# Patient Record
Sex: FEMALE | Race: WHITE | NOT HISPANIC OR LATINO | Employment: OTHER | URBAN - METROPOLITAN AREA
[De-identification: names, ages, dates, MRNs, and addresses within clinical notes are randomized per-mention and may not be internally consistent; named-entity substitution may affect disease eponyms.]

---

## 2018-05-07 ENCOUNTER — LAB VISIT (OUTPATIENT)
Dept: LAB | Facility: HOSPITAL | Age: 68
End: 2018-05-07
Attending: UROLOGY
Payer: MEDICARE

## 2018-05-07 ENCOUNTER — OFFICE VISIT (OUTPATIENT)
Dept: UROLOGY | Facility: CLINIC | Age: 68
End: 2018-05-07
Payer: MEDICARE

## 2018-05-07 VITALS
BODY MASS INDEX: 23.71 KG/M2 | HEIGHT: 64 IN | TEMPERATURE: 98 F | SYSTOLIC BLOOD PRESSURE: 165 MMHG | HEART RATE: 64 BPM | WEIGHT: 138.88 LBS | DIASTOLIC BLOOD PRESSURE: 74 MMHG

## 2018-05-07 DIAGNOSIS — N20.0 NEPHROLITHIASIS: ICD-10-CM

## 2018-05-07 DIAGNOSIS — N13.30 HYDRONEPHROSIS, UNSPECIFIED HYDRONEPHROSIS TYPE: ICD-10-CM

## 2018-05-07 DIAGNOSIS — N13.30 HYDRONEPHROSIS, UNSPECIFIED HYDRONEPHROSIS TYPE: Primary | ICD-10-CM

## 2018-05-07 LAB
ALBUMIN SERPL BCP-MCNC: 3.7 G/DL
ALP SERPL-CCNC: 97 U/L
ALT SERPL W/O P-5'-P-CCNC: 16 U/L
ANION GAP SERPL CALC-SCNC: 11 MMOL/L
AST SERPL-CCNC: 17 U/L
BILIRUB SERPL-MCNC: 1.1 MG/DL
BILIRUB SERPL-MCNC: NORMAL MG/DL
BLOOD URINE, POC: NORMAL
BUN SERPL-MCNC: 13 MG/DL
CALCIUM SERPL-MCNC: 9.3 MG/DL
CHLORIDE SERPL-SCNC: 94 MMOL/L
CO2 SERPL-SCNC: 26 MMOL/L
COLOR, POC UA: NORMAL
CREAT SERPL-MCNC: 0.8 MG/DL
EST. GFR  (AFRICAN AMERICAN): >60 ML/MIN/1.73 M^2
EST. GFR  (NON AFRICAN AMERICAN): >60 ML/MIN/1.73 M^2
GLUCOSE SERPL-MCNC: 113 MG/DL
GLUCOSE UR QL STRIP: NORMAL
KETONES UR QL STRIP: NORMAL
LEUKOCYTE ESTERASE URINE, POC: NORMAL
NITRITE, POC UA: NORMAL
PH, POC UA: 5
POTASSIUM SERPL-SCNC: 3.7 MMOL/L
PROT SERPL-MCNC: 8.1 G/DL
PROTEIN, POC: NORMAL
SODIUM SERPL-SCNC: 131 MMOL/L
SPECIFIC GRAVITY, POC UA: 1015
UROBILINOGEN, POC UA: NORMAL

## 2018-05-07 PROCEDURE — 81002 URINALYSIS NONAUTO W/O SCOPE: CPT | Mod: S$GLB,,, | Performed by: UROLOGY

## 2018-05-07 PROCEDURE — 99999 PR PBB SHADOW E&M-EST. PATIENT-LVL III: CPT | Mod: PBBFAC,,, | Performed by: UROLOGY

## 2018-05-07 PROCEDURE — 99213 OFFICE O/P EST LOW 20 MIN: CPT | Mod: 25,S$GLB,, | Performed by: UROLOGY

## 2018-05-07 PROCEDURE — 36415 COLL VENOUS BLD VENIPUNCTURE: CPT

## 2018-05-07 PROCEDURE — 3078F DIAST BP <80 MM HG: CPT | Mod: CPTII,S$GLB,, | Performed by: UROLOGY

## 2018-05-07 PROCEDURE — 80053 COMPREHEN METABOLIC PANEL: CPT

## 2018-05-07 PROCEDURE — 3077F SYST BP >= 140 MM HG: CPT | Mod: CPTII,S$GLB,, | Performed by: UROLOGY

## 2018-05-07 RX ORDER — ALBUTEROL SULFATE 90 UG/1
2 AEROSOL, METERED RESPIRATORY (INHALATION) EVERY 6 HOURS PRN
COMMUNITY
Start: 2018-03-16

## 2018-05-07 NOTE — PROGRESS NOTES
[facility] Progress Note - [place]  Urology Group: [facility]/Jerald/Debo/Zuleyma  PCP: Dr. Abbey Lafleur [facility]  Cardiologist:     Chief Complaint: left renal stone, left hydro      Subjective:        HPI: Pebbles Boyle is a 67 y.o. female presents with     Last seen 11/3/16    Pt has not been seen since nov 2016. Was supposed to f/u but did not due to other issues. She decided to make f/u bc she has developed edema and was told by her cardiologist this was not due to a cardiac issue. She also has been having intermittent b flank pain. She has had hypoglycemia and hypotension from her new meds.       Nephrolithiasis and possible left UPJo  -ctap w wo 6/8/16 showed the right renal mass (see below) and left renal stone with mild left hydro  -mag3 renal scan 6/29/16 showed 49%fxn right, 51% fxn left, t1/2 8 min on right and 13 minutes on left.   -she had a LMP 8mm stone and underwent ESWL on 7/25/16  -a f/u KUB on 7/27/16 still showed stone  -24 hour urine 10/5/16 only showed a borderline pth  -stone analysis showed 95% ca oxalate stone  -denies increased flank pain with increased fluid intake    Right renal oncocytoma s/p resection in 2016  -in 2016 was found to have a 3cm rup endophytic renal mass abutting collecting system. Ct was done for abdominal us which had been done for elevated lfts. She saw  initially and came to me for 2nd opinion.  -I performed an open right partial nephrectomy on 8/10/16 and pathology returned oncocytoma. She has not had any hematuria or flank pain. Her cr was 0.8 on f/u.         REVIEW OF SYSTEMS:  General ROS: no fevers, no chills  Psychological ROS: no depression  Endocrine ROS: no heat or cold  Respiratory ROS: no SOB  Cardiovascular ROS: +edema  Gastrointestinal ROS: no abdominal pain, no constipation, no diarrhea, no BRBPR  Musculoskeletal ROS: no muscle pain  Neurological ROS: no headaches  Dermatological ROS: no  rashes  HEENT: no glasses, no sinus   ROS: per HPI    The past medical, surgical, social and family hx were reviewed. There have been any changes.       Urologic meds: none  Anticoagulation: No    Objective:     Vitals:    05/07/18 1130   BP: (!) 165/74   Pulse: 64   Temp: 98.3 °F (36.8 °C)       General:WDWN in NAD  Eyes: PERRLA, normal conjunctiva  Respiratory: no increased work on breathing. No wheezing.   Cardiovascular: No obvious extremity edema. Warm and well perfused.   GI: no palpation of masses. No tenderness. No hepatosplenomegaly to palpation.  Musculoskeletal: normal range of motion of bilateral upper extremities. Normal muscle strength and tone.  Skin: no obvious rashes or lesions. No tightening of skin noted.  Neurologic: CN grossly normal. Normal sensation.   Psychiatric: awake, alert and oriented x 3. Mood and affect normal. Cooperative.    Pelvic exam  deferred    Urinalysis: negative  Labs:  Last labs in 2016    Path:   Stone analysis 8/29/16: mostly calcium oxalate    RIGHT KIDNEY, PARTIAL NEPHRECTOMY 8/10/16  -Renal oncocytoma, see note.  -Margin of resection is free of tumor.    Rads:   kub 7/27/16 left renal stone still visible  kub 7/19/16 6mm stone visible on kub   mag3 6/29/16  t 1/2 - right 8 minutes, left 13 minutes  fxn - 49% right, 51% fxn left     ctap w wo contrast 6/8/16 at [facility]   3cm RUP mostly endohytic renal mass abutting the collecting system  1 ra with late splitting of Ra  1 rv  No rp lad     LMP 8mm stone, nonobstructing  L mild hydro with possible left upj    Assessment:       1. Hydronephrosis, unspecified hydronephrosis type    2. Nephrolithiasis        Plan:     Left hydronephrosis and kidney stone  -she has a h/o left mid pole stone s/p ESWL that did not respond in 2016. Was noted to have left hydro and we ordered a mag3 which showed normal drainage but slightly slower on left compared to right. She has been having some edema not explained by cardiologist and also some  intermittent bilateral flank pain L>R. We will start with a renal utlrasound to evaluate for hydronephrosis on the left an x ray to evaluate stone size  -If renal ultrasound shows hydro then she will need a mag3 scan to make sure her kidney is draining appropriately. This can cause pain if kidney doesn't drain well.  -if stone is larger, HU 1400 and failed ESWL then would recommend treatment (would depend on what mag3 renal scan says). Not a good candidate for repeat ESWL bc it did not work.     Oncocytoma, right s/p partial nephrectomy  -no further follow-up needed for this but can see if there's any new masses on the right kidney.     Check LFTs     F/u 4 weeks and may need mag3 renal scan prior to f/u based on ultrasound.

## 2018-05-07 NOTE — PATIENT INSTRUCTIONS
Left hydronephrosis and kidney stone  -she has a h/o left mid pole stone s/p ESWL that did not respond in 2016. Was noted to have left hydro and we ordered a mag3 which showed normal drainage but slightly slower on left compared to right. She has been having some edema not explained by cardiologist and also some intermittent bilateral flank pain L>R. We will start with a renal utlrasound to evaluate for hydronephrosis on the left an x ray to evaluate stone size  -If renal ultrasound shows hydro then she will need a mag3 scan to make sure her kidney is draining appropriately. This can cause pain if kidney doesn't drain well.  -if stone is larger, HU 1400 and failed ESWL then would recommend treatment (would depend on what mag3 renal scan says). Not a good candidate for repeat ESWL bc it did not work.     Oncocytoma, right s/p partial nephrectomy  -no further follow-up needed for this but can see if there's any new masses on the right kidney.     F/u 4 weeks and may need mag3 renal scan prior to f/u based on ultrasound.

## 2018-05-21 ENCOUNTER — HOSPITAL ENCOUNTER (OUTPATIENT)
Dept: RADIOLOGY | Facility: HOSPITAL | Age: 68
Discharge: HOME | End: 2018-05-21
Attending: UROLOGY
Payer: MEDICARE

## 2018-05-21 DIAGNOSIS — N20.0 NEPHROLITHIASIS: ICD-10-CM

## 2018-05-21 DIAGNOSIS — N13.30 HYDRONEPHROSIS, UNSPECIFIED HYDRONEPHROSIS TYPE: ICD-10-CM

## 2018-05-21 PROCEDURE — 74018 RADEX ABDOMEN 1 VIEW: CPT | Mod: 26,,, | Performed by: RADIOLOGY

## 2018-05-21 PROCEDURE — 76770 US EXAM ABDO BACK WALL COMP: CPT | Mod: TC

## 2018-05-21 PROCEDURE — 74018 RADEX ABDOMEN 1 VIEW: CPT | Mod: TC,FY

## 2018-05-21 PROCEDURE — 76770 US EXAM ABDO BACK WALL COMP: CPT | Mod: 26,,, | Performed by: RADIOLOGY

## 2018-05-22 ENCOUNTER — TELEPHONE (OUTPATIENT)
Dept: UROLOGY | Facility: CLINIC | Age: 68
End: 2018-05-22

## 2018-05-22 DIAGNOSIS — N13.5 UPJ OBSTRUCTION, ACQUIRED: Primary | ICD-10-CM

## 2018-05-22 NOTE — TELEPHONE ENCOUNTER
Please let pt know that her ultrasound showed some possible swelling of left kidney vs cysts. When I compared to previous ct looks like it could be swelling. I would like to go ahead and get a mag3 renal scan and have her get this done before she sees me in June to evaluate if that kidney's drainage has worsened since last test in 6/2016. Also we can evaluate and ensure both kidneys are functioning equally       kub 5/21/18  Prior cholecystectomy.  Possible 1 mm left intrarenal stone otherwise negative radiograph.    rbus 5/21/18  No right hydro  Right renal scarring  Mild dilation of LLP vs parapelvic cysts    kub 7/27/16 left renal stone still visible  kub 7/19/16 6mm stone visible on kub   mag3 6/29/16  t 1/2 - right 8 minutes, left 13 minutes  fxn - 49% right, 51% fxn left     ctap w wo contrast 6/8/16 at [facility]   3cm RUP mostly endohytic renal mass abutting the collecting system  1 ra with late splitting of Ra  1 rv  No rp lad     LMP 8mm stone, nonobstructing  L mild hydro with possible left upj

## 2018-05-23 NOTE — TELEPHONE ENCOUNTER
Spoke with patient informed her of results and recommendations. Patient verbally voiced understanding. mag3 scheduled for 5/31

## 2018-05-31 ENCOUNTER — HOSPITAL ENCOUNTER (OUTPATIENT)
Dept: RADIOLOGY | Facility: HOSPITAL | Age: 68
Discharge: HOME | End: 2018-05-31
Attending: UROLOGY
Payer: MEDICARE

## 2018-05-31 DIAGNOSIS — N13.5 UPJ OBSTRUCTION, ACQUIRED: ICD-10-CM

## 2018-05-31 PROCEDURE — 78708 K FLOW/FUNCT IMAGE W/DRUG: CPT | Mod: 26,,, | Performed by: RADIOLOGY

## 2018-05-31 PROCEDURE — A9562 TC99M MERTIATIDE: HCPCS

## 2018-05-31 PROCEDURE — 63600175 PHARM REV CODE 636 W HCPCS

## 2018-05-31 RX ORDER — FUROSEMIDE 10 MG/ML
INJECTION INTRAMUSCULAR; INTRAVENOUS
Status: COMPLETED
Start: 2018-05-31 | End: 2018-05-31

## 2018-05-31 RX ORDER — FUROSEMIDE 10 MG/ML
20 INJECTION INTRAMUSCULAR; INTRAVENOUS ONCE
Status: DISCONTINUED | OUTPATIENT
Start: 2018-05-31 | End: 2018-06-01 | Stop reason: HOSPADM

## 2018-05-31 RX ADMIN — FUROSEMIDE 20 MG: 10 INJECTION, SOLUTION INTRAMUSCULAR; INTRAVENOUS at 01:05

## 2018-06-07 ENCOUNTER — OFFICE VISIT (OUTPATIENT)
Dept: UROLOGY | Facility: CLINIC | Age: 68
End: 2018-06-07
Payer: MEDICARE

## 2018-06-07 VITALS
SYSTOLIC BLOOD PRESSURE: 185 MMHG | BODY MASS INDEX: 23.6 KG/M2 | WEIGHT: 138.25 LBS | HEIGHT: 64 IN | TEMPERATURE: 98 F | HEART RATE: 71 BPM | DIASTOLIC BLOOD PRESSURE: 67 MMHG

## 2018-06-07 DIAGNOSIS — E87.1 HYPONATREMIA: Primary | ICD-10-CM

## 2018-06-07 DIAGNOSIS — N20.0 NEPHROLITHIASIS: ICD-10-CM

## 2018-06-07 DIAGNOSIS — D36.9 ONCOCYTOMA: ICD-10-CM

## 2018-06-07 LAB
BILIRUB SERPL-MCNC: NORMAL MG/DL
BLOOD URINE, POC: NORMAL
COLOR, POC UA: NORMAL
GLUCOSE UR QL STRIP: NORMAL
KETONES UR QL STRIP: NORMAL
LEUKOCYTE ESTERASE URINE, POC: NORMAL
NITRITE, POC UA: NORMAL
PH, POC UA: 5
PROTEIN, POC: NORMAL
SPECIFIC GRAVITY, POC UA: 1015
UROBILINOGEN, POC UA: NORMAL

## 2018-06-07 PROCEDURE — 3077F SYST BP >= 140 MM HG: CPT | Mod: CPTII,S$GLB,, | Performed by: UROLOGY

## 2018-06-07 PROCEDURE — 81002 URINALYSIS NONAUTO W/O SCOPE: CPT | Mod: S$GLB,,, | Performed by: UROLOGY

## 2018-06-07 PROCEDURE — 3078F DIAST BP <80 MM HG: CPT | Mod: CPTII,S$GLB,, | Performed by: UROLOGY

## 2018-06-07 PROCEDURE — 99999 PR PBB SHADOW E&M-EST. PATIENT-LVL IV: CPT | Mod: PBBFAC,,, | Performed by: UROLOGY

## 2018-06-07 PROCEDURE — 99214 OFFICE O/P EST MOD 30 MIN: CPT | Mod: 25,S$GLB,, | Performed by: UROLOGY

## 2018-06-07 NOTE — PATIENT INSTRUCTIONS
Left kidney stone and left lower pole hydro vs parapelvic cysts  -surprisingly left kidney stone appears smaller than it was previously. kub not the best study and could be bigger than kub shows but do not recommend repeating ct at this point. In addition rbus did not identify a stone. Will plan to repeat a kub in 1 month  -she was concerned that her right foot was swelling and this could be caused from kidney dysfunction, especially since she went to cardiologist and they had no further recommendations. I explained her kidneys are functioning and her last kidney function test was normal and that her pain and swelling in her foot should be evaluated by her pcp who can make further recommendations.  -I also let her know that even if she has lower pole hydro or parapelvic cysts, that doing further imaging or testing not necessary with normal mag3 and no pain. She was hesistant to believe me but did try to explain this to her.       Oncocytoma, right s/p partial nephrectomy  -no further follow-up needed for this but can see if there's any new masses on the right kidney.     Hyponatremia  -she's had this since 2016, not sure what it's like before this.   -recommended a referral to nephrology but she is taking care of her  is getting surgery for bladder cancer.    Right foot swelling  -recommend she sees pcp about her foot.     F/u 1 year with kub and rbus

## 2018-06-07 NOTE — PROGRESS NOTES
[facility] Progress Note - [place]  Urology Group: Clayton/Jerald/Debo/Zuleyma  PCP: Dr. Abbey Lafleur [facility] inactive  Cardiologist:     Chief Complaint: left renal stone, left hydro      Subjective:        HPI: Pebbles Boyle is a 67 y.o. female presents with     Last seen 5/21/18    Nephrolithiasis and previous left hydronephrosis   - CTAP w wo 6/8/16 showed the right renal mass (see below) and left renal stone with mild left hydro. I obtained a mag3 to see if she had delayed drainage.   - Mag3 renal scan 6/29/16 showed 49%fxn right, 51% fxn left, t1/2 8 min on right and 13 minutes on left. Denied increased flank pain with increased fluid intake, however she did have a LMP 8mm stone which was visible on kub and underwent ESWL for this on 7/25/16. She states she did not pass many stone fragments but brought what she did pass. analysis showed 95% ca oxalate stone  - a f/u KUB on 7/27/16 still showed stone still visible in same location, same size and denisty.   - 24 hour urine 10/5/16 only showed a borderline pth  - RBUS 5/21/18 showed either lower pole dilation vs parapelvic cysts and no stones. KUB 5/21/18 showed possible 1mm stone remains. Stone no longer as large or as dense as previously.   - MAG3 renal scan 5/31/18 to ensure no worsening left draininge: Uptake - 52.4% on the right and 47.6% on the left, The T1 half on the right is 7 min and on the left is 8 min (improved from 13 minutes in 2016). Pt here to discuss result. Still denies left flank pain.     Right renal oncocytoma s/p resection in 2016  -in 2016 was found to have a 3cm rup endophytic renal mass abutting collecting system. Ct was done for abdominal us which had been done for elevated lfts. She saw  initially and came to me for 2nd opinion.  -I performed an open right partial nephrectomy on 8/10/16 and pathology returned oncocytoma. She has not had any hematuria or flank pain. Her cr was 0.8 on  f/u.   -rbus 5/21/18: No right hydro, Right renal scarring, Mild dilation of LLP vs parapelvic cysts  -she states she is on 3 bp meds and BP still poorly controlled and recent mag3 shows essentially differential function similar.     REVIEW OF SYSTEMS:  General ROS: no fevers, no chills  Psychological ROS: no depression  Endocrine ROS: no heat or cold  Respiratory ROS: no SOB  Cardiovascular ROS: +edema  Gastrointestinal ROS: no abdominal pain, no constipation, no diarrhea, no BRBPR  Musculoskeletal ROS: no muscle pain  Neurological ROS: no headaches  Dermatological ROS: no rashes  HEENT: no glasses, no sinus   ROS: per HPI    The past medical, surgical, social and family hx were reviewed. There have been any changes.       Urologic meds: none  Anticoagulation: No    Objective:     Vitals:    06/07/18 1316   BP: (!) 185/67   Pulse: 71   Temp: 98.4 °F (36.9 °C)       General:WDWN in NAD  Eyes: PERRLA, normal conjunctiva  Respiratory: no increased work on breathing. No wheezing.   Cardiovascular: No obvious extremity edema. Warm and well perfused.   GI: no palpation of masses. No tenderness. No hepatosplenomegaly to palpation.  Musculoskeletal: normal range of motion of bilateral upper extremities. Normal muscle strength and tone.  Skin: no obvious rashes or lesions. No tightening of skin noted.  Neurologic: CN grossly normal. Normal sensation.   Psychiatric: awake, alert and oriented x 3. Mood and affect normal. Cooperative.    Pelvic exam  deferred    Urinalysis: negative  Labs:  Last labs in 2016    Path:   Stone analysis 8/29/16: mostly calcium oxalate    RIGHT KIDNEY, PARTIAL NEPHRECTOMY 8/10/16  -Renal oncocytoma, see note.  -Margin of resection is free of tumor.    Rads:   mag3 renal scan 5/31/18  Uptake - 52.4% on the right and 47.6% on the left  The T1 half on the right is 7 min and on the left is 8 min  Mild increase in total effective renal plasma flow, primarily from the right kidney although both  kidneys demonstrate moderate decreased overall function and totally ERPF is moderately decreased from the expected normal, consistent with intrinsic renal disease    rbus 5/21/18  No right hydro  Right renal scarring  Mild dilation of LLP vs parapelvic cysts    kub 5/21/18  Prior cholecystectomy.  Possible 1 mm left intrarenal stone otherwise negative radiograph.      kub 7/27/16 left renal stone still visible    kub 7/19/16 6mm stone visible on kub    mag3 6/29/16  t 1/2 - right 8 minutes, left 13 minutes  fxn - 49% right, 51% fxn left     ctap w wo contrast 6/8/16 at [facility]   3cm RUP mostly endohytic renal mass abutting the collecting system  1 ra with late splitting of Ra  1 rv  No rp lad     LMP 8mm stone, nonobstructing  L mild hydro with possible left upj    Assessment:       1. Hyponatremia    2. Nephrolithiasis    3. Oncocytoma        Plan:     Left kidney stone and left lower pole hydro vs parapelvic cysts  -surprisingly left kidney stone appears smaller than it was previously. kub not the best study and could be bigger than kub shows but do not recommend repeating ct at this point. In addition rbus did not identify a stone. Will plan to repeat a kub in 1 month  -she was concerned that her right foot was swelling and this could be caused from kidney dysfunction, especially since she went to cardiologist and they had no further recommendations. I explained her kidneys are functioning and her last kidney function test was normal and that her pain and swelling in her foot should be evaluated by her pcp who can make further recommendations.  -I also let her know that even if she has lower pole hydro or parapelvic cysts, that doing further imaging or testing not necessary with normal mag3 and no pain. She was hesistant to believe me but did try to explain this to her.       Oncocytoma, right s/p partial nephrectomy  -no further follow-up needed for this but can see if there's any new masses on the right kidney.      Hyponatremia  -she's had this since 2016, not sure what it's like before this.   -recommended a referral to nephrology but she is taking care of her  is getting surgery for bladder cancer.    Right foot swelling  -recommend she sees pcp about her foot.     F/u 1 year with facundob and matthieu

## 2019-02-04 ENCOUNTER — OFFICE VISIT (OUTPATIENT)
Dept: DERMATOLOGY | Facility: CLINIC | Age: 69
End: 2019-02-04
Payer: MEDICARE

## 2019-02-04 VITALS — HEIGHT: 64 IN | BODY MASS INDEX: 23.56 KG/M2 | WEIGHT: 138 LBS

## 2019-02-04 DIAGNOSIS — L73.8 SEBACEOUS HYPERPLASIA OF FACE: ICD-10-CM

## 2019-02-04 DIAGNOSIS — L81.4 SOLAR LENTIGO: ICD-10-CM

## 2019-02-04 DIAGNOSIS — L82.1 SEBORRHEIC KERATOSES: ICD-10-CM

## 2019-02-04 DIAGNOSIS — D48.9 NEOPLASM OF UNCERTAIN BEHAVIOR: Primary | ICD-10-CM

## 2019-02-04 DIAGNOSIS — L57.0 ACTINIC KERATOSES: ICD-10-CM

## 2019-02-04 PROCEDURE — 17003 DESTRUCTION, PREMALIGNANT LESIONS; SECOND THROUGH 14 LESIONS: ICD-10-PCS | Mod: S$GLB,,, | Performed by: DERMATOLOGY

## 2019-02-04 PROCEDURE — 1101F PT FALLS ASSESS-DOCD LE1/YR: CPT | Mod: CPTII,S$GLB,, | Performed by: DERMATOLOGY

## 2019-02-04 PROCEDURE — 99202 PR OFFICE/OUTPT VISIT, NEW, LEVL II, 15-29 MIN: ICD-10-PCS | Mod: 25,S$GLB,, | Performed by: DERMATOLOGY

## 2019-02-04 PROCEDURE — 17000 DESTRUCT PREMALG LESION: CPT | Mod: 59,S$GLB,, | Performed by: DERMATOLOGY

## 2019-02-04 PROCEDURE — 88305 TISSUE SPECIMEN TO PATHOLOGY, DERMATOLOGY: ICD-10-PCS | Mod: 26,,, | Performed by: PATHOLOGY

## 2019-02-04 PROCEDURE — 17003 DESTRUCT PREMALG LES 2-14: CPT | Mod: S$GLB,,, | Performed by: DERMATOLOGY

## 2019-02-04 PROCEDURE — 11102 PR TANGENTIAL BIOPSY, SKIN, SINGLE LESION: ICD-10-PCS | Mod: S$GLB,,, | Performed by: DERMATOLOGY

## 2019-02-04 PROCEDURE — 17000 PR DESTRUCTION(LASER SURGERY,CRYOSURGERY,CHEMOSURGERY),PREMALIGNANT LESIONS,FIRST LESION: ICD-10-PCS | Mod: 59,S$GLB,, | Performed by: DERMATOLOGY

## 2019-02-04 PROCEDURE — 99202 OFFICE O/P NEW SF 15 MIN: CPT | Mod: 25,S$GLB,, | Performed by: DERMATOLOGY

## 2019-02-04 PROCEDURE — 99999 PR PBB SHADOW E&M-EST. PATIENT-LVL III: CPT | Mod: PBBFAC,,, | Performed by: DERMATOLOGY

## 2019-02-04 PROCEDURE — 1101F PR PT FALLS ASSESS DOC 0-1 FALLS W/OUT INJ PAST YR: ICD-10-PCS | Mod: CPTII,S$GLB,, | Performed by: DERMATOLOGY

## 2019-02-04 PROCEDURE — 11102 TANGNTL BX SKIN SINGLE LES: CPT | Mod: S$GLB,,, | Performed by: DERMATOLOGY

## 2019-02-04 PROCEDURE — 99999 PR PBB SHADOW E&M-EST. PATIENT-LVL III: ICD-10-PCS | Mod: PBBFAC,,, | Performed by: DERMATOLOGY

## 2019-02-04 PROCEDURE — 88305 TISSUE EXAM BY PATHOLOGIST: CPT | Performed by: PATHOLOGY

## 2019-02-04 RX ORDER — HYDROCHLOROTHIAZIDE 25 MG/1
25 TABLET ORAL DAILY
COMMUNITY
End: 2019-02-04

## 2019-02-04 NOTE — PATIENT INSTRUCTIONS
Shave Biopsy Wound Care    Your doctor has performed a shave biopsy today.  A band aid and vaseline ointment has been placed over the site.  This should remain in place for 24 hours.  It is recommended that you keep the area dry for the first 24 hours.  After 24 hours, you may remove the band aid and wash the area with warm soap and water and apply Vaseline jelly.  Many patients prefer to use Neosporin or Bacitracin ointment.  This is acceptable; however, know that you can develop an allergy to this medication even if you have used it safely for years.  It is important to keep the area moist.  Letting it dry out and get air slows healing time, and will worsen the scar.  Band aid is optional after first 24 hours.      If you notice increasing redness, tenderness, pain, or yellow drainage at the biopsy site, please notify your doctor.  These are signs of an infection.    If your biopsy site is bleeding, apply firm pressure for 15 minutes straight.  Repeat for another 15 minutes, if it is still bleeding.   If the surgical site continues to bleed, then please contact your doctor.       [facility]  [facility]  [address]  [place] [address]  Dept: [phone]    CRYOSURGERY      Your doctor has used a method called cryosurgery to treat your skin condition. Cryosurgery refers to the use of very cold substances to treat a variety of skin conditions such as warts, pre-skin cancers, molluscum contagiosum, sun spots, and several benign growths. The substance we use in cryosurgery is liquid nitrogen and is so cold (-195 degrees Celsius) that is burns when administered.     Following treatment in the office, the skin may immediately burn and become red. You may find the area around the lesion is affected as well. It is sometimes necessary to treat not only the lesion, but a small area of the surrounding normal skin to achieve a good response.     A blister, and even a blood filled blister, may  form after treatment.   This is a normal response. If the blister is painful, it is acceptable to sterilize a needle and with rubbing alcohol and gently pop the blister. It is important that you gently wash the area with soap and warm water as the blister fluid may contain wart virus if a wart was treated. Do no remove the roof of the blister.     The area treated can take anywhere from 1-3 weeks to heal. Healing time depends on the kind skin lesion treated, the location, and how aggressively the lesion was treated. It is recommended that the areas treated are covered with Vaseline or bacitracin ointment and a band-aid. If a band-aid is not practical, just ointment applied several times per day will do. Keeping these areas moist will speed the healing time.    Treatment with liquid nitrogen can leave a scar. In dark skin, it may be a light or dark scar, in light skin it may be a white or pink scar. These will generally fade with time, but may never go away completely.     If you have any concerns after your treatment, please feel free to call the office.         [facility]  [facility]  [address] [place] [address]  Dept: [phone]

## 2019-02-14 ENCOUNTER — TELEPHONE (OUTPATIENT)
Dept: DERMATOLOGY | Facility: CLINIC | Age: 69
End: 2019-02-14

## 2019-02-14 ENCOUNTER — PROCEDURE VISIT (OUTPATIENT)
Dept: DERMATOLOGY | Facility: CLINIC | Age: 69
End: 2019-02-14
Payer: MEDICARE

## 2019-02-14 DIAGNOSIS — C44.92 SQUAMOUS CELL CARCINOMA OF SKIN: Primary | ICD-10-CM

## 2019-02-14 PROCEDURE — 99212 PR OFFICE/OUTPT VISIT, EST, LEVL II, 10-19 MIN: ICD-10-PCS | Mod: 24,S$GLB,, | Performed by: DERMATOLOGY

## 2019-02-14 PROCEDURE — 99212 OFFICE O/P EST SF 10 MIN: CPT | Mod: 24,S$GLB,, | Performed by: DERMATOLOGY

## 2019-02-14 NOTE — TELEPHONE ENCOUNTER
Contacted Ms. Boyle in regards to her visit with Dr. Portillo. Per  Dr. Portillo patient needed to be scheduled for same day consult and surgery. Gave her the date of 3/12/2019. She stated taht she had to check with her daughter because she does not drive to [place] to verify if this was a good day. Gave patient my direct contact info to call and confirm appt.

## 2019-02-14 NOTE — PROGRESS NOTES
Subjective:       Patient ID:  Pebbles Boyle is a 68 y.o. female who presents for No chief complaint on file.    Patient here for excision of SCC scalp    FINAL PATHOLOGIC DIAGNOSIS  1. Skin, anterior scalp vertex, shave biopsy:  - INVASIVE SQUAMOUS CELL CARCINOMA.  - THE TUMOR EXTENDS TO THE DEEP AND LATERAL BIOPSY MARGINS.        Review of Systems   Constitutional: Negative for fever and chills.        Objective:    Physical Exam   Skin:   Areas Examined (abnormalities noted in diagram):   Scalp / Hair Palpated and Inspected              Diagram Legend     Erythematous scaling macule/papule c/w actinic keratosis       Vascular papule c/w angioma      Pigmented verrucoid papule/plaque c/w seborrheic keratosis      Yellow umbilicated papule c/w sebaceous hyperplasia      Irregularly shaped tan macule c/w lentigo     1-2 mm smooth white papules consistent with Milia      Movable subcutaneous cyst with punctum c/w epidermal inclusion cyst      Subcutaneous movable cyst c/w pilar cyst      Firm pink to brown papule c/w dermatofibroma      Pedunculated fleshy papule(s) c/w skin tag(s)      Evenly pigmented macule c/w junctional nevus     Mildly variegated pigmented, slightly irregular-bordered macule c/w mildly atypical nevus      Flesh colored to evenly pigmented papule c/w intradermal nevus       Pink pearly papule/plaque c/w basal cell carcinoma      Erythematous hyperkeratotic cursted plaque c/w SCC      Surgical scar with no sign of skin cancer recurrence      Open and closed comedones      Inflammatory papules and pustules      Verrucoid papule consistent consistent with wart     Erythematous eczematous patches and plaques     Dystrophic onycholytic nail with subungual debris c/w onychomycosis     Umbilicated papule    Erythematous-base heme-crusted tan verrucoid plaque consistent with inflamed seborrheic keratosis     Erythematous Silvery Scaling Plaque c/w Psoriasis     See annotation    Assessment /  Plan:        Squamous cell carcinoma of skin  -     Ambulatory referral to Dermatology    discussed E&S in office vs Mohs  Poorly circumscribed lesion, advised to consider Mohs, patient agreeable.  Referral to Dr Small         Follow-up in about 6 months (around 8/14/2019).

## 2019-02-14 NOTE — TELEPHONE ENCOUNTER
----- Message from Juana Amaro sent at 2/14/2019  4:17 PM CST -----  Type: Needs Medical Advice    Who Called:  Patient    Best Call Back Number: [phone] (home)     Additional Information: Patient states she has a referral to a Derm surgeon for Squamous cell carcinoma of skin, would like to schedule in [place], referral to a physician in [place] [facility] which would be too far. Please contact to advise

## 2019-02-14 NOTE — TELEPHONE ENCOUNTER
----- Message from Roseann Portillo MD sent at 2/14/2019  1:31 PM CST -----  I would like to refer this lady to Dr Small for Mohs of scalp SCC (pic in chart). Photo consult appreciated if possible ([place] patient)  Thank you!

## 2019-02-14 NOTE — Clinical Note
I would like to refer this lady to Dr Small for Mohs of scalp SCC (pic in chart). Photo consult appreciated if possible ([place] patient)Thank you!

## 2019-02-14 NOTE — TELEPHONE ENCOUNTER
Returned patient call, told her that I needed Dr. Portillo to send the referral to me before I could make her an appointment. Jaylin

## 2019-02-18 ENCOUNTER — TELEPHONE (OUTPATIENT)
Dept: DERMATOLOGY | Facility: CLINIC | Age: 69
End: 2019-02-18

## 2019-02-18 NOTE — TELEPHONE ENCOUNTER
----- Message from Nadeen Vu MA sent at 2/18/2019  4:03 PM CST -----  Contact: pt      ----- Message -----  From: Lila Burton  Sent: 2/18/2019   2:56 PM  To: Staci Espinoza S Staff    Pt would like to be called back regarding getting a appt     Pt can be reached at [phone]

## 2019-02-18 NOTE — TELEPHONE ENCOUNTER
----- Message from Steph Ignacio sent at 2/18/2019 11:17 AM CST -----  Type: Needs Medical Advice    Who Called:  Patient  Best Call Back Number: [phone]  Additional Information: Office needs to send a referral to Dr. Nnamdi Pak so that she may get an appointment as soon as possible. She did not have the fax number. Please call to advise of status when completed.

## 2019-02-19 NOTE — TELEPHONE ENCOUNTER
Returned pt's call. No answer. Left message on voicemail confirming consult with Dr Pak has been scheduled already.

## 2019-03-04 ENCOUNTER — INITIAL CONSULT (OUTPATIENT)
Dept: DERMATOLOGY | Facility: CLINIC | Age: 69
End: 2019-03-04
Payer: MEDICARE

## 2019-03-04 VITALS
BODY MASS INDEX: 23.39 KG/M2 | SYSTOLIC BLOOD PRESSURE: 140 MMHG | DIASTOLIC BLOOD PRESSURE: 62 MMHG | HEART RATE: 65 BPM | WEIGHT: 137 LBS | HEIGHT: 64 IN

## 2019-03-04 DIAGNOSIS — C44.42 SQUAMOUS CELL CARCINOMA OF SCALP: Primary | ICD-10-CM

## 2019-03-04 PROCEDURE — 99214 OFFICE O/P EST MOD 30 MIN: CPT | Mod: S$GLB,,, | Performed by: DERMATOLOGY

## 2019-03-04 PROCEDURE — 99999 PR PBB SHADOW E&M-EST. PATIENT-LVL III: CPT | Mod: PBBFAC,,, | Performed by: DERMATOLOGY

## 2019-03-04 PROCEDURE — 99214 PR OFFICE/OUTPT VISIT, EST, LEVL IV, 30-39 MIN: ICD-10-PCS | Mod: S$GLB,,, | Performed by: DERMATOLOGY

## 2019-03-04 PROCEDURE — 99999 PR PBB SHADOW E&M-EST. PATIENT-LVL III: ICD-10-PCS | Mod: PBBFAC,,, | Performed by: DERMATOLOGY

## 2019-03-04 RX ORDER — CLONAZEPAM 0.5 MG/1
0.5 TABLET ORAL 2 TIMES DAILY PRN
COMMUNITY
End: 2024-03-22

## 2019-03-04 RX ORDER — METHOCARBAMOL 500 MG/1
500 TABLET, FILM COATED ORAL 4 TIMES DAILY
COMMUNITY
End: 2022-08-26

## 2019-03-04 NOTE — LETTER
March 5, 2019      Roseann Portillo MD  [address] [place] [address] [address]  [place] [address]           [place] [facility] [address] [facility] [address] [place] [address]  Phone: [phone]          Patient: Pebbles Boyle   MR Number: [id number]   YOB: 1950   Date of Visit: 3/4/2019       Dear Dr. Roseann Portillo:    Thank you for referring Pebbles Boyle to me for evaluation. Attached you will find relevant portions of my assessment and plan of care.    If you have questions, please do not hesitate to call me. I look forward to following Pebbles Boyle along with you.    Sincerely,    Nnamdi Pak MD    Enclosure  CC:  No Recipients    If you would like to receive this communication electronically, please contact [email or web address] or [phone] to request more information on [employer] access.    For providers and/or their staff who would like to refer a patient to [facility], please contact us through our one-stop-shop provider referral line, [facility] , at [phone].    If you feel you have received this communication in error or would no longer like to receive these types of communications, please e-mail [email or web address]

## 2019-04-08 ENCOUNTER — TELEPHONE (OUTPATIENT)
Dept: DERMATOLOGY | Facility: CLINIC | Age: 69
End: 2019-04-08

## 2019-04-08 NOTE — TELEPHONE ENCOUNTER
----- Message from Yessenia Zimmerman sent at 4/8/2019  1:15 PM CDT -----  Contact: patient  Type: Needs Medical Advice    Who Called:    Symptoms (please be specific):  na  How long has patient had these symptoms:  marcus  Pharmacy name and phone #:  marcus  Best Call Back Number: [phone] (home)     Additional Information: Patient has questions about procedure on 4/30.Please call to advise. Thanks!

## 2019-04-29 NOTE — PROGRESS NOTES
Prior photo(s) of site(s) to confirm location(s):          Allergies:   Review of patient's allergies indicates:   Allergen Reactions    Tramadol Nausea And Vomiting    Codeine Other (See Comments)     Chest pains    Hydrocodone Other (See Comments)     hypotension       Chief Complaint: here for Mohs' surgery to a squamous cell carcinoma on the scalp vertex    HPI:   Location: scalp vertex  Quality: unsure of location now; no residual lesion noted  Timing: I last saw her about 7 weeks ago; biopsy was 3 months ago  Context: prior biopsy showed invasive squamous cell carcinoma; see previous notes      Review of Systems:  Skin: she has multiple other rough spots on her scalp    Medications: see list    Current Outpatient Medications:     albuterol (PROAIR HFA) 90 mcg/actuation inhaler, inhale 2 puff by inhalation route  every 4 - 6 hours as needed as needed, Disp: , Rfl:     ANTIOX #8/OM3/DHA/EPA/LUT/ZEAX (PRESERVISION AREDS 2, OMEGA-3, ORAL), Take 1 capsule by mouth 2 (two) times daily. , Disp: , Rfl:     clonazePAM (KLONOPIN) 0.5 MG tablet, Take 0.5 mg by mouth 2 (two) times daily as needed for Anxiety., Disp: , Rfl:     cyclobenzaprine (FLEXERIL) 10 MG tablet, Take 10 mg by mouth 3 (three) times daily as needed for Muscle spasms., Disp: , Rfl:     glipiZIDE (GLUCOTROL) 5 MG tablet, Take 2.5 mg by mouth 2 (two) times daily before meals. , Disp: , Rfl:     GUAIFENESIN (MUCINEX ORAL), Take by mouth., Disp: , Rfl:     lisinopril-hydrochlorothiazide (PRINZIDE,ZESTORETIC) 20-25 mg Tab, Take 1 tablet by mouth every morning., Disp: , Rfl:     methocarbamol (ROBAXIN) 500 MG Tab, Take 500 mg by mouth 4 (four) times daily., Disp: , Rfl:     metoprolol tartrate (LOPRESSOR) 100 MG tablet, Take 100 mg by mouth 2 (two) times daily., Disp: , Rfl:     terazosin (HYTRIN) 1 MG capsule, Take 1 mg by mouth every evening., Disp: , Rfl:     vitamins  A,C,E-zinc-copper (PRESERVISION AREDS) 14,320-226-200 unit-mg-unit Cap,  Take by mouth., Disp: , Rfl:     cloNIDine (CATAPRES) 0.1 MG tablet, Take 1 tablet (0.1 mg total) by mouth 2 (two) times daily. Hold if heart rate < 60/min and SBP < 100 mmHg., Disp: 60 tablet, Rfl: 0    fluorouracil 5% 5 % Soln, Apply twice each day to scalp as directed, Disp: 20 mL, Rfl: 1    Review of patient's allergies indicates:   Allergen Reactions    Tramadol Nausea And Vomiting    Codeine Other (See Comments)     Chest pains    Hydrocodone Other (See Comments)     hypotension       Past Medical History:   Diagnosis Date    Diabetes mellitus     Full dentures     Hypertension     Macular degeneration     PVC (premature ventricular contraction)        Past Surgical History:   Procedure Laterality Date    Bilateral Tubal ligation      CARDIAC CATHETERIZATION  2014    Dr. womack   -    negative    EYE SURGERY  1987    [facility]    GALLBLADDER SURGERY      LITHOTRIPSY      LITHOTRIPSY-EXTRACORPOREAL SHOCK WAVE Left 7/20/2016    [facility]    NEPHRECTOMY-PARTIAL Right 8/10/2016    [facility]    TONSILLECTOMY         Social History     Socioeconomic History    Marital status:      Spouse name: Not on file    Number of children: Not on file    Years of education: Not on file    Highest education level: Not on file   Occupational History    Not on file   Social Needs    Financial resource strain: Not on file    Food insecurity:     Worry: Not on file     Inability: Not on file    Transportation needs:     Medical: Not on file     Non-medical: Not on file   Tobacco Use    Smoking status: Current Every Day Smoker     Packs/day: 0.50     Years: 50.00     Pack years: 25.00    Smokeless tobacco: Never Used   Substance and Sexual Activity    Alcohol use: Yes     Comment: rarely    Drug use: No    Sexual activity: Not on file   Lifestyle    Physical activity:     Days per week: Not on file     Minutes per  session: Not on file    Stress: Not on file   Relationships    Social connections:     Talks on phone: Not on file     Gets together: Not on file     Attends [employer] service: Not on file     Active member of club or organization: Not on file     Attends meetings of clubs or organizations: Not on file     Relationship status: Not on file   Other Topics Concern    Are you pregnant or think you may be? Not Asked    Breast-feeding Not Asked   Social History Narrative    Not on file       Vitals:    04/30/19 1126   BP: (!) 171/64   Pulse: 78       EXAM:  Constitutional  - General appearance: well-developed, well-nourished, well-kempt older white female    Eyes  - Conjunctivae and lids - no abnormalities; sclerae anicteric  Neurologic/Psychiatric  - Orientation - Alert,  normal orientation to time, place, person  - Mood and affect - Normal mood and affect with no evidence of depression, anxiety, agitation; speech is normal  Skin:   - Scalp: there are multiple rough, hyperkeratotic, irregularly-shaped, irregularly-surfaced, yellowish papules and/or plaques with mild surrounding erythema but no notable underlying induration, which are clinically typical in appearance for actinic keratoses, scattered across her scalp vertex; there is now no evidence of residual squamous cell carcinoma to the area, and I am unable to confirm with certainty the site of the previous biopsy, despite reference to the photographs taken at previous visits     Assessment:  squamous cell carcinoma of the scalp, now clinically clear 3 months post biopsy; site cannot be confirmed with certainty  Actinic keratoses      PLAN:  The diagnosis of the squamous cell carcinoma and further options at this point were reviewed. Given the inability to confirm with certainty the site of the previous biopsy, and the absence of evidence of residual squamous cell carcinoma to the area, it seems most reasonable to defer surgery at this time.    Given the number  of other lesions on her scalp consistent with actinic keratoses, it also seems reasonable to have her start treatment with Efudex solution topically to the area to clear these background lesions and minimize the risk of recurrence of the squamous cell carcinoma.    The use of Efudex and the anticipated effects were discussed with the patient. Prescription will be sent to her pharmacy.  She is to follow up 2 weeks after starting the medication.    Sufficient time was available for questions, and all questions were answered to her satisfaction.   --------------------------------------  Note: Some or all of this note may have been generated using voice recognition software. There may be voice recognition errors including grammatical and/or spelling errors found in the text. Attempts were made to correct these errors prior to signature.

## 2019-04-30 ENCOUNTER — PROCEDURE VISIT (OUTPATIENT)
Dept: DERMATOLOGY | Facility: CLINIC | Age: 69
End: 2019-04-30
Payer: MEDICARE

## 2019-04-30 VITALS
DIASTOLIC BLOOD PRESSURE: 64 MMHG | SYSTOLIC BLOOD PRESSURE: 171 MMHG | HEART RATE: 78 BPM | WEIGHT: 137 LBS | BODY MASS INDEX: 23.39 KG/M2 | HEIGHT: 64 IN

## 2019-04-30 DIAGNOSIS — L57.0 ACTINIC KERATOSIS: Primary | ICD-10-CM

## 2019-04-30 PROCEDURE — 99212 PR OFFICE/OUTPT VISIT, EST, LEVL II, 10-19 MIN: ICD-10-PCS | Mod: S$GLB,,, | Performed by: DERMATOLOGY

## 2019-04-30 PROCEDURE — 99212 OFFICE O/P EST SF 10 MIN: CPT | Mod: S$GLB,,, | Performed by: DERMATOLOGY

## 2019-04-30 RX ORDER — FLUOROURACIL 50 MG/ML
SOLUTION TOPICAL
Qty: 20 ML | Refills: 1 | Status: SHIPPED | OUTPATIENT
Start: 2019-04-30 | End: 2019-09-05 | Stop reason: SDUPTHER

## 2019-05-13 ENCOUNTER — TELEPHONE (OUTPATIENT)
Dept: DERMATOLOGY | Facility: CLINIC | Age: 69
End: 2019-05-13

## 2019-05-13 NOTE — TELEPHONE ENCOUNTER
----- Message from Miriam Saul sent at 5/13/2019  9:43 AM CDT -----  Contact: Patient  Type: Needs Medical Advice    Who Called:  Patient  Best Call Back Number:   Additional Information: Calling to cancel her post op on 5/14/19. She will call back to reschedule. Call to pod. No answer. Please advise.

## 2019-05-13 NOTE — TELEPHONE ENCOUNTER
Returned patient call, I canceled her follow up visit because she did not get the efudex yet, will call us when she gets it.

## 2019-08-06 ENCOUNTER — TELEPHONE (OUTPATIENT)
Dept: DERMATOLOGY | Facility: CLINIC | Age: 69
End: 2019-08-06

## 2019-08-06 NOTE — TELEPHONE ENCOUNTER
Returned patient call and I made her an appointment for 9-5-19, I asked her if she started the efudex she said no because she could not afford it.

## 2019-09-05 ENCOUNTER — OFFICE VISIT (OUTPATIENT)
Dept: DERMATOLOGY | Facility: CLINIC | Age: 69
End: 2019-09-05
Payer: MEDICARE

## 2019-09-05 DIAGNOSIS — L57.0 ACTINIC KERATOSIS: Primary | ICD-10-CM

## 2019-09-05 DIAGNOSIS — Z85.828 HISTORY OF NONMELANOMA SKIN CANCER: ICD-10-CM

## 2019-09-05 PROCEDURE — 99212 OFFICE O/P EST SF 10 MIN: CPT | Mod: S$PBB,,, | Performed by: DERMATOLOGY

## 2019-09-05 PROCEDURE — 99212 PR OFFICE/OUTPT VISIT, EST, LEVL II, 10-19 MIN: ICD-10-PCS | Mod: S$PBB,,, | Performed by: DERMATOLOGY

## 2019-09-05 PROCEDURE — 99212 OFFICE O/P EST SF 10 MIN: CPT | Mod: PBBFAC,PO | Performed by: DERMATOLOGY

## 2019-09-05 PROCEDURE — 99999 PR PBB SHADOW E&M-EST. PATIENT-LVL II: CPT | Mod: PBBFAC,,, | Performed by: DERMATOLOGY

## 2019-09-05 PROCEDURE — 99999 PR PBB SHADOW E&M-EST. PATIENT-LVL II: ICD-10-PCS | Mod: PBBFAC,,, | Performed by: DERMATOLOGY

## 2019-09-05 RX ORDER — FLUOROURACIL 50 MG/ML
SOLUTION TOPICAL
Qty: 20 ML | Refills: 1 | Status: SHIPPED | OUTPATIENT
Start: 2019-09-05 | End: 2019-12-02 | Stop reason: SDUPTHER

## 2019-09-05 NOTE — PROGRESS NOTES
CHIEF COMPLAINT: followup squamous cell carcinoma     The patient is accompanied to this visit by her daughter.    HISTORY OF PRESENT ILLNESS:  Location(s): scalp  Timing: I last saw her 4 months ago  Biopsy was in January   Context: see previous notes  She came for Mohs' surgery to the site, at which time there was no evidence of residual squamous cell carcinoma and the site of previous biopsy could not be confirmed with certainty  She had multiple other lesions on her scalp consistent with actinic keratoses, and we planned to have her use Efudex to the area  However, she was unable to afford the prescription  She now returns for re-evaluation and to discuss further options  Since her last visit, her insurance has changed and she may be able to afford the prescription for Efudex    EXAMINATION:  Skin: there are still multiple rough, hyperkeratotic, irregularly-shaped, irregularly-surfaced, yellowish papules and/or plaques with mild surrounding erythema but no notable underlying induration, which are clinically typical in appearance for actinic keratoses, scattered across the scalp vertex     ASSESSMENT:   Actinic keratosis  Status post biopsy, squamous cell carcinoma, scalp, clinically clear 8 months post biopsy    PLAN:  I discussed again with her the diagnosis and management options and risks and benefits of the alternatives.  I still feel that it would be appropriate to have her initiate treatment with Efudex to the area.  I reviewed the use of the medication and anticipated effects.  We also discussed timing of treatment, and we discussed having her start the medication once the weather cools down.  Under the circumstances, I will contact Dr. Portillo to have her follow-up on this, to save Ms. Boyle the need to come back to [place] to see me.    --------------------------------------  Note: Some or all of this note may have been generated using voice recognition software. There may be voice recognition errors  including grammatical and/or spelling errors found in the text. Attempts were made to correct these errors prior to signature.

## 2019-09-23 ENCOUNTER — TELEPHONE (OUTPATIENT)
Dept: DERMATOLOGY | Facility: CLINIC | Age: 69
End: 2019-09-23

## 2019-09-23 NOTE — TELEPHONE ENCOUNTER
Called pt to schedule f/u appt in Dec. Pt states she didn't have her calendar with her and would call back to schedule.

## 2019-09-23 NOTE — TELEPHONE ENCOUNTER
----- Message from Roseann Portillo MD sent at 9/20/2019  4:04 PM CDT -----  We sure will schedule FU with us in December to start efudex to scalp and/or assess progress  Thank you Nnamdi LANDAVERDE    ----- Message -----  From: Nnamdi Pak MD  Sent: 9/7/2019   2:47 PM  To: MD Roseann Nathan,   Attached is the note from my visit with Ms. Boyle. As you may remember, I had seen her previously regarding treatment of a biopsy-proven squamous cell carcinoma on the scalp vertex.  She was scheduled for Mohs' surgery to the site in April, but when she came for her surgery I was unable to confirm with certainty the site of the previous biopsy, and treatment was deferred.  However, there were multiple lesions on her scalp consistent with actinic keratoses, and I had planned to have her use Efudex solution to the area.  She was unable to afford the medication at that point.  She return for follow-up last week.  There remains no evidence of the previously-biopsied squamous cell carcinoma. She still has multiple actinic keratoses on her scalp.  In addition, her insurance has now changed, and she thinks she will be able to afford the Efudex.  I discussed with her and her daughter the use of the medication and anticipated effects.  I also discussed with them timing of treatment, and possible delay of starting treatment until the weather cools down.  I discussed with her the option of following up with you to pursue treatment, so as to save her the need for driving to [place].  At this point, my plan would be to have her schedule a follow-up with you in December to coordinate having her start use of Efudex solution to her scalp.  I recommended that, if she needs another prescription at that point, either you or I could send it to her pharmacy.  I told her that I would anticipate a total duration of treatment of 4-6 weeks.   Good you have year staff contact her to schedule an appointment for her with you in  December?  Please let me know if you have any questions.  Many thanks,   Nnamdi

## 2019-10-25 NOTE — PROGRESS NOTES
ALLERGIES:  Tramadol; Codeine; and Hydrocodone    CHIEF COMPLAINT:  This 68 y.o. female comes for evaluation for Mohs' Micrographic Surgery, Fresh Tissue Technique, for treatment of a biopsy-proven squamous cell carcinoma on the anterior scalp vertex. Consultation requested by Roseann Portillo M.D..    HISTORY OF PRESENT ILLNESS:   Location: anterior scalp vertex  Duration: 6 months or more  Quality: persistent  Context: status post biopsy by Roseann Portillo M.D.; path = squamous cell carcinoma; pathology accession #[id number],  Pathology [facility]    Prior Treatment: none    See also the handwritten notes/diagrams scanned to chart for additional details.    Defibrillator: No  Pacemaker: No  Artificial heart valves: No  Artificial joints: No    REVIEW OF SYSTEMS:   General: general health good  Skin: has no previous history of skin cancer(s)  CV: has hypertension, has PVC's; no artificial valves, has no chest pain  Resp: has shortness of breath  Endo: has diabetes  Hem/Lymph: not taking prescribed anticoagulants-, has easy bruising/bleeding  Allergy/Immuno: has allergies as noted above  GI: has no history of hepatitis  MS: as noted above     PAST MEDICAL HISTORY:  Past Medical History:   Diagnosis Date    Diabetes mellitus     Full dentures     Hypertension     Macular degeneration     PVC (premature ventricular contraction)        PAST SURGICAL HISTORY:  Past Surgical History:   Procedure Laterality Date    Bilateral Tubal ligation      CARDIAC CATHETERIZATION  2014    Dr. womack   -    negative    EYE SURGERY  1987    [facility]    GALLBLADDER SURGERY      LITHOTRIPSY      LITHOTRIPSY-EXTRACORPOREAL SHOCK WAVE Left 7/20/2016    [facility]    NEPHRECTOMY-PARTIAL Right 8/10/2016    [facility]    TONSILLECTOMY          SOCIAL HISTORY:  Dependencies: smoking status as noted below  Social History     Tobacco Use    Smoking status:  Current Every Day Smoker     Packs/day: 0.50     Years: 50.00     Pack years: 25.00    Smokeless tobacco: Never Used   Substance Use Topics    Alcohol use: Yes     Comment: rarely    Drug use: No       PERTINENT MEDICATIONS:  See medications list.    Current Outpatient Medications:     albuterol (PROAIR HFA) 90 mcg/actuation inhaler, inhale 2 puff by inhalation route  every 4 - 6 hours as needed as needed, Disp: , Rfl:     ANTIOX #8/OM3/DHA/EPA/LUT/ZEAX (PRESERVISION AREDS 2, OMEGA-3, ORAL), Take 1 capsule by mouth 2 (two) times daily. , Disp: , Rfl:     clonazePAM (KLONOPIN) 0.5 MG tablet, Take 0.5 mg by mouth 2 (two) times daily as needed for Anxiety., Disp: , Rfl:     cloNIDine (CATAPRES) 0.1 MG tablet, Take 1 tablet (0.1 mg total) by mouth 2 (two) times daily. Hold if heart rate < 60/min and SBP < 100 mmHg., Disp: 60 tablet, Rfl: 0    cyclobenzaprine (FLEXERIL) 10 MG tablet, Take 10 mg by mouth 3 (three) times daily as needed for Muscle spasms., Disp: , Rfl:     glipiZIDE (GLUCOTROL) 5 MG tablet, Take 2.5 mg by mouth 2 (two) times daily before meals. , Disp: , Rfl:     GUAIFENESIN (MUCINEX ORAL), Take by mouth., Disp: , Rfl:     lisinopril-hydrochlorothiazide (PRINZIDE,ZESTORETIC) 20-25 mg Tab, Take 1 tablet by mouth every morning., Disp: , Rfl:     methocarbamol (ROBAXIN) 500 MG Tab, Take 500 mg by mouth 4 (four) times daily., Disp: , Rfl:     metoprolol tartrate (LOPRESSOR) 100 MG tablet, Take 100 mg by mouth 2 (two) times daily., Disp: , Rfl:     terazosin (HYTRIN) 1 MG capsule, Take 1 mg by mouth every evening., Disp: , Rfl:     vitamins  A,C,E-zinc-copper (PRESERVISION AREDS) 14,320-226-200 unit-mg-unit Cap, Take by mouth., Disp: , Rfl:     ALLERGIES:  Tramadol; Codeine; and Hydrocodone    EXAM:  See also the handwritten notes/diagrams scanned to chart for additional details.  Constitutional  General appearance: well-developed, well-nourished, well-kempt older white female    Eyes  Inspection  of conjunctivae and lids reveals no abnormalities; sclerae anicteric  Neurologic/Psychiatric  Alert,  normal orientation to time, place, person  Normal mood and affect with no evidence of depression, anxiety, agitation  Skin: see photo(s)  Head: background moderate solar damage to exposed areas of skin; in addition, inspection/palpation reveals an approximately 8 mm pink depression with some overlying approximately 1 cm hairs arising from the depression, consistent with a recent biopsy site,  on the anterior scalp, which feels freely movable over the underlying tissues on palpation; site(s) confirmed by reference to the photograph(s) in the chart taken at the time of the biopsy/biopsies by the referring physician and she confirmed this as the site of the prior biopsy  Neck: examination reveals moderate chronic solar damage  Right upper extremity: examination reveals moderate chronic solar damage  Left upper extremity: examination reveals moderate chronic solar damage              ASSESSMENT: biopsy-proven squamous cell carcinoma of the anterior scalp  chronic solar damage to areas as noted above    PLAN:  The diagnosis and management options, and risks and benefits of the alternatives, including observation/non-treatment, radiation treatment, excision with vertical frozen section or paraffin-embedded section margin evaluation, and Mohs' Micrographic Surgery, Fresh Tissue Technique, were discussed at length with the patient. In particular, the discussion included, but was not limited to, the following:    One alternative at this point would be to defer further treatment and observe the lesion. With small skin cancers of this kind, it is possible that a biopsy can be sufficient to definitively treat a small skin cancer of this kind. Alternatively, some skin cancers are slow growing and do not require immediate treatment. The potential advantage of this choice would be to avoid the need for possibly unnecessary  additional surgery. Among the potential disadvantages of this would be the possibility of enlargement of the lesion, more extensive spread of the lesion or recurrence at a later date, which might necessitate a larger and more complex surgery.    Radiation treatment can be an effective treatment for this type of skin cancer. The usual course of treatment is every weekday for several weeks. Local irritation will result from treatment, although no systemic side effects are expected. The potential advantage of radiation treatment is that it avoids the need for surgery. Among the disadvantages of radiation treatment are the length of treatment, the local inflammatory response, the absence of pathologic confirmation of the removal of the skin cancer, a possible increased risk of additional skin cancer in the treated area in later years, and a somewhat increased risk of recurrence at a later date.     Excisional surgery can be an effective treatment for this type of skin cancer. This would involve excision of the lesion with margin evaluation by submitting the specimen to a pathologist for either immediate marginal assessment via frozen section processing, or delayed marginal assessment by fixed-tissue processing. The potential advantage of this technique is that it offers a way of treating the lesion with some degree of histologic confirmation of tumor removal. Among the disadvantages of this treatment are the possible need for re-excision if marginal involvement is identified, a somewhat greater likelihood of recurrence as compared to Mohs' surgery because of the less comprehensive margin evaluation inherent in the technique, and the general potential risks of surgery, including allergic reactions to the anesthetic and other materials used, infection, injury to nerves in the area with consequent loss of sensation or muscle function, and scarring or distortion of surrounding structures.    Mohs' surgery is a very  effective treatment for this type of skin cancer. The potential advantage of Mohs' surgery is that this technique offers the greatest possible certainty of knowing that the skin cancer has been completely removed, with the removal of the least amount of normal tissue. The potential disadvantages of Mohs' surgery include the duration of the surgery, the possible need for a separate surgery for reconstruction following tumor removal, and scarring as a result. In addition, general potential risks of surgery as noted above also apply to treatment via Mohs' surgery.    In light of the nature of this tumor and the location on the scalp in an area of increased risk of recurrence,  Mohs' micrographic surgery was thought to be the most appropriate management choice, and this diagnosis is appropriate for treatment by Mohs' micrographic surgery.     Sufficient time was available for questions, and all questions were answered to her satisfaction. She fully understands the aims, risks, alternatives, and possible complications, and has elected to proceed with the surgery, and verbally consented to do so. The procedure will be scheduled in the near future.    Routine pre-op instructions were given to her.    --------------------------------------  Note: Some or all of this note may have been generated using voice recognition software. There may be voice recognition errors including grammatical and/or spelling errors found in the text. Attempts were made to correct these errors prior to signature.        Ear Star Wedge Flap Text: The defect edges were debeveled with a #15 blade scalpel.  Given the location of the defect and the proximity to free margins (helical rim) an ear star wedge flap was deemed most appropriate.  Using a sterile surgical marker, the appropriate flap was drawn incorporating the defect and placing the expected incisions between the helical rim and antihelix where possible.  The area thus outlined was incised through and through with a #15 scalpel blade.

## 2019-11-11 ENCOUNTER — TELEPHONE (OUTPATIENT)
Dept: DERMATOLOGY | Facility: CLINIC | Age: 69
End: 2019-11-11

## 2019-11-11 NOTE — TELEPHONE ENCOUNTER
----- Message from Nasrin Borrero sent at 11/11/2019  9:35 AM CST -----  Type: Needs Medical Advice    Who Called:  Patient - Pebbles   Symptoms (please be specific):  pt has another horn that needs to be removed     Best Call Back Number:  [phone]   Additional Information: pt had a procedure for Excision SCC, anterior scalp vertex on 2/14/2019 had horn removed needed another removed please contact for scheduling

## 2019-11-11 NOTE — TELEPHONE ENCOUNTER
Spoke with patient, appointment booked Thursday, November 14 at 130 PM.  Advised of our new location.

## 2019-11-14 ENCOUNTER — OFFICE VISIT (OUTPATIENT)
Dept: DERMATOLOGY | Facility: CLINIC | Age: 69
End: 2019-11-14
Payer: MEDICARE

## 2019-11-14 VITALS — HEIGHT: 64 IN | BODY MASS INDEX: 23.39 KG/M2 | WEIGHT: 137 LBS

## 2019-11-14 DIAGNOSIS — L57.0 ACTINIC KERATOSES: ICD-10-CM

## 2019-11-14 DIAGNOSIS — Z85.828 HISTORY OF NONMELANOMA SKIN CANCER: ICD-10-CM

## 2019-11-14 DIAGNOSIS — D48.5 NEOPLASM OF UNCERTAIN BEHAVIOR OF SKIN: Primary | ICD-10-CM

## 2019-11-14 PROCEDURE — 88342 CHG IMMUNOCYTOCHEMISTRY: ICD-10-PCS | Mod: 26,,, | Performed by: PATHOLOGY

## 2019-11-14 PROCEDURE — 88305 TISSUE EXAM BY PATHOLOGIST: CPT | Performed by: PATHOLOGY

## 2019-11-14 PROCEDURE — 11102 TANGNTL BX SKIN SINGLE LES: CPT | Mod: S$PBB,,, | Performed by: DERMATOLOGY

## 2019-11-14 PROCEDURE — 17003 DESTRUCTION, PREMALIGNANT LESIONS; SECOND THROUGH 14 LESIONS: ICD-10-PCS | Mod: S$PBB,,, | Performed by: DERMATOLOGY

## 2019-11-14 PROCEDURE — 88342 IMHCHEM/IMCYTCHM 1ST ANTB: CPT | Mod: 26,,, | Performed by: PATHOLOGY

## 2019-11-14 PROCEDURE — 99212 OFFICE O/P EST SF 10 MIN: CPT | Mod: 25,S$PBB,, | Performed by: DERMATOLOGY

## 2019-11-14 PROCEDURE — 17003 DESTRUCT PREMALG LES 2-14: CPT | Mod: S$PBB,,, | Performed by: DERMATOLOGY

## 2019-11-14 PROCEDURE — 99999 PR PBB SHADOW E&M-EST. PATIENT-LVL III: ICD-10-PCS | Mod: PBBFAC,,, | Performed by: DERMATOLOGY

## 2019-11-14 PROCEDURE — 99212 PR OFFICE/OUTPT VISIT, EST, LEVL II, 10-19 MIN: ICD-10-PCS | Mod: 25,S$PBB,, | Performed by: DERMATOLOGY

## 2019-11-14 PROCEDURE — 11102 PR TANGENTIAL BIOPSY, SKIN, SINGLE LESION: ICD-10-PCS | Mod: S$PBB,,, | Performed by: DERMATOLOGY

## 2019-11-14 PROCEDURE — 11102 TANGNTL BX SKIN SINGLE LES: CPT | Mod: PBBFAC,PO | Performed by: DERMATOLOGY

## 2019-11-14 PROCEDURE — 99213 OFFICE O/P EST LOW 20 MIN: CPT | Mod: PBBFAC,PO | Performed by: DERMATOLOGY

## 2019-11-14 PROCEDURE — 88305 TISSUE EXAM BY PATHOLOGIST: CPT | Mod: 26,,, | Performed by: PATHOLOGY

## 2019-11-14 PROCEDURE — 17000 DESTRUCT PREMALG LESION: CPT | Mod: 59,S$PBB,, | Performed by: DERMATOLOGY

## 2019-11-14 PROCEDURE — 99999 PR PBB SHADOW E&M-EST. PATIENT-LVL III: CPT | Mod: PBBFAC,,, | Performed by: DERMATOLOGY

## 2019-11-14 PROCEDURE — 17000 PR DESTRUCTION(LASER SURGERY,CRYOSURGERY,CHEMOSURGERY),PREMALIGNANT LESIONS,FIRST LESION: ICD-10-PCS | Mod: 59,S$PBB,, | Performed by: DERMATOLOGY

## 2019-11-14 PROCEDURE — 88342 IMHCHEM/IMCYTCHM 1ST ANTB: CPT | Performed by: PATHOLOGY

## 2019-11-14 PROCEDURE — 88305 TISSUE EXAM BY PATHOLOGIST: ICD-10-PCS | Mod: 26,,, | Performed by: PATHOLOGY

## 2019-11-14 RX ORDER — LEVOFLOXACIN 500 MG/1
TABLET, FILM COATED ORAL
COMMUNITY
End: 2022-07-19

## 2019-11-14 RX ORDER — LISINOPRIL 20 MG/1
TABLET ORAL
COMMUNITY
Start: 2019-07-22 | End: 2019-11-14 | Stop reason: SDUPTHER

## 2019-11-14 RX ORDER — AMLODIPINE BESYLATE 5 MG/1
TABLET ORAL
COMMUNITY
Start: 2019-08-06 | End: 2022-08-26

## 2019-11-14 NOTE — PROGRESS NOTES
Subjective:       Patient ID:  Pebbles Boyle is a 69 y.o. female who presents for   Chief Complaint   Patient presents with    Spot     Anterior scalp, few months, horn-like and nonhealing, no tx     Pt last seen 2-4-19,  shave bx to ant scalp vertex:    FINAL PATHOLOGIC DIAGNOSIS  1. Skin, anterior scalp vertex, shave biopsy:  - INVASIVE SQUAMOUS CELL CARCINOMA.  - THE TUMOR EXTENDS TO THE DEEP AND LATERAL BIOPSY MARGINS.    Here today for spot on anterior scalp for few months.  Horn-like and nonhealing, no tx.  Not sure if same as initial lesion    Also here for reevaluation of scalp s/p bx. Initially referred to Dr. GOODRICH for Mohs.  Spot not identified in visit.  Discussed possible Efudex to scalp, per Dr. GOODRICH.    Hx of AKs tx with cryo      Review of Systems   Constitutional: Negative for fever, chills and fatigue.   Skin: Positive for daily sunscreen use, activity-related sunscreen use and wears hat.   Hematologic/Lymphatic: Bruises/bleeds easily.        Objective:    Physical Exam   Constitutional: She appears well-developed and well-nourished. No distress.   Neurological: She is alert and oriented to person, place, and time. She is not disoriented.   Psychiatric: She has a normal mood and affect.   Skin:   Areas Examined (abnormalities noted in diagram):   Scalp / Hair Palpated and Inspected  Head / Face Inspection Performed  Neck Inspection Performed              Diagram Legend     Erythematous scaling macule/papule c/w actinic keratosis       Vascular papule c/w angioma      Pigmented verrucoid papule/plaque c/w seborrheic keratosis      Yellow umbilicated papule c/w sebaceous hyperplasia      Irregularly shaped tan macule c/w lentigo     1-2 mm smooth white papules consistent with Milia      Movable subcutaneous cyst with punctum c/w epidermal inclusion cyst      Subcutaneous movable cyst c/w pilar cyst      Firm pink to brown papule c/w dermatofibroma      Pedunculated fleshy papule(s) c/w skin tag(s)       Evenly pigmented macule c/w junctional nevus     Mildly variegated pigmented, slightly irregular-bordered macule c/w mildly atypical nevus      Flesh colored to evenly pigmented papule c/w intradermal nevus       Pink pearly papule/plaque c/w basal cell carcinoma      Erythematous hyperkeratotic cursted plaque c/w SCC      Surgical scar with no sign of skin cancer recurrence      Open and closed comedones      Inflammatory papules and pustules      Verrucoid papule consistent consistent with wart     Erythematous eczematous patches and plaques     Dystrophic onycholytic nail with subungual debris c/w onychomycosis     Umbilicated papule    Erythematous-base heme-crusted tan verrucoid plaque consistent with inflamed seborrheic keratosis     Erythematous Silvery Scaling Plaque c/w Psoriasis     See annotation        Assessment / Plan:      Pathology Orders:     Normal Orders This Visit    Specimen to Pathology, Dermatology     Comments:    Number of Specimens:->1  ------------------------->-------------------------  Spec 1 Procedure:->Biopsy  Spec 1 Clinical Impression:->HAK vs SCC  Spec 1 Source:->L paramedian scalp vertex    Questions:    Procedure Type:  Dermatology and skin neoplasms    Number of Specimens:  1    ------------------------:  -------------------------    Spec 1 Procedure:  Biopsy    Spec 1 Clinical Impression:  HAK vs SCC    Spec 1 Source:  L paramedian scalp vertex        Neoplasm of uncertain behavior of skin  -     Specimen to Pathology, Dermatology  Shave biopsy procedure note:    Shave biopsy performed after verbal consent including risk of infection, scar, recurrence, need for additional treatment of site. Area prepped with alcohol, anesthetized with approximately 1.0cc of 1% lidocaine with epinephrine. Lesional tissue shaved with razor blade. Hemostasis achieved with application of aluminum chloride followed by hyfrecation. No complications. Dressing applied. Wound care  explained.    Actinic keratoses, (including 3HAK on scalp)  Cryosurgery Procedure Note    Verbal consent from the patient is obtained and the patient is aware of the precancerous quality and need for treatment of these lesions. Liquid nitrogen cryosurgery is applied to the 5 actinic keratoses, as detailed in the physical exam, to produce a freeze injury. The patient is aware that blisters may form and is instructed on wound care with gentle cleansing and use of vaseline ointment to keep moist until healed. The patient is supplied a handout on cryosurgery and is instructed to call if lesions do not completely resolve.    History of nonmelanoma skin cancer  Area of previous NMSC examined (SCC scalp, not reexcised, difficult to identify site) .    PLAN TO USE EFUDEX TO SCALP BID X 4 WKS OR FLUOROURACIL-CALCIPOTRIENE COMBO    Patient instructed in importance in daily sun protection of at least spf 30. Mineral sunscreen ingredients preferred (Zinc +/- Titanium).   Recommend Elta MD for daily use on face and neck.  Patient encouraged to wear hat for all outdoor exposure.    Also discussed sun avoidance and use of protective clothing.             Follow up in about 6 months (around 5/14/2020).

## 2019-11-14 NOTE — PATIENT INSTRUCTIONS
Shave Biopsy Wound Care    Your doctor has performed a shave biopsy today.  A band aid and vaseline ointment has been placed over the site.  This should remain in place for 24 hours.  It is recommended that you keep the area dry for the first 24 hours.  After 24 hours, you may remove the band aid and wash the area with warm soap and water and apply Vaseline jelly.  Many patients prefer to use Neosporin or Bacitracin ointment.  This is acceptable; however, know that you can develop an allergy to this medication even if you have used it safely for years.  It is important to keep the area moist.  Letting it dry out and get air slows healing time, and will worsen the scar.  Band aid is optional after first 24 hours.      If you notice increasing redness, tenderness, pain, or yellow drainage at the biopsy site, please notify your doctor.  These are signs of an infection.    If your biopsy site is bleeding, apply firm pressure for 15 minutes straight.  Repeat for another 15 minutes, if it is still bleeding.   If the surgical site continues to bleed, then please contact your doctor.       [facility]  [facility]ER[facility]  [address] [place] [address] DRIVE, [address]  [facility] [address]  Dept: [phone]

## 2019-11-27 LAB
FINAL PATHOLOGIC DIAGNOSIS: NORMAL
GROSS: NORMAL
MICROSCOPIC EXAM: NORMAL

## 2019-12-02 NOTE — TELEPHONE ENCOUNTER
[facility] pt, requesting 5FU solution sent to [facility] pharmacy. States it will be free. Original rx sent to [facility] from Dr Pak. Pt never picked it up.    Biopsy again with SCCIS activity surrounded by precancer changes. As discussed during last visit, recommend another round of efudex treatment (has 5% solution). Apply BID x 4 weeks     Skin, left paramedian scalp vertex, shave biopsy:   -FOCAL SQUAMOUS CELL CARCINOMA IN-SITU (ARISING IN A BACKGROUND OF   HYPERTROPHIC ACTINIC KERATOSIS), NARROWLY EXCISED IN THE PLANES OF SECTION   EXAMINED

## 2019-12-03 RX ORDER — FLUOROURACIL 50 MG/ML
SOLUTION TOPICAL
Qty: 20 ML | Refills: 1 | Status: SHIPPED | OUTPATIENT
Start: 2019-12-03 | End: 2022-08-26

## 2020-01-23 DIAGNOSIS — Z12.31 ENCOUNTER FOR SCREENING MAMMOGRAM FOR BREAST CANCER: Primary | ICD-10-CM

## 2020-02-21 ENCOUNTER — OFFICE VISIT (OUTPATIENT)
Dept: DERMATOLOGY | Facility: CLINIC | Age: 70
End: 2020-02-21
Payer: MEDICARE

## 2020-02-21 VITALS — HEIGHT: 64 IN | BODY MASS INDEX: 23.39 KG/M2 | WEIGHT: 137 LBS

## 2020-02-21 DIAGNOSIS — C44.42 SQUAMOUS CELL CARCINOMA OF SCALP: ICD-10-CM

## 2020-02-21 DIAGNOSIS — L27.0 DERMATITIS MEDICAMENTOSA: Primary | ICD-10-CM

## 2020-02-21 PROCEDURE — 99999 PR PBB SHADOW E&M-EST. PATIENT-LVL II: CPT | Mod: PBBFAC,,, | Performed by: DERMATOLOGY

## 2020-02-21 PROCEDURE — 99999 PR PBB SHADOW E&M-EST. PATIENT-LVL II: ICD-10-PCS | Mod: PBBFAC,,, | Performed by: DERMATOLOGY

## 2020-02-21 PROCEDURE — 99212 OFFICE O/P EST SF 10 MIN: CPT | Mod: PBBFAC,PO | Performed by: DERMATOLOGY

## 2020-02-21 PROCEDURE — 99213 PR OFFICE/OUTPT VISIT, EST, LEVL III, 20-29 MIN: ICD-10-PCS | Mod: S$PBB,,, | Performed by: DERMATOLOGY

## 2020-02-21 PROCEDURE — 99213 OFFICE O/P EST LOW 20 MIN: CPT | Mod: S$PBB,,, | Performed by: DERMATOLOGY

## 2020-02-21 NOTE — PROGRESS NOTES
Subjective:       Patient ID:  Pebbles Boyle is a 69 y.o. female who presents for No chief complaint on file.    LOV 11/2019 Aks and biopsy of scalp    FINAL PATHOLOGIC DIAGNOSIS   Skin, anterior scalp vertex, shave biopsy:2/14/2019 - referred to Dr Pak, felt could not find lesion, treated with efudex  -INVASIVE SQUAMOUS CELL CARCINOMA.  - THE TUMOR EXTENDS TO THE DEEP AND LATERAL BIOPSY MARGINS.    left paramedian scalp vertex, shave biopsy: 11/14/2019  -FOCAL SQUAMOUS CELL CARCINOMA IN-SITU (ARISING IN A BACKGROUND OF   HYPERTROPHIC ACTINIC KERATOSIS)    Treating with Efudex, started 1/28 BID, robust reaction, has some scabs today, still using  Pt here today to have scalp examined to see if she needs to continue.       Review of Systems   Constitutional: Negative for fever, chills and fatigue.   Skin: Positive for daily sunscreen use, activity-related sunscreen use and wears hat.   Hematologic/Lymphatic: Bruises/bleeds easily.        Objective:    Physical Exam   Constitutional: She appears well-developed and well-nourished. No distress.   Neurological: She is alert and oriented to person, place, and time. She is not disoriented.   Psychiatric: She has a normal mood and affect.   Skin:   Areas Examined (abnormalities noted in diagram):   Scalp / Hair Palpated and Inspected              Diagram Legend     Erythematous scaling macule/papule c/w actinic keratosis       Vascular papule c/w angioma      Pigmented verrucoid papule/plaque c/w seborrheic keratosis      Yellow umbilicated papule c/w sebaceous hyperplasia      Irregularly shaped tan macule c/w lentigo     1-2 mm smooth white papules consistent with Milia      Movable subcutaneous cyst with punctum c/w epidermal inclusion cyst      Subcutaneous movable cyst c/w pilar cyst      Firm pink to brown papule c/w dermatofibroma      Pedunculated fleshy papule(s) c/w skin tag(s)      Evenly pigmented macule c/w junctional nevus     Mildly variegated  pigmented, slightly irregular-bordered macule c/w mildly atypical nevus      Flesh colored to evenly pigmented papule c/w intradermal nevus       Pink pearly papule/plaque c/w basal cell carcinoma      Erythematous hyperkeratotic cursted plaque c/w SCC      Surgical scar with no sign of skin cancer recurrence      Open and closed comedones      Inflammatory papules and pustules      Verrucoid papule consistent consistent with wart     Erythematous eczematous patches and plaques     Dystrophic onycholytic nail with subungual debris c/w onychomycosis     Umbilicated papule    Erythematous-base heme-crusted tan verrucoid plaque consistent with inflamed seborrheic keratosis     Erythematous Silvery Scaling Plaque c/w Psoriasis     See annotation    Assessment / Plan:        Dermatitis medicamentosa    Squamous cell carcinoma of scalp    biopsy proven SCC x 2 and multiple AKs on vertex  S/p 5% fluorouracil BID x 3.5 weeks with robust reaction  ([facility]- no copay)  Erythema and crust  Hold efudex   Wound care with plain vaseline or aquaphoe  Shampoo as needed  Reexamine in 3-4 weeks         No follow-ups on file.

## 2020-05-14 ENCOUNTER — OFFICE VISIT (OUTPATIENT)
Dept: DERMATOLOGY | Facility: CLINIC | Age: 70
End: 2020-05-14
Payer: MEDICARE

## 2020-05-14 DIAGNOSIS — Z85.828 HISTORY OF NONMELANOMA SKIN CANCER: ICD-10-CM

## 2020-05-14 DIAGNOSIS — L82.1 SEBORRHEIC KERATOSES: ICD-10-CM

## 2020-05-14 DIAGNOSIS — L57.0 ACTINIC KERATOSES: Primary | ICD-10-CM

## 2020-05-14 PROCEDURE — 99999 PR PBB SHADOW E&M-EST. PATIENT-LVL II: CPT | Mod: PBBFAC,,, | Performed by: DERMATOLOGY

## 2020-05-14 PROCEDURE — 17003 DESTRUCT PREMALG LES 2-14: CPT | Mod: 59,PBBFAC,PO | Performed by: DERMATOLOGY

## 2020-05-14 PROCEDURE — 17003 DESTRUCT PREMALG LES 2-14: CPT | Mod: S$PBB,,, | Performed by: DERMATOLOGY

## 2020-05-14 PROCEDURE — 17003 DESTRUCTION, PREMALIGNANT LESIONS; SECOND THROUGH 14 LESIONS: ICD-10-PCS | Mod: S$PBB,,, | Performed by: DERMATOLOGY

## 2020-05-14 PROCEDURE — 99213 PR OFFICE/OUTPT VISIT, EST, LEVL III, 20-29 MIN: ICD-10-PCS | Mod: 25,S$PBB,, | Performed by: DERMATOLOGY

## 2020-05-14 PROCEDURE — 99213 OFFICE O/P EST LOW 20 MIN: CPT | Mod: 25,S$PBB,, | Performed by: DERMATOLOGY

## 2020-05-14 PROCEDURE — 17000 DESTRUCT PREMALG LESION: CPT | Mod: S$PBB,,, | Performed by: DERMATOLOGY

## 2020-05-14 PROCEDURE — 17000 PR DESTRUCTION(LASER SURGERY,CRYOSURGERY,CHEMOSURGERY),PREMALIGNANT LESIONS,FIRST LESION: ICD-10-PCS | Mod: S$PBB,,, | Performed by: DERMATOLOGY

## 2020-05-14 PROCEDURE — 17000 DESTRUCT PREMALG LESION: CPT | Mod: 59,PBBFAC,PO | Performed by: DERMATOLOGY

## 2020-05-14 PROCEDURE — 99999 PR PBB SHADOW E&M-EST. PATIENT-LVL II: ICD-10-PCS | Mod: PBBFAC,,, | Performed by: DERMATOLOGY

## 2020-05-14 PROCEDURE — 99212 OFFICE O/P EST SF 10 MIN: CPT | Mod: PBBFAC,PO,25 | Performed by: DERMATOLOGY

## 2020-05-14 NOTE — PROGRESS NOTES
"  Subjective:       Patient ID:  Pebbles Boyle is a 69 y.o. female who presents for   Chief Complaint   Patient presents with    Follow-up     Reevaluate scalp s/p bx X2      Pt last seen 2-21-20 for SCCIS tx with Efudex on scalp.  Referred to Dr Pak in the past for Mohs (multiple lesions on scalp vertex), "could not find the spot"    Here today for reevaluation of scalp.  Pt noticed one spot on anterior scalp. No sx, no tx.    FINAL PATHOLOGIC DIAGNOSIS   Skin, anterior scalp vertex, shave biopsy:2/14/2019 - referred to Dr Pak, felt could not find lesion, treated with efudex  -INVASIVE SQUAMOUS CELL CARCINOMA.  - THE TUMOR EXTENDS TO THE DEEP AND LATERAL BIOPSY MARGINS.     left paramedian scalp vertex, shave biopsy: 11/14/2019  -FOCAL SQUAMOUS CELL CARCINOMA IN-SITU (ARISING IN A BACKGROUND OF   HYPERTROPHIC ACTINIC KERATOSIS)    Hx of AKs tx with cryo      Review of Systems   Constitutional: Negative for fever, chills and fatigue.   Skin: Positive for daily sunscreen use, activity-related sunscreen use and wears hat.   Hematologic/Lymphatic: Bruises/bleeds easily.        Objective:    Physical Exam   Constitutional: She appears well-developed and well-nourished. No distress.   Neurological: She is alert and oriented to person, place, and time. She is not disoriented.   Psychiatric: She has a normal mood and affect.   Skin:   Areas Examined (abnormalities noted in diagram):   Scalp / Hair Palpated and Inspected  Chest / Axilla Inspection Performed  RUE Inspected  LUE Inspection Performed                   Diagram Legend     Erythematous scaling macule/papule c/w actinic keratosis       Vascular papule c/w angioma      Pigmented verrucoid papule/plaque c/w seborrheic keratosis      Yellow umbilicated papule c/w sebaceous hyperplasia      Irregularly shaped tan macule c/w lentigo     1-2 mm smooth white papules consistent with Milia      Movable subcutaneous cyst with punctum c/w epidermal " inclusion cyst      Subcutaneous movable cyst c/w pilar cyst      Firm pink to brown papule c/w dermatofibroma      Pedunculated fleshy papule(s) c/w skin tag(s)      Evenly pigmented macule c/w junctional nevus     Mildly variegated pigmented, slightly irregular-bordered macule c/w mildly atypical nevus      Flesh colored to evenly pigmented papule c/w intradermal nevus       Pink pearly papule/plaque c/w basal cell carcinoma      Erythematous hyperkeratotic cursted plaque c/w SCC      Surgical scar with no sign of skin cancer recurrence      Open and closed comedones      Inflammatory papules and pustules      Verrucoid papule consistent consistent with wart     Erythematous eczematous patches and plaques     Dystrophic onycholytic nail with subungual debris c/w onychomycosis     Umbilicated papule    Erythematous-base heme-crusted tan verrucoid plaque consistent with inflamed seborrheic keratosis     Erythematous Silvery Scaling Plaque c/w Psoriasis     See annotation      Marked improvement; 2 lesions today, treated with cryotherapy        Assessment / Plan:        Actinic keratoses  Cryosurgery Procedure Note    Verbal consent from the patient is obtained and the patient is aware of the precancerous quality and need for treatment of these lesions. Liquid nitrogen cryosurgery is applied to the 2 actinic keratoses, as detailed in the physical exam, to produce a freeze injury. The patient is aware that blisters may form and is instructed on wound care with gentle cleansing and use of vaseline ointment to keep moist until healed. The patient is supplied a handout on cryosurgery and is instructed to call if lesions do not completely resolve.    Seborrheic keratoses  These are benign inherited growths without a malignant potential. Reassurance given to patient. No treatment is necessary.     History of nonmelanoma skin cancer  Area of previous NMSC (scalp vertex) examined. Site well healed with no signs of  recurrence.  Upper body skin examination performed as noted in physical examination. No lesions suspicious for malignancy noted.    Patient instructed in importance in daily sun protection of at least spf 30. Mineral sunscreen ingredients preferred (Zinc +/- Titanium).   Recommend Elta MD for daily use on face and neck.  Patient encouraged to wear hat for all outdoor exposure.   Also discussed sun avoidance and use of protective clothing.             Follow up in about 6 months (around 11/14/2020).

## 2020-05-14 NOTE — PATIENT INSTRUCTIONS

## 2020-06-25 ENCOUNTER — HOSPITAL ENCOUNTER (OUTPATIENT)
Dept: RADIOLOGY | Facility: HOSPITAL | Age: 70
Discharge: HOME | End: 2020-06-25
Attending: NURSE PRACTITIONER
Payer: MEDICARE

## 2020-06-25 VITALS — WEIGHT: 136.88 LBS | BODY MASS INDEX: 23.37 KG/M2 | HEIGHT: 64 IN

## 2020-06-25 DIAGNOSIS — Z12.31 ENCOUNTER FOR SCREENING MAMMOGRAM FOR BREAST CANCER: ICD-10-CM

## 2020-06-25 PROCEDURE — 77063 BREAST TOMOSYNTHESIS BI: CPT | Mod: 26,,, | Performed by: RADIOLOGY

## 2020-06-25 PROCEDURE — 77067 SCR MAMMO BI INCL CAD: CPT | Mod: TC

## 2020-06-25 PROCEDURE — 77063 MAMMO DIGITAL SCREENING BILAT WITH TOMOSYNTHESIS_CAD: ICD-10-PCS | Mod: 26,,, | Performed by: RADIOLOGY

## 2020-06-25 PROCEDURE — 77067 SCR MAMMO BI INCL CAD: CPT | Mod: 26,,, | Performed by: RADIOLOGY

## 2020-06-25 PROCEDURE — 77067 MAMMO DIGITAL SCREENING BILAT WITH TOMOSYNTHESIS_CAD: ICD-10-PCS | Mod: 26,,, | Performed by: RADIOLOGY

## 2021-04-29 ENCOUNTER — PATIENT MESSAGE (OUTPATIENT)
Dept: RESEARCH | Facility: HOSPITAL | Age: 71
End: 2021-04-29

## 2021-10-29 ENCOUNTER — HOSPITAL ENCOUNTER (OUTPATIENT)
Dept: RADIOLOGY | Facility: HOSPITAL | Age: 71
Discharge: HOME | End: 2021-10-29
Attending: NURSE PRACTITIONER
Payer: MEDICARE

## 2021-10-29 VITALS — BODY MASS INDEX: 23.56 KG/M2 | HEIGHT: 64 IN | WEIGHT: 138 LBS

## 2021-10-29 DIAGNOSIS — Z12.31 ENCOUNTER FOR SCREENING MAMMOGRAM FOR MALIGNANT NEOPLASM OF BREAST: ICD-10-CM

## 2021-10-29 PROCEDURE — 77063 MAMMO DIGITAL SCREENING BILAT WITH TOMO: ICD-10-PCS | Mod: 26,,, | Performed by: RADIOLOGY

## 2021-10-29 PROCEDURE — 77067 SCR MAMMO BI INCL CAD: CPT | Mod: TC

## 2021-10-29 PROCEDURE — 77063 BREAST TOMOSYNTHESIS BI: CPT | Mod: 26,,, | Performed by: RADIOLOGY

## 2021-10-29 PROCEDURE — 77067 MAMMO DIGITAL SCREENING BILAT WITH TOMO: ICD-10-PCS | Mod: 26,,, | Performed by: RADIOLOGY

## 2021-10-29 PROCEDURE — 77067 SCR MAMMO BI INCL CAD: CPT | Mod: 26,,, | Performed by: RADIOLOGY

## 2022-07-08 ENCOUNTER — TELEPHONE (OUTPATIENT)
Dept: HEMATOLOGY/ONCOLOGY | Facility: CLINIC | Age: 72
End: 2022-07-08
Payer: MEDICARE

## 2022-07-08 NOTE — NURSING
Spoke to Mrs Boyle regarding referral to Oncology she asked to wait to schedule until after she sees Dr. Mccloud on Tuesday, July 12 advised patient that I will follow up after her visit she verbalized understanding   Oncology Navigation   Intake  Cancer Type: GI (Malignant Neoplasm of Cecum)  Internal / External Referral: External (EMAIL;[facility] Navigation)  Date of Referral: 7/6/2022 (Email sent 7/7/2022)  Initial Nurse Navigator Contact: 7/8/2022  Referral to Initial Contact Timeline (days): 2  Date Worked: 7/8/2022     Treatment     Surgical Oncologist: Dr. Pranav Mccloud  Consult Date: 7/12/2022                          Acuity      Follow Up  Follow up in about 4 days (around 7/12/2022) for f/u After appt with Surgery .

## 2022-07-12 ENCOUNTER — LAB VISIT (OUTPATIENT)
Dept: LAB | Facility: HOSPITAL | Age: 72
End: 2022-07-12
Attending: SURGERY
Payer: MEDICARE

## 2022-07-12 ENCOUNTER — OFFICE VISIT (OUTPATIENT)
Dept: SURGERY | Facility: CLINIC | Age: 72
End: 2022-07-12
Payer: MEDICARE

## 2022-07-12 VITALS
RESPIRATION RATE: 16 BRPM | HEIGHT: 64 IN | SYSTOLIC BLOOD PRESSURE: 210 MMHG | WEIGHT: 138 LBS | DIASTOLIC BLOOD PRESSURE: 66 MMHG | BODY MASS INDEX: 23.56 KG/M2 | TEMPERATURE: 98 F | HEART RATE: 87 BPM

## 2022-07-12 DIAGNOSIS — C18.2 MALIGNANT NEOPLASM OF ASCENDING COLON: Primary | ICD-10-CM

## 2022-07-12 DIAGNOSIS — C18.2 MALIGNANT NEOPLASM OF ASCENDING COLON: ICD-10-CM

## 2022-07-12 LAB
ALBUMIN SERPL BCP-MCNC: 3.9 G/DL (ref 3.5–5.2)
ALP SERPL-CCNC: 85 U/L (ref 55–135)
ALT SERPL W/O P-5'-P-CCNC: 17 U/L (ref 10–44)
ANION GAP SERPL CALC-SCNC: 8 MMOL/L (ref 8–16)
AST SERPL-CCNC: 18 U/L (ref 10–40)
BASOPHILS # BLD AUTO: 0.01 K/UL (ref 0–0.2)
BASOPHILS NFR BLD: 0.1 % (ref 0–1.9)
BILIRUB SERPL-MCNC: 1.1 MG/DL (ref 0.1–1)
BUN SERPL-MCNC: 13 MG/DL (ref 8–23)
CALCIUM SERPL-MCNC: 8.8 MG/DL (ref 8.7–10.5)
CEA SERPL-MCNC: 5 NG/ML (ref 0–5)
CHLORIDE SERPL-SCNC: 90 MMOL/L (ref 95–110)
CO2 SERPL-SCNC: 28 MMOL/L (ref 23–29)
CREAT SERPL-MCNC: 0.8 MG/DL (ref 0.5–1.4)
DIFFERENTIAL METHOD: ABNORMAL
EOSINOPHIL # BLD AUTO: 0 K/UL (ref 0–0.5)
EOSINOPHIL NFR BLD: 0 % (ref 0–8)
ERYTHROCYTE [DISTWIDTH] IN BLOOD BY AUTOMATED COUNT: 13.3 % (ref 11.5–14.5)
EST. GFR  (AFRICAN AMERICAN): >60 ML/MIN/1.73 M^2
EST. GFR  (NON AFRICAN AMERICAN): >60 ML/MIN/1.73 M^2
GLUCOSE SERPL-MCNC: 127 MG/DL (ref 70–110)
HCT VFR BLD AUTO: 31.7 % (ref 37–48.5)
HGB BLD-MCNC: 11 G/DL (ref 12–16)
IMM GRANULOCYTES # BLD AUTO: 0.02 K/UL (ref 0–0.04)
IMM GRANULOCYTES NFR BLD AUTO: 0.3 % (ref 0–0.5)
LYMPHOCYTES # BLD AUTO: 1.2 K/UL (ref 1–4.8)
LYMPHOCYTES NFR BLD: 17.4 % (ref 18–48)
MCH RBC QN AUTO: 28.3 PG (ref 27–31)
MCHC RBC AUTO-ENTMCNC: 34.7 G/DL (ref 32–36)
MCV RBC AUTO: 82 FL (ref 82–98)
MONOCYTES # BLD AUTO: 0.4 K/UL (ref 0.3–1)
MONOCYTES NFR BLD: 5.5 % (ref 4–15)
NEUTROPHILS # BLD AUTO: 5.2 K/UL (ref 1.8–7.7)
NEUTROPHILS NFR BLD: 76.7 % (ref 38–73)
NRBC BLD-RTO: 0 /100 WBC
PLATELET # BLD AUTO: 209 K/UL (ref 150–450)
PMV BLD AUTO: 9.5 FL (ref 9.2–12.9)
POTASSIUM SERPL-SCNC: 3.7 MMOL/L (ref 3.5–5.1)
PROT SERPL-MCNC: 7.7 G/DL (ref 6–8.4)
RBC # BLD AUTO: 3.89 M/UL (ref 4–5.4)
SODIUM SERPL-SCNC: 126 MMOL/L (ref 136–145)
WBC # BLD AUTO: 6.77 K/UL (ref 3.9–12.7)

## 2022-07-12 PROCEDURE — 36415 COLL VENOUS BLD VENIPUNCTURE: CPT | Performed by: SURGERY

## 2022-07-12 PROCEDURE — 85025 COMPLETE CBC W/AUTO DIFF WBC: CPT | Performed by: SURGERY

## 2022-07-12 PROCEDURE — 99204 PR OFFICE/OUTPT VISIT, NEW, LEVL IV, 45-59 MIN: ICD-10-PCS | Mod: S$GLB,,, | Performed by: SURGERY

## 2022-07-12 PROCEDURE — 99204 OFFICE O/P NEW MOD 45 MIN: CPT | Mod: S$GLB,,, | Performed by: SURGERY

## 2022-07-12 PROCEDURE — 80053 COMPREHEN METABOLIC PANEL: CPT | Performed by: SURGERY

## 2022-07-12 PROCEDURE — 82378 CARCINOEMBRYONIC ANTIGEN: CPT | Performed by: SURGERY

## 2022-07-12 NOTE — PROGRESS NOTES
Subjective:       Patient ID: Pebbles Boyle is a 71 y.o. female.    Chief Complaint: colon cancer    HPI:  71 year old female referred to the office with colon cancer ascending colon. She presented with fatigue. Found to be anemic. H and H was around 7.5 -8  per patient. Work up involved colonoscopy which showed a large fungating mass in the cecum and a 4 cm sessile polyp in the mid ascending colon. Path not back yet but cecal mass was large involving 2/3 circumference of the lumen and appeared malignant.  The ascending colon polyp also did not appear benign as well. She has no obstructive sytmpoms. She does not notice any melena.  She has family history of colon cancer in 3 siblings. She had not had colon cancer screening in the past.  She has past surgical history of right partial nephrectomy via flank incision, open cholecystectomy 40 years ago. She is a lifetime smoker.  No history of cardiac disease per patient.  Last cardiac catheterization 2014 and was okay per patient.    Past Medical History:   Diagnosis Date    Diabetes mellitus     Endometrial cancer     Full dentures     Hypertension     Macular degeneration     PVC (premature ventricular contraction)     Squamous cell carcinoma of skin      Past Surgical History:   Procedure Laterality Date    Bilateral Tubal ligation      CARDIAC CATHETERIZATION  2014    Dr. womack   -    negative    EYE SURGERY  1987    RK    GALLBLADDER SURGERY      LITHOTRIPSY      TONSILLECTOMY       Review of patient's allergies indicates:   Allergen Reactions    Tramadol Nausea And Vomiting    Codeine Other (See Comments)     Chest pains    Hydrocodone Other (See Comments)     hypotension     Medication List with Changes/Refills   Current Medications    ALBUTEROL (PROVENTIL/VENTOLIN HFA) 90 MCG/ACTUATION INHALER    inhale 2 puff by inhalation route  every 4 - 6 hours as needed as needed    AMLODIPINE (NORVASC) 5 MG TABLET    amlodipine 5 mg tablet    ANTIOX  #8/OM3/DHA/EPA/LUT/ZEAX (PRESERVISION AREDS 2, OMEGA-3, ORAL)    Take 1 capsule by mouth 2 (two) times daily.     CLONAZEPAM (KLONOPIN) 0.5 MG TABLET    Take 0.5 mg by mouth 2 (two) times daily as needed for Anxiety.    CLONIDINE (CATAPRES) 0.1 MG TABLET    Take 1 tablet (0.1 mg total) by mouth 2 (two) times daily. Hold if heart rate < 60/min and SBP < 100 mmHg.    CYCLOBENZAPRINE (FLEXERIL) 10 MG TABLET    Take 10 mg by mouth 3 (three) times daily as needed for Muscle spasms.    FLUOROURACIL 5% 5 % SOLN    Apply twice each day to scalp as directed    GLIPIZIDE (GLUCOTROL) 5 MG TABLET    Take 2.5 mg by mouth 2 (two) times daily before meals.     GUAIFENESIN (MUCINEX ORAL)    Take by mouth.    LEVOFLOXACIN (LEVAQUIN) 500 MG TABLET    levofloxacin 500 mg tablet    LISINOPRIL-HYDROCHLOROTHIAZIDE (PRINZIDE,ZESTORETIC) 20-25 MG TAB    Take 1 tablet by mouth every morning.    METHOCARBAMOL (ROBAXIN) 500 MG TAB    Take 500 mg by mouth 4 (four) times daily.    METOPROLOL TARTRATE (LOPRESSOR) 100 MG TABLET    Take 100 mg by mouth 2 (two) times daily.    TERAZOSIN (HYTRIN) 1 MG CAPSULE    Take 1 mg by mouth every evening.    VITAMINS  A,C,E-ZINC-COPPER [id number] UNIT-MG-UNIT CAP    Take by mouth.     Family History   Problem Relation Age of Onset    Breast cancer Sister     Melanoma Neg Hx     Psoriasis Neg Hx     Lupus Neg Hx     Eczema Neg Hx      Social History     Socioeconomic History    Marital status:    Tobacco Use    Smoking status: Current Every Day Smoker     Packs/day: 0.50     Years: 50.00     Pack years: 25.00    Smokeless tobacco: Never Used   Substance and Sexual Activity    Alcohol use: Yes     Comment: rarely    Drug use: No         Review of Systems   Constitutional: Negative for appetite change, chills, fever and unexpected weight change.   HENT: Negative for hearing loss, rhinorrhea, sore throat and voice change.    Eyes: Negative for photophobia and visual disturbance.    Respiratory: Negative for cough, choking and shortness of breath.    Cardiovascular: Negative for chest pain, palpitations and leg swelling.   Gastrointestinal: Negative for abdominal pain, blood in stool, constipation, diarrhea, nausea and vomiting.   Endocrine: Negative for cold intolerance, heat intolerance, polydipsia and polyuria.   Musculoskeletal: Negative for arthralgias, back pain, joint swelling and neck stiffness.   Skin: Negative for color change, pallor and rash.   Neurological: Negative for dizziness, seizures, syncope and headaches.   Hematological: Negative for adenopathy. Does not bruise/bleed easily.   Psychiatric/Behavioral: Negative for agitation, behavioral problems and confusion.       Objective:      Physical Exam  Constitutional:       General: She is not in acute distress.     Appearance: Normal appearance. She is well-developed. She is not toxic-appearing.   HENT:      Head: Normocephalic and atraumatic. No abrasion or laceration.      Right Ear: External ear normal.      Left Ear: External ear normal.      Nose: Nose normal.   Eyes:      Pupils: Pupils are equal, round, and reactive to light.   Neck:      Trachea: Trachea and phonation normal. No tracheal deviation.   Cardiovascular:      Rate and Rhythm: Normal rate and regular rhythm.   Pulmonary:      Effort: Pulmonary effort is normal. No tachypnea, accessory muscle usage or respiratory distress.   Abdominal:      General: There is no distension.      Palpations: Abdomen is soft. There is no mass.      Tenderness: There is no abdominal tenderness. There is no guarding.      Hernia: No hernia is present.   Musculoskeletal:      Cervical back: Neck supple. Normal range of motion.   Lymphadenopathy:      Cervical: No cervical adenopathy.   Skin:     General: Skin is warm.   Neurological:      Mental Status: She is alert and oriented to person, place, and time.      Coordination: Coordination normal.      Gait: Gait normal.    Psychiatric:         Speech: Speech normal.         Behavior: Behavior normal.         Assessment/Plan:   Diagnoses and all orders for this visit:    Malignant neoplasm of ascending colon  -     CT Chest Abdomen Pelvis With Contrast (xpd); Future  -     CBC W/ AUTO DIFFERENTIAL; Future  -     COMPREHENSIVE METABOLIC PANEL; Future  -     CEA; Future    Patient found to have colon cancer in the cecum, ascending colon.  Will plan for right colectomy in the upcoming weeks.  Will order CBC, CMP and CEA today.  Will order CT of the chest abdomen pelvis for metastatic workup.  She has a very strong smoking history.  Will refer back to her cardiologist for cardiac clearance.  Follow with me in the next couple weeks to review CT scan and to set definite surgical date.

## 2022-07-18 ENCOUNTER — HOSPITAL ENCOUNTER (OUTPATIENT)
Dept: RADIOLOGY | Facility: HOSPITAL | Age: 72
Discharge: HOME | End: 2022-07-18
Attending: SURGERY
Payer: MEDICARE

## 2022-07-18 DIAGNOSIS — C18.2 MALIGNANT NEOPLASM OF ASCENDING COLON: ICD-10-CM

## 2022-07-18 DIAGNOSIS — C18.0 MALIGNANT NEOPLASM OF CECUM: Primary | ICD-10-CM

## 2022-07-18 PROCEDURE — 74177 CT ABD & PELVIS W/CONTRAST: CPT | Mod: TC

## 2022-07-18 PROCEDURE — 25500020 PHARM REV CODE 255: Performed by: SURGERY

## 2022-07-18 PROCEDURE — 71260 CT THORAX DX C+: CPT | Mod: TC

## 2022-07-18 RX ADMIN — IOHEXOL 100 ML: 350 INJECTION, SOLUTION INTRAVENOUS at 11:07

## 2022-07-18 NOTE — NURSING
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 Pfeiffer)  First Available Date vs. Scheduled Date (days): 0     Treatment     Surgical Oncologist: Dr. Pranav Mccloud  Consult Date: 7/12/2022          Procedures: CT  CT Schedule Date: 7/18/2022                 Acuity      Follow Up  No follow-ups on file.

## 2022-07-19 ENCOUNTER — OFFICE VISIT (OUTPATIENT)
Dept: HEMATOLOGY/ONCOLOGY | Facility: CLINIC | Age: 72
End: 2022-07-19
Payer: MEDICARE

## 2022-07-19 ENCOUNTER — TELEPHONE (OUTPATIENT)
Dept: HEMATOLOGY/ONCOLOGY | Facility: CLINIC | Age: 72
End: 2022-07-19

## 2022-07-19 VITALS
BODY MASS INDEX: 22.25 KG/M2 | DIASTOLIC BLOOD PRESSURE: 72 MMHG | WEIGHT: 130.31 LBS | SYSTOLIC BLOOD PRESSURE: 186 MMHG | HEART RATE: 72 BPM | OXYGEN SATURATION: 99 % | HEIGHT: 64 IN

## 2022-07-19 DIAGNOSIS — C18.0 MALIGNANT NEOPLASM OF CECUM: ICD-10-CM

## 2022-07-19 PROCEDURE — 99205 PR OFFICE/OUTPT VISIT, NEW, LEVL V, 60-74 MIN: ICD-10-PCS | Mod: S$PBB,,, | Performed by: INTERNAL MEDICINE

## 2022-07-19 PROCEDURE — 99205 OFFICE O/P NEW HI 60 MIN: CPT | Mod: S$PBB,,, | Performed by: INTERNAL MEDICINE

## 2022-07-19 PROCEDURE — 99213 OFFICE O/P EST LOW 20 MIN: CPT | Mod: PBBFAC | Performed by: INTERNAL MEDICINE

## 2022-07-19 PROCEDURE — 99999 PR PBB SHADOW E&M-EST. PATIENT-LVL III: ICD-10-PCS | Mod: PBBFAC,,, | Performed by: INTERNAL MEDICINE

## 2022-07-19 PROCEDURE — 99999 PR PBB SHADOW E&M-EST. PATIENT-LVL III: CPT | Mod: PBBFAC,,, | Performed by: INTERNAL MEDICINE

## 2022-07-19 RX ORDER — SUCRALFATE 1 G/1
1 TABLET ORAL 4 TIMES DAILY
COMMUNITY
Start: 2022-05-02 | End: 2022-08-26

## 2022-07-19 RX ORDER — FERROUS SULFATE TAB 325 MG (65 MG ELEMENTAL FE) 325 (65 FE) MG
TAB ORAL EVERY OTHER DAY
COMMUNITY
Start: 2022-06-16 | End: 2024-03-22

## 2022-07-19 NOTE — LETTER
July 19, 2022        Janeth Quezada, NP  [address] [place] [address] [place] [address]             [facility]  [address] [place] [address]  Phone: [phone]   Patient: Pebbles Boyle   MR Number: [id number]   YOB: 1950   Date of Visit: 7/19/2022       Dear Dr. Quezada:    Thank you for referring Pebbles Boyle to me for evaluation of ascending colon cancer. Below are the relevant portions of my assessment and plan of care.  If you have questions, please do not hesitate to call me. I look forward to following Pebbles along with you.    Sincerely,      Marcio Pfeiffer MD           CC  MD Roseann Su MD Francis J. Petitto III, MD

## 2022-07-19 NOTE — PROGRESS NOTES
Chief complaint:  Colon cancer    History of present illness:  The patient is a 71-year-old white female who presented with generalized fatigue malaise, was found to have iron deficiency anemia, underwent screening colonoscopy, and was found to have a fungating mass involving the cecum.    Past medical history:  1. Hypertension  2. Diabetes mellitus  3. History of endometrial cancer  4. Macular degeneration  5. Wears dentures  6. Premature ventricular contractions  7. History resected squamous cell carcinoma of the skin  8. Status post bilateral tubal ligation  9. Status post radial keratotomy  10. Status post cholecystectomy  11. Renal lithiasis-status post lithotripsy  12. Status post tonsillectomy  13. Status post partial nephrectomy for benign neoplasm.    Allergies:  1. Tramadol  3. Codeine  4. Hydrocodone    Medications:    Current Outpatient Medications:     albuterol (PROVENTIL/VENTOLIN HFA) 90 mcg/actuation inhaler, inhale 2 puff by inhalation route  every 4 - 6 hours as needed as needed, Disp: , Rfl:     amLODIPine (NORVASC) 5 MG tablet, amlodipine 5 mg tablet, Disp: , Rfl:     ANTIOX #8/OM3/DHA/EPA/LUT/ZEAX (PRESERVISION AREDS 2, OMEGA-3, ORAL), Take 1 capsule by mouth 2 (two) times daily. , Disp: , Rfl:     clonazePAM (KLONOPIN) 0.5 MG tablet, Take 0.5 mg by mouth 2 (two) times daily as needed for Anxiety., Disp: , Rfl:     cloNIDine (CATAPRES) 0.1 MG tablet, Take 1 tablet (0.1 mg total) by mouth 2 (two) times daily. Hold if heart rate < 60/min and SBP < 100 mmHg., Disp: 60 tablet, Rfl: 0    cyclobenzaprine (FLEXERIL) 10 MG tablet, Take 10 mg by mouth 3 (three) times daily as needed for Muscle spasms., Disp: , Rfl:     fluorouracil 5% 5 % Soln, Apply twice each day to scalp as directed (Patient not taking: Reported on 5/14/2020), Disp: 20 mL, Rfl: 1    glipiZIDE (GLUCOTROL) 5 MG tablet, Take 2.5 mg by mouth 2 (two) times daily before meals. , Disp: , Rfl:     GUAIFENESIN (MUCINEX ORAL), Take by  mouth., Disp: , Rfl:     levoFLOXacin (LEVAQUIN) 500 MG tablet, levofloxacin 500 mg tablet, Disp: , Rfl:     lisinopril-hydrochlorothiazide (PRINZIDE,ZESTORETIC) 20-25 mg Tab, Take 1 tablet by mouth every morning., Disp: , Rfl:     methocarbamol (ROBAXIN) 500 MG Tab, Take 500 mg by mouth 4 (four) times daily., Disp: , Rfl:     metoprolol tartrate (LOPRESSOR) 100 MG tablet, Take 100 mg by mouth 2 (two) times daily., Disp: , Rfl:     terazosin (HYTRIN) 1 MG capsule, Take 1 mg by mouth every evening., Disp: , Rfl:     vitamins  A,C,E-zinc-copper [phone] unit-mg-unit Cap, Take by mouth., Disp: , Rfl:   No current facility-administered medications for this visit.     Family/social history:  Patient smokes 1/2 pack cigarettes daily and has done so for 25 years.  No smokeless tobacco.  History of alcohol abuse.  Sister suffered from breast cancer.  Patient also has another sister and 2 brothers who suffered from colorectal carcinoma.    Physical examination:  Well-developed, well-nourished, elderly, white female, no acute distress, who has a weight of 130.5 lb.  VITAL SIGNS: Documented  and reviewed this visit.  HEENT: Normocephalic, atraumatic. Oral mucosa pink and moist. Lips without lesions. Tongue midline. Oropharynx clear. Nonicteric sclerae.   NECK: Supple, no adenopathy. No carotid bruits, thyromegaly or thyroid nodule.   HEART: Regular rate and rhythm without murmur, gallop or rub.   LUNGS: Clear to auscultation bilaterally. Normal respiratory effort.   ABDOMEN: Soft, nontender, nondistended with positive normoactive bowel sounds, no hepatosplenomegaly.   EXTREMITIES: No cyanosis, clubbing or edema. Distal pulses are intact.   AXILLAE AND GROIN: No palpable pathologic lymphadenopathy is appreciated.   SKIN: Intact/turgor normal   NEUROLOGIC: Cranial nerves II-XII grossly intact. Motor: Good muscle bulk and tone. Strength/sensory 5/5 throughout. Gait stable.     Laboratory:  Studies obtained  07/12/2022.  White count 6.8, hemoglobin 11, hematocrit 31.7, MCV 82, RDW 13.3, platelets 209, GFR is 5200.  Sodium 126, potassium 3.7, chloride 90, CO2 28, BUN 13, creatinine 0.8, glucose 127, calcium 8.8, total bilirubin 1.1, liver function test within normal limits, GFR greater than 60.  CEA 5.0    CT scanning of the chest/abdomen/pelvis with contrast:  Study obtained 07/18/2022.  - Mild degradation by motion.  - Concentric wall thickening involving the proximal ascending colon presumed to represent known colon neoplasm. An adjacent pericolonic lymph node medial to the involved segment of the colon measures 5 mm in short axis dimension.  - Tiny nodular densities observed within the right lung of questionable significance. These could be assessed for stability at the time of follow-up examinations.  - Prominent arteriosclerosis involving the thoracoabdominal aorta with fusiform dilatation of the infrarenal segment of the aorta. Calcified plaque formation results in approximately 50% luminal stenosis in the infrarenal segment of the abdominal aorta. - There appears to be significant narrowing of the origin of the left subclavian artery.  - Changes apparently related to previous partial right nephrectomy.  - Thyroid nodules which could be further investigated with ultrasound if warranted.      Pathology:  Taken at the time of endoscopy was remarkable only for high-grade dysplasia arising inside of an adenomatous polyp.    Impression:  1. Carcinoma of the ascending colon.  2. Iron deficiency anemia due to chronic GI blood loss.    Plan:  1. Proceed to surgical resection.  2. Return to clinic postoperatively with interval CBC, CMP, LDH, magnesium, CEA, iron studies, and surgical pathology.  3. [facility] genetic testing for predilection to colon carcinoma in light of patient's family history.    This note was created using voice recognition software and may contain grammatical errors.

## 2022-07-19 NOTE — TELEPHONE ENCOUNTER
This pt escorted to the lab for blood draw for [facility] genetic testing. TRF and other documentation submitted online. Blood sample enclosed in test kit and placed in FedEx package for shipment. Tracking # [id number].

## 2022-07-19 NOTE — Clinical Note
Proceed with surgical resection. See me postoperatively with CBC, CMP, LDH, magnesium, CEA, iron studies and surgical pathology.

## 2022-07-27 ENCOUNTER — TELEPHONE (OUTPATIENT)
Dept: HEMATOLOGY/ONCOLOGY | Facility: CLINIC | Age: 72
End: 2022-07-27
Payer: MEDICARE

## 2022-07-27 NOTE — TELEPHONE ENCOUNTER
This nurse called pt to inquire if she has a scheduled sx date. She stated that she does not and cannot get her cardiac clearance/stress test until 8/5.    ----- Message from Jess Malin RN sent at 7/19/2022 10:33 AM CDT -----  Regarding: call pt to find out sx date  When is her sx?

## 2022-08-01 DIAGNOSIS — I11.9 MALIGNANT HYPERTENSIVE HEART DISEASE WITHOUT HEART FAILURE: ICD-10-CM

## 2022-08-01 DIAGNOSIS — Z01.818 OTHER SPECIFIED PRE-OPERATIVE EXAMINATION: Primary | ICD-10-CM

## 2022-08-02 ENCOUNTER — TELEPHONE (OUTPATIENT)
Dept: SURGERY | Facility: CLINIC | Age: 72
End: 2022-08-02
Payer: MEDICARE

## 2022-08-02 NOTE — TELEPHONE ENCOUNTER
Spoke with patient informed message will be reviewed with Dr Mccloud office will f/u with available dates when cardiac clearance received . Understanding verbalized

## 2022-08-05 ENCOUNTER — HOSPITAL ENCOUNTER (OUTPATIENT)
Dept: RADIOLOGY | Facility: HOSPITAL | Age: 72
Discharge: HOME | End: 2022-08-05
Attending: NURSE PRACTITIONER
Payer: MEDICARE

## 2022-08-05 ENCOUNTER — CLINICAL SUPPORT (OUTPATIENT)
Dept: CARDIOLOGY | Facility: HOSPITAL | Age: 72
End: 2022-08-05
Attending: NURSE PRACTITIONER
Payer: MEDICARE

## 2022-08-05 DIAGNOSIS — I11.9 MALIGNANT HYPERTENSIVE HEART DISEASE WITHOUT HEART FAILURE: ICD-10-CM

## 2022-08-05 DIAGNOSIS — Z01.818 OTHER SPECIFIED PRE-OPERATIVE EXAMINATION: ICD-10-CM

## 2022-08-05 LAB
CV STRESS BASE HR: 81 BPM
DIASTOLIC BLOOD PRESSURE: 68 MMHG
OHS CV CPX 1 MINUTE RECOVERY HEART RATE: 97 BPM
OHS CV CPX 85 PERCENT MAX PREDICTED HEART RATE MALE: 121
OHS CV CPX MAX PREDICTED HEART RATE: 143
OHS CV CPX PATIENT IS FEMALE: 1
OHS CV CPX PATIENT IS MALE: 0
OHS CV CPX PEAK DIASTOLIC BLOOD PRESSURE: 48 MMHG
OHS CV CPX PEAK HEAR RATE: 101 BPM
OHS CV CPX PEAK RATE PRESSURE PRODUCT: NORMAL
OHS CV CPX PEAK SYSTOLIC BLOOD PRESSURE: 158 MMHG
OHS CV CPX PERCENT MAX PREDICTED HEART RATE ACHIEVED: 71
OHS CV CPX RATE PRESSURE PRODUCT PRESENTING: NORMAL
SYSTOLIC BLOOD PRESSURE: 170 MMHG

## 2022-08-05 PROCEDURE — 93018 CV STRESS TEST I&R ONLY: CPT | Mod: ,,, | Performed by: INTERNAL MEDICINE

## 2022-08-05 PROCEDURE — 93017 CV STRESS TEST TRACING ONLY: CPT

## 2022-08-05 PROCEDURE — A9502 TC99M TETROFOSMIN: HCPCS

## 2022-08-05 PROCEDURE — 93016 CV STRESS TEST SUPVJ ONLY: CPT | Mod: ,,, | Performed by: INTERNAL MEDICINE

## 2022-08-05 PROCEDURE — 63600175 PHARM REV CODE 636 W HCPCS: Performed by: INTERNAL MEDICINE

## 2022-08-05 PROCEDURE — 93016 CV STRESS TEST SUPVJ ONLY: CPT | Mod: ,,,

## 2022-08-05 PROCEDURE — 93016 NUCLEAR STRESS TEST (CUPID ONLY): ICD-10-PCS | Mod: ,,, | Performed by: INTERNAL MEDICINE

## 2022-08-05 PROCEDURE — 93016 PR CARDIAC STRESS TST,DR SUPERV ONLY: ICD-10-PCS | Mod: ,,,

## 2022-08-05 PROCEDURE — 93018 NUCLEAR STRESS TEST (CUPID ONLY): ICD-10-PCS | Mod: ,,, | Performed by: INTERNAL MEDICINE

## 2022-08-05 RX ORDER — REGADENOSON 0.08 MG/ML
0.4 INJECTION, SOLUTION INTRAVENOUS
Status: COMPLETED | OUTPATIENT
Start: 2022-08-05 | End: 2022-08-05

## 2022-08-05 RX ADMIN — REGADENOSON 0.4 MG: 0.08 INJECTION, SOLUTION INTRAVENOUS at 09:08

## 2022-08-08 ENCOUNTER — TELEPHONE (OUTPATIENT)
Dept: SURGERY | Facility: CLINIC | Age: 72
End: 2022-08-08
Payer: MEDICARE

## 2022-08-08 NOTE — TELEPHONE ENCOUNTER
----- Message from Pan Campbell sent at 8/8/2022  9:36 AM CDT -----  Contact: self  Type:  Sooner Appointment Request    Caller is requesting a sooner appointment.  Caller declined first available appointment listed below.  Caller will not accept being placed on the waitlist and is requesting a message be sent to doctor.    Name of Caller:  Patient  When is the first available appointment?  N/a  Symptoms:  Surgery scheduling  Best Call Back Number:  [phone]   Additional Information:   Called to schedule procedure with office.

## 2022-08-09 ENCOUNTER — TELEPHONE (OUTPATIENT)
Dept: SURGERY | Facility: CLINIC | Age: 72
End: 2022-08-09
Payer: MEDICARE

## 2022-08-09 NOTE — TELEPHONE ENCOUNTER
Called patient to advise that we received her cardiac clearance from Dr. Zuniga.  Scheduled her surgery for Monday 8/29/22 (patient wanted a Monday so that her daughter could stay the entire week).  Her Preop appt is for Wednesday 8/24/22 for 11:00.  Patient will come by office to sign consent and  her prep instructions on 8/24/22 prior to her preadmit appt.

## 2022-08-11 ENCOUNTER — TELEPHONE (OUTPATIENT)
Dept: HEMATOLOGY/ONCOLOGY | Facility: CLINIC | Age: 72
End: 2022-08-11
Payer: MEDICARE

## 2022-08-11 NOTE — TELEPHONE ENCOUNTER
This nurse called pt to schedule f/u appt post sx. Pt states that after her sx she will be in [place] with her daughter. This nurse instructed pt to contact clinic if she has to r/s appts. She v/u.

## 2022-08-23 DIAGNOSIS — C18.2 MALIGNANT NEOPLASM OF ASCENDING COLON: Primary | ICD-10-CM

## 2022-08-23 RX ORDER — CEFOXITIN SODIUM 2 G/50ML
2 INJECTION, SOLUTION INTRAVENOUS
Status: DISCONTINUED | OUTPATIENT
Start: 2022-08-23 | End: 2022-08-23 | Stop reason: HOSPADM

## 2022-08-26 ENCOUNTER — HOSPITAL ENCOUNTER (OUTPATIENT)
Dept: RADIOLOGY | Facility: HOSPITAL | Age: 72
Discharge: HOME | DRG: 330 | End: 2022-08-26
Attending: SURGERY
Payer: MEDICARE

## 2022-08-26 ENCOUNTER — HOSPITAL ENCOUNTER (OUTPATIENT)
Dept: PREADMISSION TESTING | Facility: HOSPITAL | Age: 72
Discharge: HOME | DRG: 330 | End: 2022-08-26
Attending: SURGERY
Payer: MEDICARE

## 2022-08-26 VITALS
HEART RATE: 80 BPM | SYSTOLIC BLOOD PRESSURE: 193 MMHG | DIASTOLIC BLOOD PRESSURE: 64 MMHG | HEIGHT: 64 IN | BODY MASS INDEX: 22.23 KG/M2 | WEIGHT: 130.19 LBS | OXYGEN SATURATION: 98 % | TEMPERATURE: 98 F | RESPIRATION RATE: 16 BRPM

## 2022-08-26 DIAGNOSIS — C18.2 MALIGNANT NEOPLASM OF ASCENDING COLON: ICD-10-CM

## 2022-08-26 DIAGNOSIS — Z01.818 PREOP TESTING: Primary | ICD-10-CM

## 2022-08-26 LAB — SARS-COV-2 RDRP RESP QL NAA+PROBE: NEGATIVE

## 2022-08-26 PROCEDURE — U0002 COVID-19 LAB TEST NON-CDC: HCPCS | Performed by: SURGERY

## 2022-08-26 PROCEDURE — 71046 X-RAY EXAM CHEST 2 VIEWS: CPT | Mod: TC

## 2022-08-26 RX ORDER — LANOLIN ALCOHOL/MO/W.PET/CERES
1000 CREAM (GRAM) TOPICAL DAILY
COMMUNITY
End: 2024-03-22

## 2022-08-26 RX ORDER — LOSARTAN POTASSIUM AND HYDROCHLOROTHIAZIDE 12.5; 1 MG/1; MG/1
1 TABLET ORAL DAILY
Status: ON HOLD | COMMUNITY
End: 2022-09-02 | Stop reason: HOSPADM

## 2022-08-26 RX ORDER — PANTOPRAZOLE SODIUM 40 MG/1
40 TABLET, DELAYED RELEASE ORAL DAILY
COMMUNITY
End: 2024-03-22

## 2022-08-26 RX ORDER — CHOLECALCIFEROL (VITAMIN D3) 25 MCG
1000 TABLET ORAL DAILY
COMMUNITY
End: 2024-03-22

## 2022-08-26 NOTE — DISCHARGE INSTRUCTIONS
To confirm, Your doctor has instructed you that surgery is scheduled for:   Monday August 29, 2022  Pre-Op will call the afternoon prior to surgery between 4:00 and 6:00 PM with the final arrival time.  today    Please report to Registration at front of the hospital --it is best to park in the garage on [address]    Do not eat or drink anything after midnight the night before your surgery - THIS INCLUDES  WATER, GUM, MINTS AND CANDY.    YOU MAY BRUSH YOUR TEETH     TAKE APPROVED  MEDICATIONS WITH A SMALL SIP OF WATER THE MORNING OF YOUR PROCEDURE: See Medication list    PLEASE NOTE:  The surgery schedule has many variables which may affect the time of your surgery case.  Family members should be available if your surgery time changes.  Plan to be here the day of your procedure between 4-6 hours.    DO NOT TAKE THESE MEDICATIONS 5-7 DAYS PRIOR to your procedure or per your surgeon's request: ASPIRIN, ALEVE, ADVIL, IBUPROFEN,  RUMA SELTZER, BC , FISH OIL , VITAMIN E, HERBALS  (May take Tylenol)    ONLY if you are prescribed any types of blood thinners such as:  Aspirin, Coumadin, Plavix, Pradaxa, Xarelto, Aggrenox, Effient, Eliquis, Savasya, Brilinta, or any other, ask your surgeon whether you should stop taking them and how long before surgery you should stop.  You may also need to verify with the prescribing physician if it is ok to stop your medication.                                                     IMPORTANT INSTRUCTIONS    Do not smoke, vape or drink alcoholic beverages 24 hours prior to your procedure.  Shower the night before AND the morning of your procedure with a Chlorhexidine wash such as Hibiclens or Dial antibacterial soap from the neck down.    Do not get it on your face or in your eyes.  You may use your own shampoo and face wash. This helps your skin to be as bacteria free as possible.   Do not apply any deodorant, lotion or powder after you shower.   DO NOT remove hair from the surgery site.  Do  not shave the incision site unless you are given specific instructions to do so.    Sleep in a bed with clean sheets.  Do not sleep with a pet in the bed.   If you wear contact lenses, dentures, hearing aids or glasses, bring a container to put them in during surgery and give to a family member for safe keeping.    Please leave all jewelry, piercing's and valuables at home.   If your doctor has scheduled you for an overnight stay, bring a small overnight bag with any personal items you need.  Wear comfortable clothing that is easy to remove and place back on after surgery.     Make arrangements in advance for transportation home by a responsible adult.    You must make arrangements for transportation, TAXI'S, UBER'S OR LYFTS ARE NOT ALLOWED.      If you have any questions about these instructions, call Pre-Op Admit  Nursing at [phone] , Pre-Op Day Surgery Unit at [phone]

## 2022-08-26 NOTE — PRE-PROCEDURE INSTRUCTIONS
preadmit assessment complete. Review of health history and medication, questions answered.  Pt voiced understanding. Dr Mccloud at side, consent signed .

## 2022-08-29 ENCOUNTER — HOSPITAL ENCOUNTER (INPATIENT)
Facility: HOSPITAL | Age: 72
LOS: 4 days | Discharge: HOME | DRG: 330 | End: 2022-09-02
Attending: SURGERY | Admitting: SURGERY
Payer: MEDICARE

## 2022-08-29 ENCOUNTER — ANESTHESIA (OUTPATIENT)
Dept: SURGERY | Facility: HOSPITAL | Age: 72
DRG: 330 | End: 2022-08-29
Payer: MEDICARE

## 2022-08-29 ENCOUNTER — ANESTHESIA EVENT (OUTPATIENT)
Dept: SURGERY | Facility: HOSPITAL | Age: 72
DRG: 330 | End: 2022-08-29
Payer: MEDICARE

## 2022-08-29 DIAGNOSIS — Z90.49 S/P RIGHT COLECTOMY: ICD-10-CM

## 2022-08-29 DIAGNOSIS — E87.1 HYPONATREMIA: ICD-10-CM

## 2022-08-29 DIAGNOSIS — I16.0 HYPERTENSIVE URGENCY: ICD-10-CM

## 2022-08-29 DIAGNOSIS — C18.2 MALIGNANT NEOPLASM OF ASCENDING COLON: Primary | ICD-10-CM

## 2022-08-29 LAB
ABO + RH BLD: NORMAL
ANION GAP SERPL CALC-SCNC: 11 MMOL/L (ref 8–16)
BLD GP AB SCN CELLS X3 SERPL QL: NORMAL
BUN SERPL-MCNC: 10 MG/DL (ref 8–23)
CALCIUM SERPL-MCNC: 9 MG/DL (ref 8.7–10.5)
CHLORIDE SERPL-SCNC: 92 MMOL/L (ref 95–110)
CO2 SERPL-SCNC: 26 MMOL/L (ref 23–29)
CREAT SERPL-MCNC: 0.8 MG/DL (ref 0.5–1.4)
EST. GFR  (NO RACE VARIABLE): >60 ML/MIN/1.73 M^2
GLUCOSE SERPL-MCNC: 151 MG/DL (ref 70–110)
GLUCOSE SERPL-MCNC: 184 MG/DL (ref 70–110)
GLUCOSE SERPL-MCNC: 219 MG/DL (ref 70–110)
GLUCOSE SERPL-MCNC: 228 MG/DL (ref 70–110)
HCT VFR BLD AUTO: 29.6 % (ref 37–48.5)
HGB BLD-MCNC: 10.1 G/DL (ref 12–16)
IRON SERPL-MCNC: 26 UG/DL (ref 30–160)
POTASSIUM SERPL-SCNC: 3.9 MMOL/L (ref 3.5–5.1)
SATURATED IRON: 8 % (ref 20–50)
SODIUM SERPL-SCNC: 129 MMOL/L (ref 136–145)
TOTAL IRON BINDING CAPACITY: 318 UG/DL (ref 250–450)
TRANSFERRIN SERPL-MCNC: 227 MG/DL (ref 200–375)

## 2022-08-29 PROCEDURE — 25000003 PHARM REV CODE 250: Performed by: ANESTHESIOLOGY

## 2022-08-29 PROCEDURE — C9290 INJ, BUPIVACAINE LIPOSOME: HCPCS | Performed by: SURGERY

## 2022-08-29 PROCEDURE — 63600175 PHARM REV CODE 636 W HCPCS: Performed by: STUDENT IN AN ORGANIZED HEALTH CARE EDUCATION/TRAINING PROGRAM

## 2022-08-29 PROCEDURE — 81288 MLH1 GENE: CPT | Mod: TC

## 2022-08-29 PROCEDURE — 85014 HEMATOCRIT: CPT | Performed by: STUDENT IN AN ORGANIZED HEALTH CARE EDUCATION/TRAINING PROGRAM

## 2022-08-29 PROCEDURE — 37000009 HC ANESTHESIA EA ADD 15 MINS: Performed by: SURGERY

## 2022-08-29 PROCEDURE — 25000003 PHARM REV CODE 250: Performed by: SURGERY

## 2022-08-29 PROCEDURE — 94799 UNLISTED PULMONARY SVC/PX: CPT

## 2022-08-29 PROCEDURE — 99900035 HC TECH TIME PER 15 MIN (STAT)

## 2022-08-29 PROCEDURE — 63600175 PHARM REV CODE 636 W HCPCS: Performed by: SURGERY

## 2022-08-29 PROCEDURE — 27202105 HC BIS BILATERAL SENSOR: Performed by: ANESTHESIOLOGY

## 2022-08-29 PROCEDURE — 27201423 OPTIME MED/SURG SUP & DEVICES STERILE SUPPLY: Performed by: SURGERY

## 2022-08-29 PROCEDURE — 44205 PR LAP,SURG,COLECTOMY,W/REMVL TERM ILEUM: ICD-10-PCS | Mod: ,,, | Performed by: SURGERY

## 2022-08-29 PROCEDURE — 36415 COLL VENOUS BLD VENIPUNCTURE: CPT | Performed by: STUDENT IN AN ORGANIZED HEALTH CARE EDUCATION/TRAINING PROGRAM

## 2022-08-29 PROCEDURE — S4991 NICOTINE PATCH NONLEGEND: HCPCS | Performed by: SURGERY

## 2022-08-29 PROCEDURE — C1765 ADHESION BARRIER: HCPCS | Performed by: SURGERY

## 2022-08-29 PROCEDURE — 63600175 PHARM REV CODE 636 W HCPCS: Performed by: NURSE ANESTHETIST, CERTIFIED REGISTERED

## 2022-08-29 PROCEDURE — 25000003 PHARM REV CODE 250: Performed by: NURSE ANESTHETIST, CERTIFIED REGISTERED

## 2022-08-29 PROCEDURE — 27000653 HC CATH, IV CATHLN: Performed by: ANESTHESIOLOGY

## 2022-08-29 PROCEDURE — 83036 HEMOGLOBIN GLYCOSYLATED A1C: CPT | Performed by: STUDENT IN AN ORGANIZED HEALTH CARE EDUCATION/TRAINING PROGRAM

## 2022-08-29 PROCEDURE — 63600175 PHARM REV CODE 636 W HCPCS: Performed by: ANESTHESIOLOGY

## 2022-08-29 PROCEDURE — 71000033 HC RECOVERY, INTIAL HOUR: Performed by: SURGERY

## 2022-08-29 PROCEDURE — 80048 BASIC METABOLIC PNL TOTAL CA: CPT | Performed by: SURGERY

## 2022-08-29 PROCEDURE — 36000711: Performed by: SURGERY

## 2022-08-29 PROCEDURE — 27000671 HC TUBING MICROBORE EXT: Performed by: ANESTHESIOLOGY

## 2022-08-29 PROCEDURE — 25000003 PHARM REV CODE 250: Performed by: STUDENT IN AN ORGANIZED HEALTH CARE EDUCATION/TRAINING PROGRAM

## 2022-08-29 PROCEDURE — 44205 LAP COLECTOMY PART W/ILEUM: CPT | Mod: ,,, | Performed by: SURGERY

## 2022-08-29 PROCEDURE — 27201107 HC STYLET, STANDARD: Performed by: ANESTHESIOLOGY

## 2022-08-29 PROCEDURE — 37000008 HC ANESTHESIA 1ST 15 MINUTES: Performed by: SURGERY

## 2022-08-29 PROCEDURE — 86850 RBC ANTIBODY SCREEN: CPT | Performed by: SURGERY

## 2022-08-29 PROCEDURE — 94761 N-INVAS EAR/PLS OXIMETRY MLT: CPT

## 2022-08-29 PROCEDURE — 99900031 HC PATIENT EDUCATION (STAT)

## 2022-08-29 PROCEDURE — 71000039 HC RECOVERY, EACH ADD'L HOUR: Performed by: SURGERY

## 2022-08-29 PROCEDURE — 12000002 HC ACUTE/MED SURGE SEMI-PRIVATE ROOM

## 2022-08-29 PROCEDURE — 27000221 HC OXYGEN, UP TO 24 HOURS

## 2022-08-29 PROCEDURE — 84466 ASSAY OF TRANSFERRIN: CPT | Performed by: STUDENT IN AN ORGANIZED HEALTH CARE EDUCATION/TRAINING PROGRAM

## 2022-08-29 PROCEDURE — 27000673 HC TUBING BLOOD Y: Performed by: ANESTHESIOLOGY

## 2022-08-29 PROCEDURE — 88341 IMHCHEM/IMCYTCHM EA ADD ANTB: CPT | Mod: TC,59

## 2022-08-29 PROCEDURE — 36000710: Performed by: SURGERY

## 2022-08-29 PROCEDURE — 85018 HEMOGLOBIN: CPT | Performed by: STUDENT IN AN ORGANIZED HEALTH CARE EDUCATION/TRAINING PROGRAM

## 2022-08-29 PROCEDURE — 27000080 OPTIME MED/SURG SUP & DEVICES GENERAL CLASSIFICATION: Performed by: SURGERY

## 2022-08-29 DEVICE — BARRIER ADHESIVE SEPRAFILM 5X6: Type: IMPLANTABLE DEVICE | Site: ABDOMEN | Status: FUNCTIONAL

## 2022-08-29 RX ORDER — OXYCODONE AND ACETAMINOPHEN 5; 325 MG/1; MG/1
1 TABLET ORAL EVERY 4 HOURS PRN
Status: DISCONTINUED | OUTPATIENT
Start: 2022-08-29 | End: 2022-09-02 | Stop reason: HOSPADM

## 2022-08-29 RX ORDER — DIPHENHYDRAMINE HYDROCHLORIDE 50 MG/ML
12.5 INJECTION INTRAMUSCULAR; INTRAVENOUS
Status: DISCONTINUED | OUTPATIENT
Start: 2022-08-29 | End: 2022-08-29 | Stop reason: HOSPADM

## 2022-08-29 RX ORDER — ENOXAPARIN SODIUM 100 MG/ML
40 INJECTION SUBCUTANEOUS EVERY 24 HOURS
Status: DISCONTINUED | OUTPATIENT
Start: 2022-08-30 | End: 2022-09-02 | Stop reason: HOSPADM

## 2022-08-29 RX ORDER — SODIUM CHLORIDE, SODIUM LACTATE, POTASSIUM CHLORIDE, CALCIUM CHLORIDE 600; 310; 30; 20 MG/100ML; MG/100ML; MG/100ML; MG/100ML
INJECTION, SOLUTION INTRAVENOUS CONTINUOUS
Status: DISCONTINUED | OUTPATIENT
Start: 2022-08-29 | End: 2022-08-30

## 2022-08-29 RX ORDER — HYDRALAZINE HYDROCHLORIDE 20 MG/ML
10 INJECTION INTRAMUSCULAR; INTRAVENOUS ONCE
Status: COMPLETED | OUTPATIENT
Start: 2022-08-29 | End: 2022-08-29

## 2022-08-29 RX ORDER — MEPERIDINE HYDROCHLORIDE 50 MG/ML
12.5 INJECTION INTRAMUSCULAR; INTRAVENOUS; SUBCUTANEOUS EVERY 10 MIN PRN
Status: DISCONTINUED | OUTPATIENT
Start: 2022-08-29 | End: 2022-08-29 | Stop reason: HOSPADM

## 2022-08-29 RX ORDER — INSULIN ASPART 100 [IU]/ML
0-5 INJECTION, SOLUTION INTRAVENOUS; SUBCUTANEOUS EVERY 6 HOURS PRN
Status: DISCONTINUED | OUTPATIENT
Start: 2022-08-29 | End: 2022-09-02 | Stop reason: HOSPADM

## 2022-08-29 RX ORDER — LOSARTAN POTASSIUM AND HYDROCHLOROTHIAZIDE 12.5; 1 MG/1; MG/1
1 TABLET ORAL DAILY
Status: DISCONTINUED | OUTPATIENT
Start: 2022-08-29 | End: 2022-08-29

## 2022-08-29 RX ORDER — ONDANSETRON 2 MG/ML
INJECTION INTRAMUSCULAR; INTRAVENOUS
Status: DISCONTINUED | OUTPATIENT
Start: 2022-08-29 | End: 2022-08-29

## 2022-08-29 RX ORDER — OXYCODONE HYDROCHLORIDE 5 MG/1
5 TABLET ORAL
Status: DISCONTINUED | OUTPATIENT
Start: 2022-08-29 | End: 2022-08-29 | Stop reason: HOSPADM

## 2022-08-29 RX ORDER — LABETALOL 100 MG/1
200 TABLET, FILM COATED ORAL EVERY 12 HOURS
Status: DISCONTINUED | OUTPATIENT
Start: 2022-08-29 | End: 2022-08-31

## 2022-08-29 RX ORDER — HYDROCHLOROTHIAZIDE 12.5 MG/1
12.5 TABLET ORAL DAILY
Status: DISCONTINUED | OUTPATIENT
Start: 2022-08-29 | End: 2022-08-30

## 2022-08-29 RX ORDER — MUPIROCIN 20 MG/G
1 OINTMENT TOPICAL 2 TIMES DAILY
Status: DISCONTINUED | OUTPATIENT
Start: 2022-08-29 | End: 2022-09-02 | Stop reason: HOSPADM

## 2022-08-29 RX ORDER — PROPOFOL 10 MG/ML
VIAL (ML) INTRAVENOUS
Status: DISCONTINUED | OUTPATIENT
Start: 2022-08-29 | End: 2022-08-29

## 2022-08-29 RX ORDER — ACETAMINOPHEN 10 MG/ML
INJECTION, SOLUTION INTRAVENOUS
Status: DISCONTINUED | OUTPATIENT
Start: 2022-08-29 | End: 2022-08-29

## 2022-08-29 RX ORDER — GLUCAGON 1 MG
1 KIT INJECTION
Status: DISCONTINUED | OUTPATIENT
Start: 2022-08-29 | End: 2022-09-02 | Stop reason: HOSPADM

## 2022-08-29 RX ORDER — DOXAZOSIN 1 MG/1
2 TABLET ORAL NIGHTLY
Status: DISCONTINUED | OUTPATIENT
Start: 2022-08-29 | End: 2022-09-02 | Stop reason: HOSPADM

## 2022-08-29 RX ORDER — LOSARTAN POTASSIUM 50 MG/1
100 TABLET ORAL DAILY
Status: DISCONTINUED | OUTPATIENT
Start: 2022-08-29 | End: 2022-09-02 | Stop reason: HOSPADM

## 2022-08-29 RX ORDER — LABETALOL HYDROCHLORIDE 5 MG/ML
10 INJECTION, SOLUTION INTRAVENOUS EVERY 6 HOURS PRN
Status: DISCONTINUED | OUTPATIENT
Start: 2022-08-29 | End: 2022-09-02 | Stop reason: HOSPADM

## 2022-08-29 RX ORDER — HYDRALAZINE HYDROCHLORIDE 20 MG/ML
10 INJECTION INTRAMUSCULAR; INTRAVENOUS EVERY 6 HOURS PRN
Status: DISCONTINUED | OUTPATIENT
Start: 2022-08-29 | End: 2022-09-02 | Stop reason: HOSPADM

## 2022-08-29 RX ORDER — PANTOPRAZOLE SODIUM 40 MG/1
40 TABLET, DELAYED RELEASE ORAL DAILY
Status: DISCONTINUED | OUTPATIENT
Start: 2022-08-30 | End: 2022-09-02 | Stop reason: HOSPADM

## 2022-08-29 RX ORDER — SUCCINYLCHOLINE CHLORIDE 20 MG/ML
INJECTION INTRAMUSCULAR; INTRAVENOUS
Status: DISCONTINUED | OUTPATIENT
Start: 2022-08-29 | End: 2022-08-29

## 2022-08-29 RX ORDER — CEFOXITIN SODIUM 2 G/50ML
2 INJECTION, SOLUTION INTRAVENOUS
Status: COMPLETED | OUTPATIENT
Start: 2022-08-29 | End: 2022-08-31

## 2022-08-29 RX ORDER — DEXAMETHASONE SODIUM PHOSPHATE 4 MG/ML
INJECTION, SOLUTION INTRA-ARTICULAR; INTRALESIONAL; INTRAMUSCULAR; INTRAVENOUS; SOFT TISSUE
Status: DISCONTINUED | OUTPATIENT
Start: 2022-08-29 | End: 2022-08-29

## 2022-08-29 RX ORDER — LIDOCAINE HYDROCHLORIDE 10 MG/ML
INJECTION, SOLUTION EPIDURAL; INFILTRATION; INTRACAUDAL; PERINEURAL
Status: DISCONTINUED | OUTPATIENT
Start: 2022-08-29 | End: 2022-08-29

## 2022-08-29 RX ORDER — CEFOXITIN SODIUM 2 G/50ML
INJECTION, SOLUTION INTRAVENOUS
Status: DISCONTINUED | OUTPATIENT
Start: 2022-08-29 | End: 2022-08-29

## 2022-08-29 RX ORDER — ROCURONIUM BROMIDE 10 MG/ML
INJECTION, SOLUTION INTRAVENOUS
Status: DISCONTINUED | OUTPATIENT
Start: 2022-08-29 | End: 2022-08-29

## 2022-08-29 RX ORDER — SODIUM CHLORIDE, SODIUM LACTATE, POTASSIUM CHLORIDE, CALCIUM CHLORIDE 600; 310; 30; 20 MG/100ML; MG/100ML; MG/100ML; MG/100ML
INJECTION, SOLUTION INTRAVENOUS CONTINUOUS PRN
Status: DISCONTINUED | OUTPATIENT
Start: 2022-08-29 | End: 2022-08-29

## 2022-08-29 RX ORDER — SODIUM CHLORIDE 9 MG/ML
INJECTION, SOLUTION INTRAVENOUS CONTINUOUS
Status: DISCONTINUED | OUTPATIENT
Start: 2022-08-29 | End: 2022-08-29

## 2022-08-29 RX ORDER — MIDAZOLAM HYDROCHLORIDE 1 MG/ML
INJECTION INTRAMUSCULAR; INTRAVENOUS
Status: DISCONTINUED | OUTPATIENT
Start: 2022-08-29 | End: 2022-08-29

## 2022-08-29 RX ORDER — INDOCYANINE GREEN AND WATER 25 MG
KIT INJECTION
Status: DISCONTINUED | OUTPATIENT
Start: 2022-08-29 | End: 2022-08-29

## 2022-08-29 RX ORDER — ONDANSETRON 4 MG/1
8 TABLET, ORALLY DISINTEGRATING ORAL EVERY 6 HOURS PRN
Status: DISCONTINUED | OUTPATIENT
Start: 2022-08-29 | End: 2022-08-30

## 2022-08-29 RX ORDER — BUPIVACAINE HYDROCHLORIDE AND EPINEPHRINE 2.5; 5 MG/ML; UG/ML
INJECTION, SOLUTION EPIDURAL; INFILTRATION; INTRACAUDAL; PERINEURAL
Status: DISCONTINUED | OUTPATIENT
Start: 2022-08-29 | End: 2022-08-29 | Stop reason: HOSPADM

## 2022-08-29 RX ORDER — HYDROMORPHONE HYDROCHLORIDE 1 MG/ML
1 INJECTION, SOLUTION INTRAMUSCULAR; INTRAVENOUS; SUBCUTANEOUS EVERY 4 HOURS PRN
Status: DISCONTINUED | OUTPATIENT
Start: 2022-08-29 | End: 2022-08-29

## 2022-08-29 RX ORDER — FAMOTIDINE 10 MG/ML
INJECTION INTRAVENOUS
Status: DISCONTINUED | OUTPATIENT
Start: 2022-08-29 | End: 2022-08-29

## 2022-08-29 RX ORDER — FENTANYL CITRATE 50 UG/ML
INJECTION, SOLUTION INTRAMUSCULAR; INTRAVENOUS
Status: DISCONTINUED | OUTPATIENT
Start: 2022-08-29 | End: 2022-08-29

## 2022-08-29 RX ORDER — HYDROMORPHONE HYDROCHLORIDE 1 MG/ML
1 INJECTION, SOLUTION INTRAMUSCULAR; INTRAVENOUS; SUBCUTANEOUS
Status: DISCONTINUED | OUTPATIENT
Start: 2022-08-29 | End: 2022-08-31

## 2022-08-29 RX ORDER — ONDANSETRON 2 MG/ML
4 INJECTION INTRAMUSCULAR; INTRAVENOUS DAILY PRN
Status: DISCONTINUED | OUTPATIENT
Start: 2022-08-29 | End: 2022-08-29 | Stop reason: HOSPADM

## 2022-08-29 RX ORDER — NICOTINE 7MG/24HR
1 PATCH, TRANSDERMAL 24 HOURS TRANSDERMAL DAILY
Status: DISCONTINUED | OUTPATIENT
Start: 2022-08-29 | End: 2022-09-02 | Stop reason: HOSPADM

## 2022-08-29 RX ORDER — CLONAZEPAM 0.5 MG/1
0.5 TABLET ORAL 2 TIMES DAILY PRN
Status: DISCONTINUED | OUTPATIENT
Start: 2022-08-29 | End: 2022-09-02 | Stop reason: HOSPADM

## 2022-08-29 RX ORDER — LABETALOL 100 MG/1
200 TABLET, FILM COATED ORAL EVERY 12 HOURS
Status: DISCONTINUED | OUTPATIENT
Start: 2022-08-29 | End: 2022-08-29

## 2022-08-29 RX ORDER — SODIUM CHLORIDE 0.9 % (FLUSH) 0.9 %
10 SYRINGE (ML) INJECTION
Status: DISCONTINUED | OUTPATIENT
Start: 2022-08-29 | End: 2022-09-02 | Stop reason: HOSPADM

## 2022-08-29 RX ORDER — HYDROMORPHONE HYDROCHLORIDE 1 MG/ML
0.2 INJECTION, SOLUTION INTRAMUSCULAR; INTRAVENOUS; SUBCUTANEOUS EVERY 5 MIN PRN
Status: DISCONTINUED | OUTPATIENT
Start: 2022-08-29 | End: 2022-08-29 | Stop reason: HOSPADM

## 2022-08-29 RX ADMIN — HYDROMORPHONE HYDROCHLORIDE 1 MG: 1 INJECTION, SOLUTION INTRAMUSCULAR; INTRAVENOUS; SUBCUTANEOUS at 06:08

## 2022-08-29 RX ADMIN — ONDANSETRON 4 MG: 2 INJECTION INTRAMUSCULAR; INTRAVENOUS at 11:08

## 2022-08-29 RX ADMIN — Medication 7.5 MG: at 09:08

## 2022-08-29 RX ADMIN — HYDROMORPHONE HYDROCHLORIDE 0.2 MG: 1 INJECTION, SOLUTION INTRAMUSCULAR; INTRAVENOUS; SUBCUTANEOUS at 11:08

## 2022-08-29 RX ADMIN — FENTANYL CITRATE 100 MCG: 50 INJECTION INTRAMUSCULAR; INTRAVENOUS at 07:08

## 2022-08-29 RX ADMIN — ONDANSETRON 4 MG: 2 INJECTION INTRAMUSCULAR; INTRAVENOUS at 07:08

## 2022-08-29 RX ADMIN — CEFOXITIN SODIUM 2 G: 2 INJECTION, SOLUTION INTRAVENOUS at 01:08

## 2022-08-29 RX ADMIN — NICOTINE 1 PATCH: 7 PATCH, EXTENDED RELEASE TRANSDERMAL at 05:08

## 2022-08-29 RX ADMIN — OXYCODONE AND ACETAMINOPHEN 1 TABLET: 5; 325 TABLET ORAL at 09:08

## 2022-08-29 RX ADMIN — SODIUM CHLORIDE, SODIUM LACTATE, POTASSIUM CHLORIDE, AND CALCIUM CHLORIDE: .6; .31; .03; .02 INJECTION, SOLUTION INTRAVENOUS at 07:08

## 2022-08-29 RX ADMIN — INSULIN HUMAN 4 UNITS: 100 INJECTION, SOLUTION PARENTERAL at 10:08

## 2022-08-29 RX ADMIN — HYDROMORPHONE HYDROCHLORIDE 1 MG: 1 INJECTION, SOLUTION INTRAMUSCULAR; INTRAVENOUS; SUBCUTANEOUS at 02:08

## 2022-08-29 RX ADMIN — DEXAMETHASONE SODIUM PHOSPHATE 4 MG: 4 INJECTION, SOLUTION INTRAMUSCULAR; INTRAVENOUS at 07:08

## 2022-08-29 RX ADMIN — SUGAMMADEX 200 MG: 100 INJECTION, SOLUTION INTRAVENOUS at 10:08

## 2022-08-29 RX ADMIN — LABETALOL HYDROCHLORIDE 200 MG: 100 TABLET, FILM COATED ORAL at 05:08

## 2022-08-29 RX ADMIN — PROPOFOL 80 MG: 10 INJECTION, EMULSION INTRAVENOUS at 08:08

## 2022-08-29 RX ADMIN — LOSARTAN POTASSIUM 100 MG: 50 TABLET, FILM COATED ORAL at 04:08

## 2022-08-29 RX ADMIN — PROMETHAZINE HYDROCHLORIDE 6.25 MG: 25 INJECTION INTRAMUSCULAR; INTRAVENOUS at 11:08

## 2022-08-29 RX ADMIN — MIDAZOLAM HYDROCHLORIDE 1 MG: 1 INJECTION, SOLUTION INTRAMUSCULAR; INTRAVENOUS at 07:08

## 2022-08-29 RX ADMIN — PROPOFOL 120 MG: 10 INJECTION, EMULSION INTRAVENOUS at 07:08

## 2022-08-29 RX ADMIN — SODIUM CHLORIDE: 0.9 INJECTION, SOLUTION INTRAVENOUS at 05:08

## 2022-08-29 RX ADMIN — SODIUM CHLORIDE 125 MG: 9 INJECTION, SOLUTION INTRAVENOUS at 05:08

## 2022-08-29 RX ADMIN — LIDOCAINE HYDROCHLORIDE 80 MG: 10 INJECTION, SOLUTION EPIDURAL; INFILTRATION; INTRACAUDAL; PERINEURAL at 07:08

## 2022-08-29 RX ADMIN — FAMOTIDINE 20 MG: 10 INJECTION, SOLUTION INTRAVENOUS at 07:08

## 2022-08-29 RX ADMIN — DOXAZOSIN 2 MG: 1 TABLET ORAL at 09:08

## 2022-08-29 RX ADMIN — CEFOXITIN SODIUM 2 G: 2 INJECTION, SOLUTION INTRAVENOUS at 07:08

## 2022-08-29 RX ADMIN — ACETAMINOPHEN 1000 MG: 10 INJECTION, SOLUTION INTRAVENOUS at 07:08

## 2022-08-29 RX ADMIN — FENTANYL CITRATE 50 MCG: 50 INJECTION INTRAMUSCULAR; INTRAVENOUS at 09:08

## 2022-08-29 RX ADMIN — ROCURONIUM BROMIDE 5 MG: 10 INJECTION, SOLUTION INTRAVENOUS at 07:08

## 2022-08-29 RX ADMIN — HYDRALAZINE HYDROCHLORIDE 10 MG: 20 INJECTION INTRAMUSCULAR; INTRAVENOUS at 11:08

## 2022-08-29 RX ADMIN — SUCCINYLCHOLINE CHLORIDE 160 MG: 20 INJECTION, SOLUTION INTRAMUSCULAR; INTRAVENOUS at 07:08

## 2022-08-29 RX ADMIN — MUPIROCIN 1 G: 20 OINTMENT TOPICAL at 09:08

## 2022-08-29 RX ADMIN — ROCURONIUM BROMIDE 10 MG: 10 INJECTION, SOLUTION INTRAVENOUS at 08:08

## 2022-08-29 RX ADMIN — ROCURONIUM BROMIDE 35 MG: 10 INJECTION, SOLUTION INTRAVENOUS at 07:08

## 2022-08-29 RX ADMIN — HYDROCHLOROTHIAZIDE 12.5 MG: 12.5 TABLET ORAL at 04:08

## 2022-08-29 NOTE — PLAN OF CARE
08/29/22 1351   Patient Assessment/Suction   Level of Consciousness (AVPU) alert   Respiratory Effort Normal   Expansion/Accessory Muscles/Retractions no use of accessory muscles   All Lung Fields Breath Sounds coarse   Rhythm/Pattern, Respiratory unlabored;pattern regular;depth regular   Cough Frequency infrequent   Cough Type nonproductive   PRE-TX-O2   O2 Device (Oxygen Therapy) nasal cannula   $ Is the patient on Low Flow Oxygen? Yes   Flow (L/min) 2   SpO2 100 %   Pulse Oximetry Type Intermittent   $ Pulse Oximetry - Multiple Charge Pulse Oximetry - Multiple   Oximetry Probe Site Applied   Pulse 72   Resp 18   Incentive Spirometer   $ Incentive Spirometer Charges postop instruction   Incentive Spirometer Predicted Level (mL) 1800   Administration (IS) instruction provided, initial   Number of Repetitions (IS) 10   Level Incentive Spirometer (mL) 500   Patient Tolerance (IS) good   Education   $ Education DME Oxygen;15 min   Respiratory Evaluation   $ Care Plan Tech Time 15 min   $ Eval/Re-eval Charges Evaluation   Evaluation For New Orders

## 2022-08-29 NOTE — ANESTHESIA PROCEDURE NOTES
Intubation    Date/Time: 8/29/2022 7:32 AM  Performed by: Omar Reid CRNA  Authorized by: Jesus Robins MD     Intubation:     Induction:  Intravenous    Intubated:  Postinduction    Attempts:  1    Attempted By:  CRNA    Method of Intubation:  Direct    Blade:  Laguna 2    Laryngeal View Grade: Grade I - full view of cords      Difficult Airway Encountered?: No      Complications:  None    Airway Device:  Oral endotracheal tube    Airway Device Size:  7.5    Style/Cuff Inflation:  Cuffed    Inflation Amount (mL):  5    Tube secured:  22    Secured at:  The lips    Placement Verified By:  Capnometry    Complicating Factors:  None    Findings Post-Intubation:  BS equal bilateral and atraumatic/condition of teeth unchanged

## 2022-08-29 NOTE — NURSING
Received patient via stretcher from [facility] today at 1300, position for comfort, IVF's started, mid ABD with silver drsg covered with transparent film. No drainage noted at this time, small incision noted to RLQ/LUQ/LLQ with dermabond noted. Santillan patent to  bag with clear yellow urine noted. Can express needs but very drowsy.

## 2022-08-29 NOTE — PLAN OF CARE
Critical access hospital  Initial Discharge Assessment       Primary Care Provider: Janeth Quezada NP    Admission Diagnosis: Malignant neoplasm of ascending colon [C18.2]    Admission Date: 8/29/2022  Expected Discharge Date: TBD    Patient lives with adult Daughter / Ella Harrison who is a nurse [phone]. Prior to admission, patient drove herself, completes all activities of daily living and does not require any adaptive equipment. Patient and family to schedule follow up appointment at discharge. Has existing appointment scheduled with oncology. No needs anticipated.          Payor: MEDICARE / Plan: MEDICARE PART A & B / Product Type: Government /     Extended Emergency Contact Information  Primary Emergency Contact: LuísKatty  Address: [address] [place] [address], [place] [address] [facility]  Mobile Phone: [phone]  Relation: Daughter  Preferred language: English   needed? No  Secondary Emergency Contact: Ella Harrison  Mobile Phone: [phone]  Relation: Daughter    Discharge Plan A: Home with family  Discharge Plan B: Home with family      [facility] [place] - [address]  [address]  [place] [address]  Phone: [phone] Fax: [phone]    [facility] [place] - [address]  [address]  [place] [address]  Phone: [phone] Fax: [phone]    [facility] - [place] - [address]  [address] [address]  Phone: [phone] Fax: [phone]      Initial Assessment (most recent)       Adult Discharge Assessment - 08/29/22 1616          Discharge Assessment    Assessment Type Discharge Planning Assessment     Confirmed/corrected address, phone number and insurance Yes     Confirmed Demographics Correct on Facesheet     Source of Information patient;family     Communicated QUINTEN with patient/caregiver Date not available/Unable to determine     Reason For Admission Malignant  neoplasm of ascending colon     Lives With child(shanna), adult     Facility Arrived From: home     Do you expect to return to your current living situation? Yes     Do you have help at home or someone to help you manage your care at home? Yes     Who are your caregiver(s) and their phone number(s)? Daughter / Ella Harrison who is a nurse [phone]     Prior to hospitilization cognitive status: Alert/Oriented     Current cognitive status: Alert/Oriented     Walking or Climbing Stairs Difficulty none     Dressing/Bathing Difficulty none     Equipment Currently Used at Home none     Readmission within 30 days? No     Patient currently being followed by outpatient case management? No     Do you currently have service(s) that help you manage your care at home? No     Do you take prescription medications? Yes     Do you have prescription coverage? Yes     Do you have any problems affording any of your prescribed medications? No     Is the patient taking medications as prescribed? yes     Who is going to help you get home at discharge? Daughter / Ella Harrison who is a nurse [phone]     How do you get to doctors appointments? car, drives self     Are you on dialysis? No     Do you take coumadin? No     Discharge Plan A Home with family     Discharge Plan B Home with family     DME Needed Upon Discharge  none     Discharge Plan discussed with: Patient;Adult children        Relationship/Environment    Name(s) of Who Lives With Patient Daughter / Ella Harrison who is a nurse [phone]

## 2022-08-29 NOTE — OP NOTE
Surgery Date: 8/29/2022      Surgeon(s) and Role:     * Pranav Mccloud III, MD - Primary     Assist - Marielle Trevizo.      Pre-op Diagnosis:  Malignant neoplasm of ascending colon [C18.2]     Post-op Diagnosis:  Post-Op Diagnosis Codes:     * Malignant neoplasm of ascending colon [C18.2]  Nodular liver      Procedure(s) (LRB):  LAPAROSCOPY, HAND- ASSISTED (N/A)  COLECTOMY, RIGHT     Anesthesia: General     Operative Findings: The patient was brought to the operating room and transferred to the operating room table in the supine position. SHe was given general anesthesia and intubated. A Santillan catheter was placed. The abdomen was prepped and draped. A small LUQ incision was made. Dissection was carried down to the abdominal fascia. The fascia was grasped and Veress needle placed through the abdominal wall. The abdomen was insufflated.  The abdomen was entered with a 5 millimeter Visiport.  5 mm port was placed in the suprapubic position in the midline and another 5 mm port was placed in the left lower abdomen. The patient was tilted to the left and put in a slight Trendelenburg position.  The abdomen was inspected. There were no liver lesions appreciated but it was nodular.  There were no peritoneal lesions appreciated. We turned our attention to the cecum and terminal ileum. The mass was easily visualized just distal to the cecum. The appendix was normal.  The cecum and ascending colon were mobilized from its retroperitoneal attachment along the white line of Toldt toward the hepatic flexure.  This was done using the [place] Ligasure and an Endo Dundee to retract the colon medially. The terminal ileum was also mobilized. Once good mobilization was performed of the ileum and right colon, A limited midline incision was made in the epigastrium.   The fascial opening was extended to the full-length of the incision. Hand port was placed into the incision closed. The abdomen was insufflated once again. Under  direct visualization hepatic flexure was taken down. The mobilized ileum, cecum and right colon was then eviscerated through the incision and wound protector. A PAPO 75 stapler was used to divide the intestine at the terminal ileum and again at the proximal transverse colon. The mesentery was taken down using the ligasure in combination with Clarissa clamps and silk suture to divide the major vessels and to include the mesenteric lymph nodes.  The specimen was removed.  A side to side ileo-colonic anastomosis was performed using the PAPO 75 and closed with the TX 60.  The mesenteric defect was closed using a running silk suture. Sheet of AmnioFix was placed over the anastomotic line. The anastomosis and intestines were placed back into the abdomen. HandPort was closed. Abdomen was insufflated once again. The omentum was brought back down over the intestines. HandPort and 5 mm ports were removed. The abdominal wall was closed using a running PDS suture. Skin was closed with 4-0 Monocryl. Santillan remained in place. Patient was extubated. She was taken to the recovery room in stable condition. All instrument counts were correct.      Estimated Blood Loss: 20 mLEstimated Blood Loss has been documented.  Complications none   Instrument counts correct          Specimens:   Specimen (24h ago, onward)          Start     Ordered     08/29/22 0912   Specimen to Pathology - Surgery  Once        Comments: Pre-op Diagnosis: Malignant neoplasm of ascending colon [C18.2]Procedure(s):COLECTOMY, PARTIAL, LAPAROSCOPIC Number of specimens: 1Name of specimens: RIGHT COLON,TERMINAL ILEUM FOR CANCER      Question:  Release to patient  Answer:  Immediate    08/29/22 0912                          [id number]

## 2022-08-29 NOTE — PROGRESS NOTES
Pt calm , with eyes closed, snoring sounds noted. No signs of nonverbal cues of pain at this time.   Oyxgen flowing via n/c at 2 lpm  - 98%, VSS

## 2022-08-29 NOTE — ANESTHESIA POSTPROCEDURE EVALUATION
Anesthesia Post Evaluation    Patient: Pebbles Boyle    Procedure(s) Performed: Procedure(s) (LRB):  LAPAROSCOPY, HAND- ASSISTED (N/A)  COLECTOMY, RIGHT, LAPAROSCOPIC    Final Anesthesia Type: general      Patient location during evaluation: PACU  Patient participation: Yes- Able to Participate  Level of consciousness: awake and alert  Post-procedure vital signs: reviewed and stable  Pain management: adequate  Airway patency: patent    PONV status at discharge: No PONV  Anesthetic complications: no      Cardiovascular status: stable  Respiratory status: unassisted and spontaneous ventilation  Hydration status: euvolemic  Follow-up not needed.          Vitals Value Taken Time   /64 08/29/22 1251   Temp 36.6 °C (97.8 °F) 08/29/22 1251   Pulse 72 08/29/22 1351   Resp 20 08/29/22 1430   SpO2 100 % 08/29/22 1351         Event Time   Out of Recovery 12:26:19         Pain/Yifan Score: Pain Rating Prior to Med Admin: 10 (8/29/2022  2:30 PM)  Yifan Score: 9 (8/29/2022 12:15 PM)

## 2022-08-29 NOTE — TRANSFER OF CARE
Anesthesia Transfer of Care Note    Patient: Pebbles Boyle    Procedure(s) Performed: Procedure(s) (LRB):  LAPAROSCOPIC HAND-ASSISTED RIGHT COLECTOMY (N/A)    Patient location: PACU    Anesthesia Type: general    Transport from OR: Transported from OR on 2-3 L/min O2 by NC with adequate spontaneous ventilation    Post pain: adequate analgesia    Post assessment: no apparent anesthetic complications    Post vital signs: stable    Level of consciousness: awake    Nausea/Vomiting: no nausea/vomiting    Complications: none    Transfer of care protocol was followed      Last vitals:   Visit Vitals  BP (!) 186/68 (BP Location: Left arm, Patient Position: Lying)   Pulse 78   Temp 37 °C (98.6 °F) (Oral)   Resp 16   LMP  (LMP Unknown)   SpO2 98%   Breastfeeding No

## 2022-08-29 NOTE — BRIEF OP NOTE
Formerly [facility]  Brief Operative Note    SUMMARY     Surgery Date: 8/29/2022     Surgeon(s) and Role:     * Pranav Mccloud III, MD - Primary    Assist - Marielle Trevizo.     Pre-op Diagnosis:  Malignant neoplasm of ascending colon [C18.2]    Post-op Diagnosis:  Post-Op Diagnosis Codes:     * Malignant neoplasm of ascending colon [C18.2]  Nodular liver     Procedure(s) (LRB):  LAPAROSCOPY, HAND- ASSISTED (N/A)  COLECTOMY, RIGHT    Anesthesia: General    Operative Findings: The patient was brought to the operating room and transferred to the operating room table in the supine position. SHe was given general anesthesia and intubated. A Santillan catheter was placed. The abdomen was prepped and draped. A small LUQ incision was made. Dissection was carried down to the abdominal fascia. The fascia was grasped and Veress needle placed through the abdominal wall. The abdomen was insufflated.  The abdomen was entered with a 5 millimeter Visiport.  5 mm port was placed in the suprapubic position in the midline and another 5 mm port was placed in the left lower abdomen. The patient was tilted to the left and put in a slight Trendelenburg position.  The abdomen was inspected. There were no liver lesions appreciated but it was nodular.  There were no peritoneal lesions appreciated. We turned our attention to the cecum and terminal ileum. The mass was easily visualized just distal to the cecum. The appendix was normal.  The cecum and ascending colon were mobilized from its retroperitoneal attachment along the white line of Toldt toward the hepatic flexure.  This was done using the [place] Ligasure and an Endo Jennifer to retract the colon medially. The terminal ileum was also mobilized. Once good mobilization was performed of the ileum and right colon, A limited midline incision was made in the epigastrium.   The fascial opening was extended to the full-length of the incision. Hand port was placed into the incision  closed. The abdomen was insufflated once again. Under direct visualization hepatic flexure was taken down. The mobilized ileum, cecum and right colon was then eviscerated through the incision and wound protector. A PAPO 75 stapler was used to divide the intestine at the terminal ileum and again at the proximal transverse colon. The mesentery was taken down using the ligasure in combination with Clarissa clamps and silk suture to divide the major vessels and to include the mesenteric lymph nodes.  The specimen was removed.  A side to side ileo-colonic anastomosis was performed using the PAPO 75 and closed with the TX 60.  The mesenteric defect was closed using a running silk suture. Sheet of AmnioFix was placed over the anastomotic line. The anastomosis and intestines were placed back into the abdomen. HandPort was closed. Abdomen was insufflated once again. The omentum was brought back down over the intestines. HandPort and 5 mm ports were removed. The abdominal wall was closed using a running PDS suture. Skin was closed with 4-0 Monocryl. Santillan remained in place. Patient was extubated. She was taken to the recovery room in stable condition. All instrument counts were correct.     Estimated Blood Loss: 20 mLEstimated Blood Loss has been documented.  Complications none   Instrument counts correct          Specimens:   Specimen (24h ago, onward)       Start     Ordered    08/29/22 0912  Specimen to Pathology - Surgery  Once        Comments: Pre-op Diagnosis: Malignant neoplasm of ascending colon [C18.2]Procedure(s):COLECTOMY, PARTIAL, LAPAROSCOPIC Number of specimens: 1Name of specimens: RIGHT COLON,TERMINAL ILEUM FOR CANCER     Question:  Release to patient  Answer:  Immediate    08/29/22 0902                    [id number]

## 2022-08-29 NOTE — CONSULTS
Formerly [facility]    Progress Note    Patient Name: Pebbles Boyle  MRN: [id number]  Patient Class: IP- Inpatient   Admission Date: 8/29/2022  5:33 AM  Length of Stay: 0  Attending Physician: Annabel Wiley MD  Primary Care Provider: Janeth Quezada NP  Face-to-Face encounter date: 08/29/2022      Subjective:    Chief Complaint:  Status post right hemicolectomy    Principal problem:  Status post right hemicolectomy    HPI:  71-year-old  female with history of diet-controlled diabetes and uncontrolled hypertension just had right hemicolectomy on account of malignant neoplasm of ascending colon.  Hospital Medicine consulted for medical management.  Patient is currently status post surgery and is currently sleeping and her 2 daughters are by bedside given history.  They state that patient has had uncontrolled hypertension despite multiple many medications adjustment done by her cardiologist Dr Zuniga.  She denies any headaches, blurring of vision, weakness of extremities, or chest pain.  Her diabetes is mainly controlled with oral metformin and diet.  Patient had been noticing blood in her stool and she is found to have colon cancer hence the surgery.  She smokes 6 cigarettes daily and she states that she is currently weaning off.  She does not drink alcohol or use any other illicit medication.    Interval History:   8/29: Patient is currently doing well postop and pain is controlled.  Blood pressure uncontrolled, will place patient on p.r.n. hydralazine, stop daily Lopressor and start patient on labetalol 200 mg b.i.d. resume losartan HCTZ and terazosin substituted for doxazosin.  Check hemoglobin and iron levels. Family present at bedside.No concerns/issues overnight reported by the patient or the nursing staff.      PAST MEDICAL HISTORY:     Past Medical History:   Diagnosis Date    Arthritis     Diabetes mellitus     DVT complicating pregnancy     1970?    Endometrial  cancer     Full dentures     Hypertension     Macular degeneration     Osteoporosis     PVC (premature ventricular contraction)     Squamous cell carcinoma of skin        PAST SURGICAL HISTORY:     Past Surgical History:   Procedure Laterality Date    Bilateral Tubal ligation      CARDIAC CATHETERIZATION  2014    Dr. womack   -    negative    EYE SURGERY  1987    RK    GALLBLADDER SURGERY      HAND ASSISTED LAPAROSCOPY N/A 8/29/2022    Procedure: LAPAROSCOPY, HAND- ASSISTED;  Surgeon: Pranav Mccloud III, MD;  Location: [facility] OR;  Service: General;  Laterality: N/A;    LAPAROSCOPIC RIGHT COLON RESECTION  8/29/2022    Procedure: COLECTOMY, RIGHT, LAPAROSCOPIC;  Surgeon: Pranav Mccloud III, MD;  Location: [facility] OR;  Service: General;;    LITHOTRIPSY      PARTIAL NEPHRECTOMY Right 2016    TONSILLECTOMY         ALLERGIES:   Tramadol, Codeine, and Hydrocodone    FAMILY HISTORY:     Family History   Problem Relation Age of Onset    Breast cancer Sister     Melanoma Neg Hx     Psoriasis Neg Hx     Lupus Neg Hx     Eczema Neg Hx      Reviewed and non- contributory   SOCIAL HISTORY:     Social History     Tobacco Use    Smoking status: Every Day     Packs/day: 0.25     Years: 59.00     Pack years: 14.75     Types: Cigarettes    Smokeless tobacco: Never   Substance Use Topics    Alcohol use: Not Currently     Comment: rarely        Social History     Substance and Sexual Activity   Sexual Activity Not on file        HOME MEDICATIONS:     Prior to Admission medications    Medication Sig Start Date End Date Taking? Authorizing Provider   ANTIOX #8/OM3/DHA/EPA/LUT/ZEAX (PRESERVISION AREDS 2, OMEGA-3, ORAL) Take 1 capsule by mouth 2 (two) times a day.   Yes Historical Provider   cloNIDine (CATAPRES) 0.1 MG tablet Take 1 tablet (0.1 mg total) by mouth 2 (two) times daily. Hold if heart rate < 60/min and SBP < 100 mmHg.  Patient taking differently: Take 0.1 mg by mouth 2 (two) times daily as needed. Hold if heart rate <  "60/min and SBP < 100 mmHg. 8/12/16 8/29/22 Yes Leyda Montoya MD   cyanocobalamin (VITAMIN B-12) 1000 MCG tablet Take 1,000 mcg by mouth once daily.   Yes Historical Provider   FEROSUL 325 mg (65 mg iron) Tab tablet Take by mouth every other day. 6/16/22  Yes Historical Provider   losartan-hydrochlorothiazide 100-12.5 mg (HYZAAR) 100-12.5 mg Tab Take 1 tablet by mouth once daily.   Yes Historical Provider   metoprolol tartrate (LOPRESSOR) 100 MG tablet Take 100 mg by mouth once daily.   Yes Historical Provider   pantoprazole (PROTONIX) 40 MG tablet Take 40 mg by mouth once daily.   Yes Historical Provider   terazosin (HYTRIN) 1 MG capsule Take 2 mg by mouth every evening.   Yes Historical Provider   vitamin D (VITAMIN D3) 1000 units Tab Take 1,000 Units by mouth once daily.   Yes Historical Provider   vitamins  A,C,E-zinc-copper 14,320-226-200 unit-mg-unit Cap Take 1 capsule by mouth as needed.   Yes Historical Provider   albuterol (PROVENTIL/VENTOLIN HFA) 90 mcg/actuation inhaler inhale 2 puff by inhalation route  every 4 - 6 hours as needed as needed 3/16/18   Historical Provider   clonazePAM (KLONOPIN) 0.5 MG tablet Take 0.5 mg by mouth 2 (two) times daily as needed for Anxiety.    Historical Provider   cyclobenzaprine (FLEXERIL) 10 MG tablet Take 10 mg by mouth 3 (three) times daily as needed for Muscle spasms.    Historical Provider       REVIEW OF SYSTEMS:   10 Point Review of System was performed and was found to be negative except for that mentioned already in the HPI above.     PHYSICAL EXAM:   BP (!) 202/90   Pulse 72   Temp 97.8 °F (36.6 °C) (Oral)   Resp 20   Ht 5' 4" (1.626 m)   Wt 60.5 kg (133 lb 6.1 oz)   LMP  (LMP Unknown)   SpO2 100%   Breastfeeding No   BMI 22.89 kg/m²     Vitals Reviewed  General appearance: Well-developed, well-nourished female in no apparent distress.  HENT: Atraumatic head. Moist mucous membranes of oral cavity.  Eyes: Normal extraocular movements.   Neck: Supple. "   Lungs: Clear to auscultation bilaterally. No wheezing present.   Heart: Regular rate and rhythm. S1 and S2 present with no murmurs/gallop/rub. No pedal edema. No JVD present.   Abdomen: Soft, non-distended, non-tender. No rebound tenderness/guarding. Bowel sounds are normal.   Extremities: No cyanosis, clubbing, or edema.  Skin: No Rash.   Neuro: Motor and sensory exams grossly intact. Good tone. Muscle strength 5/5 in all 4 extremities  Psych/mental status: Appropriate mood and affect. Responds appropriately to questions.       Following labs were Reviewed   CBC:  Recent Labs   Lab 08/29/22  1405   HGB 10.1*   HCT 29.6*     CMP:  Recent Labs   Lab 08/29/22  0610   CALCIUM 9.0   *   K 3.9   CO2 26   CL 92*   BUN 10   CREATININE 0.8       Micro Results  Microbiology Results (last 7 days)       ** No results found for the last 168 hours. **             Radiology Reports  X-Ray Chest PA And Lateral    Result Date: 8/26/2022  Reason: Malignant neoplasm of ascending colon; Pre-op cxr; Surgery, Monday, 08/29/2022; HTN - Controlled; Lung - Controlled (uses inhaler when needed); Heart - Negative FINDINGS: PA and lateral chest without comparisons show normal cardiomediastinal silhouette. Minor left apical pleural parenchymal scarring is evident. Lungs are otherwise clear. Pulmonary vasculature is normal. Degenerative changes affect the spine. Upper abdominal surgical clips occur in right upper quadrant and near midline. IMPRESSION: No acute cardiopulmonary abnormality. Electronically signed by:  Zander Jimenez MD  8/26/2022 3:44 PM CDT Workstation: [id number]    Nuclear Stress Test    Result Date: 8/5/2022    The EKG portion of this study is negative for ischemia.   The patient reported no chest pain during the stress test.   There were no arrhythmias during stress.   The nuclear portion of this study will be reported separately.     NM Myocardial Perfusion Spect Multi Pharmacologic    Result Date:  8/5/2022  Myocardial Perfusion Imaging with SPECT Gated acquisition and Ejection Fraction single day protocol CLINICAL INFORMATION: Arrhythmia. PROCEDURE: Lexiscan is utilized as a pharmacologic stress agent. At peak stress, 25.3 millicuries of technetium Myoview were administered intravenously.  The rest portion of the study is accomplished on the same day, utilizing 9.8 millicuries of technetium Myoview intravenously. FINDINGS: There is mildly diminished tracer accumulation within the anterior and inferior wall, more pronounced on the rest portion of the examination. The distribution of activity appears otherwise unremarkable. The chamber size is within normal limits.  There are no wall motion abnormalities and the estimated ejection fraction is 64%. IMPRESSION: 1.  NO FINDINGS OF PHARMACOLOGICALLY INDUCED REVERSIBILITY. 2.  ESTIMATED EJECTION FRACTION OF 64%. Electronically signed by:  Tommie Valdez MD  8/5/2022 11:12 AM CDT Workstation: [id number]       Meds  Scheduled Meds:   ceFOXItin (MEFOXIN) IVPB  2 g Intravenous Q6H    [START ON 8/30/2022] enoxaparin  40 mg Subcutaneous Daily    ferric gluconate (FERRLECIT) IVPB  125 mg Intravenous 1 time in Clinic/HOD    hydroCHLOROthiazide  12.5 mg Oral Daily    labetaloL  200 mg Oral Q12H    losartan  100 mg Oral Daily    mupirocin  1 g Nasal BID    nicotine  1 patch Transdermal Daily    [START ON 8/30/2022] pantoprazole  40 mg Oral Daily     Continuous Infusions:   sodium chloride 0.9%       PRN Meds:.clonazePAM, dextrose 10%, dextrose 10%, dextrose 50%, glucagon (human recombinant), hydrALAZINE, HYDROmorphone, insulin aspart U-100, labetalol, ondansetron, sodium chloride 0.9%.    Assessment & Plan:     Active Hospital Problems    Diagnosis    *Malignant neoplasm of ascending colon   Check hemoglobin  Check iron levels  Start patient on IV fluid  Resume medications  Control pain      Thank you for your consult. I will follow-up with patient. Please contact us if you  have any additional questions  Annabel Wiley MD  Department of Hospital Medicine   [facility]

## 2022-08-29 NOTE — ANESTHESIA PREPROCEDURE EVALUATION
08/29/2022  Pebbles Boyle is a 71 y.o., female.      Pre-op Assessment    I have reviewed the Patient Summary Reports.     I have reviewed the Nursing Notes. I have reviewed the NPO Status.   I have reviewed the Medications.     Review of Systems  Anesthesia Hx:  Denies Family Hx of Anesthesia complications.   Denies Personal Hx of Anesthesia complications.   Social:  Smoker    Hematology/Oncology:  Hematology Normal      Current/Recent Cancer. (colon) --  Cancer in past history (endometrial):    EENT/Dental:   Full dentures   Cardiovascular:   Hypertension, poorly controlled Patient cleared by her cardiologist, Dr. Zuniga.  08/05/2022 stress test negative with 64% EF.  Occasional PVCs.   Pulmonary:    Chronic cough from smoking   Renal/:   renal calculi    Hepatic/GI:  Hepatic/GI Normal    Musculoskeletal:   Arthritis   Spine Disorders: cervical Degenerative disease    Neurological:  Neurology Normal    Endocrine:   Diabetes    Psych:  Psychiatric Normal           Physical Exam  General: Well nourished, Cooperative, Alert and Oriented    Airway:  Mallampati: III   Mouth Opening: Normal  TM Distance: > 6 cm  Tongue: Normal  Neck ROM: Normal ROM    Dental:  Edentulous    Chest/Lungs:  Clear to auscultation, Normal Respiratory Rate    Heart:  Rate: Normal  Rhythm: Regular Rhythm  Sounds: Normal        Anesthesia Plan  Type of Anesthesia, risks & benefits discussed:    Anesthesia Type: Gen ETT  Intra-op Monitoring Plan: Standard ASA Monitors  Post Op Pain Control Plan: multimodal analgesia  Induction:  IV  Airway Plan: , Post-Induction  Informed Consent: Informed consent signed with the Patient and all parties understand the risks and agree with anesthesia plan.  All questions answered.   ASA Score: 3  Anesthesia Plan Notes: Multimodal analgesia:  IV acetaminophen, local per surgeon   Antiemetics:   Zofran, Pepcid, Decadron 4 mg    Ready For Surgery From Anesthesia Perspective.     .

## 2022-08-29 NOTE — H&P
Patient ID: Pebbles Boyle is a 71 y.o. female.     Chief Complaint: colon cancer     HPI:  Update H&P.  CT scan negative for metastatic disease.  CEA is 5.  She has received cardiac clearance.  We will proceed with right colectomy today.  71 year old female referred to the office with colon cancer ascending colon. She presented with fatigue. Found to be anemic. H and H was around 7.5 -8  per patient. Work up involved colonoscopy which showed a large fungating mass in the cecum and a 4 cm sessile polyp in the mid ascending colon. Path not back yet but cecal mass was large involving 2/3 circumference of the lumen and appeared malignant.  The ascending colon polyp also did not appear benign as well. She has no obstructive sytmpoms. She does not notice any melena.  She has family history of colon cancer in 3 siblings. She had not had colon cancer screening in the past.  She has past surgical history of right partial nephrectomy via flank incision, open cholecystectomy 40 years ago. She is a lifetime smoker.  No history of cardiac disease per patient.  Last cardiac catheterization 2014 and was okay per patient.          Past Medical History:   Diagnosis Date    Diabetes mellitus      Endometrial cancer      Full dentures      Hypertension      Macular degeneration      PVC (premature ventricular contraction)      Squamous cell carcinoma of skin              Past Surgical History:   Procedure Laterality Date    Bilateral Tubal ligation        CARDIAC CATHETERIZATION   2014     Dr. womack   -    negative    EYE SURGERY   1987     RK    GALLBLADDER SURGERY        LITHOTRIPSY        TONSILLECTOMY                Review of patient's allergies indicates:   Allergen Reactions    Tramadol Nausea And Vomiting    Codeine Other (See Comments)       Chest pains    Hydrocodone Other (See Comments)       hypotension           Medication List with Changes/Refills   Current Medications     ALBUTEROL (PROVENTIL/VENTOLIN HFA) 90  MCG/ACTUATION INHALER    inhale 2 puff by inhalation route  every 4 - 6 hours as needed as needed     AMLODIPINE (NORVASC) 5 MG TABLET    amlodipine 5 mg tablet     ANTIOX #8/OM3/DHA/EPA/LUT/ZEAX (PRESERVISION AREDS 2, OMEGA-3, ORAL)    Take 1 capsule by mouth 2 (two) times daily.      CLONAZEPAM (KLONOPIN) 0.5 MG TABLET    Take 0.5 mg by mouth 2 (two) times daily as needed for Anxiety.     CLONIDINE (CATAPRES) 0.1 MG TABLET    Take 1 tablet (0.1 mg total) by mouth 2 (two) times daily. Hold if heart rate < 60/min and SBP < 100 mmHg.     CYCLOBENZAPRINE (FLEXERIL) 10 MG TABLET    Take 10 mg by mouth 3 (three) times daily as needed for Muscle spasms.     FLUOROURACIL 5% 5 % SOLN    Apply twice each day to scalp as directed     GLIPIZIDE (GLUCOTROL) 5 MG TABLET    Take 2.5 mg by mouth 2 (two) times daily before meals.      GUAIFENESIN (MUCINEX ORAL)    Take by mouth.     LEVOFLOXACIN (LEVAQUIN) 500 MG TABLET    levofloxacin 500 mg tablet     LISINOPRIL-HYDROCHLOROTHIAZIDE (PRINZIDE,ZESTORETIC) 20-25 MG TAB    Take 1 tablet by mouth every morning.     METHOCARBAMOL (ROBAXIN) 500 MG TAB    Take 500 mg by mouth 4 (four) times daily.     METOPROLOL TARTRATE (LOPRESSOR) 100 MG TABLET    Take 100 mg by mouth 2 (two) times daily.     TERAZOSIN (HYTRIN) 1 MG CAPSULE    Take 1 mg by mouth every evening.     VITAMINS  A,C,E-ZINC-COPPER 14,320-226-200 UNIT-MG-UNIT CAP    Take by mouth.            Family History   Problem Relation Age of Onset    Breast cancer Sister      Melanoma Neg Hx      Psoriasis Neg Hx      Lupus Neg Hx      Eczema Neg Hx        Social History            Socioeconomic History    Marital status:    Tobacco Use    Smoking status: Current Every Day Smoker       Packs/day: 0.50       Years: 50.00       Pack years: 25.00    Smokeless tobacco: Never Used   Substance and Sexual Activity    Alcohol use: Yes       Comment: rarely    Drug use: No            Review of Systems   Constitutional: Negative for  appetite change, chills, fever and unexpected weight change.   HENT: Negative for hearing loss, rhinorrhea, sore throat and voice change.    Eyes: Negative for photophobia and visual disturbance.   Respiratory: Negative for cough, choking and shortness of breath.    Cardiovascular: Negative for chest pain, palpitations and leg swelling.   Gastrointestinal: Negative for abdominal pain, blood in stool, constipation, diarrhea, nausea and vomiting.   Endocrine: Negative for cold intolerance, heat intolerance, polydipsia and polyuria.   Musculoskeletal: Negative for arthralgias, back pain, joint swelling and neck stiffness.   Skin: Negative for color change, pallor and rash.   Neurological: Negative for dizziness, seizures, syncope and headaches.   Hematological: Negative for adenopathy. Does not bruise/bleed easily.   Psychiatric/Behavioral: Negative for agitation, behavioral problems and confusion.       Objective:   Physical Exam  Constitutional:       General: She is not in acute distress.     Appearance: Normal appearance. She is well-developed. She is not toxic-appearing.   HENT:      Head: Normocephalic and atraumatic. No abrasion or laceration.      Right Ear: External ear normal.      Left Ear: External ear normal.      Nose: Nose normal.   Eyes:      Pupils: Pupils are equal, round, and reactive to light.   Neck:      Trachea: Trachea and phonation normal. No tracheal deviation.   Cardiovascular:      Rate and Rhythm: Normal rate and regular rhythm.   Pulmonary:      Effort: Pulmonary effort is normal. No tachypnea, accessory muscle usage or respiratory distress.   Abdominal:      General: There is no distension.      Palpations: Abdomen is soft. There is no mass.      Tenderness: There is no abdominal tenderness. There is no guarding.      Hernia: No hernia is present.   Musculoskeletal:      Cervical back: Neck supple. Normal range of motion.   Lymphadenopathy:      Cervical: No cervical adenopathy.   Skin:      General: Skin is warm.   Neurological:      Mental Status: She is alert and oriented to person, place, and time.      Coordination: Coordination normal.      Gait: Gait normal.   Psychiatric:         Speech: Speech normal.         Behavior: Behavior normal.          Assessment/Plan:   Diagnoses and all orders for this visit:     Malignant neoplasm of ascending colon  -     CT Chest Abdomen Pelvis With Contrast (xpd); Future  -     CBC W/ AUTO DIFFERENTIAL; Future  -     COMPREHENSIVE METABOLIC PANEL; Future  -     CEA; Future     Patient found to have colon cancer in the cecum, ascending colon.  Will plan for right colectomy in the upcoming weeks.  Will order CBC, CMP and CEA today.  Will order CT of the chest abdomen pelvis for metastatic workup.  She has a very strong smoking history.  Will refer back to her cardiologist for cardiac clearance.  Follow with me in the next couple weeks to review CT scan and to set definite surgical date.

## 2022-08-30 PROBLEM — E61.1 IRON DEFICIENCY: Status: ACTIVE | Noted: 2022-08-30

## 2022-08-30 PROBLEM — I16.0 HYPERTENSIVE URGENCY: Status: ACTIVE | Noted: 2022-08-30

## 2022-08-30 PROBLEM — E87.1 HYPONATREMIA: Status: ACTIVE | Noted: 2022-08-30

## 2022-08-30 LAB
ANION GAP SERPL CALC-SCNC: 8 MMOL/L (ref 8–16)
BUN SERPL-MCNC: 9 MG/DL (ref 8–23)
CALCIUM SERPL-MCNC: 8 MG/DL (ref 8.7–10.5)
CHLORIDE SERPL-SCNC: 93 MMOL/L (ref 95–110)
CO2 SERPL-SCNC: 27 MMOL/L (ref 23–29)
CREAT SERPL-MCNC: 0.9 MG/DL (ref 0.5–1.4)
ERYTHROCYTE [DISTWIDTH] IN BLOOD BY AUTOMATED COUNT: 13.6 % (ref 11.5–14.5)
EST. GFR  (NO RACE VARIABLE): >60 ML/MIN/1.73 M^2
ESTIMATED AVG GLUCOSE: 123 MG/DL (ref 68–131)
GLUCOSE SERPL-MCNC: 138 MG/DL (ref 70–110)
GLUCOSE SERPL-MCNC: 150 MG/DL (ref 70–110)
GLUCOSE SERPL-MCNC: 157 MG/DL (ref 70–110)
GLUCOSE SERPL-MCNC: 164 MG/DL (ref 70–110)
GLUCOSE SERPL-MCNC: 170 MG/DL (ref 70–110)
HBA1C MFR BLD: 5.9 % (ref 4.5–6.2)
HCT VFR BLD AUTO: 26.3 % (ref 37–48.5)
HGB BLD-MCNC: 8.9 G/DL (ref 12–16)
MAGNESIUM SERPL-MCNC: 1.7 MG/DL (ref 1.6–2.6)
MCH RBC QN AUTO: 27.9 PG (ref 27–31)
MCHC RBC AUTO-ENTMCNC: 33.8 G/DL (ref 32–36)
MCV RBC AUTO: 82 FL (ref 82–98)
PLATELET # BLD AUTO: 141 K/UL (ref 150–450)
PMV BLD AUTO: 9.3 FL (ref 9.2–12.9)
POTASSIUM SERPL-SCNC: 3.5 MMOL/L (ref 3.5–5.1)
RBC # BLD AUTO: 3.19 M/UL (ref 4–5.4)
SODIUM SERPL-SCNC: 128 MMOL/L (ref 136–145)
WBC # BLD AUTO: 6.57 K/UL (ref 3.9–12.7)

## 2022-08-30 PROCEDURE — 12000002 HC ACUTE/MED SURGE SEMI-PRIVATE ROOM

## 2022-08-30 PROCEDURE — 94761 N-INVAS EAR/PLS OXIMETRY MLT: CPT

## 2022-08-30 PROCEDURE — S4991 NICOTINE PATCH NONLEGEND: HCPCS | Performed by: SURGERY

## 2022-08-30 PROCEDURE — 63600175 PHARM REV CODE 636 W HCPCS: Performed by: SURGERY

## 2022-08-30 PROCEDURE — 97116 GAIT TRAINING THERAPY: CPT

## 2022-08-30 PROCEDURE — 25000003 PHARM REV CODE 250: Performed by: STUDENT IN AN ORGANIZED HEALTH CARE EDUCATION/TRAINING PROGRAM

## 2022-08-30 PROCEDURE — 63600175 PHARM REV CODE 636 W HCPCS: Performed by: STUDENT IN AN ORGANIZED HEALTH CARE EDUCATION/TRAINING PROGRAM

## 2022-08-30 PROCEDURE — 80048 BASIC METABOLIC PNL TOTAL CA: CPT | Performed by: SURGERY

## 2022-08-30 PROCEDURE — 25000003 PHARM REV CODE 250: Performed by: SURGERY

## 2022-08-30 PROCEDURE — 97161 PT EVAL LOW COMPLEX 20 MIN: CPT

## 2022-08-30 PROCEDURE — 36415 COLL VENOUS BLD VENIPUNCTURE: CPT | Performed by: SURGERY

## 2022-08-30 PROCEDURE — 85027 COMPLETE CBC AUTOMATED: CPT | Performed by: SURGERY

## 2022-08-30 PROCEDURE — 83735 ASSAY OF MAGNESIUM: CPT | Performed by: STUDENT IN AN ORGANIZED HEALTH CARE EDUCATION/TRAINING PROGRAM

## 2022-08-30 RX ORDER — SODIUM CHLORIDE 9 MG/ML
INJECTION, SOLUTION INTRAVENOUS CONTINUOUS
Status: DISCONTINUED | OUTPATIENT
Start: 2022-08-30 | End: 2022-08-31

## 2022-08-30 RX ORDER — POTASSIUM CHLORIDE 7.45 MG/ML
40 INJECTION INTRAVENOUS
Status: DISCONTINUED | OUTPATIENT
Start: 2022-08-30 | End: 2022-09-02 | Stop reason: HOSPADM

## 2022-08-30 RX ORDER — MAGNESIUM SULFATE 1 G/100ML
1 INJECTION INTRAVENOUS
Status: DISCONTINUED | OUTPATIENT
Start: 2022-08-30 | End: 2022-09-02 | Stop reason: HOSPADM

## 2022-08-30 RX ORDER — ACETAMINOPHEN 325 MG/1
650 TABLET ORAL EVERY 6 HOURS PRN
Status: DISCONTINUED | OUTPATIENT
Start: 2022-08-30 | End: 2022-09-02 | Stop reason: HOSPADM

## 2022-08-30 RX ORDER — MAGNESIUM SULFATE HEPTAHYDRATE 40 MG/ML
4 INJECTION, SOLUTION INTRAVENOUS
Status: DISCONTINUED | OUTPATIENT
Start: 2022-08-30 | End: 2022-09-02 | Stop reason: HOSPADM

## 2022-08-30 RX ORDER — POTASSIUM CHLORIDE 7.45 MG/ML
20 INJECTION INTRAVENOUS
Status: DISCONTINUED | OUTPATIENT
Start: 2022-08-30 | End: 2022-09-02 | Stop reason: HOSPADM

## 2022-08-30 RX ORDER — LANOLIN ALCOHOL/MO/W.PET/CERES
800 CREAM (GRAM) TOPICAL
Status: DISCONTINUED | OUTPATIENT
Start: 2022-08-30 | End: 2022-09-02 | Stop reason: HOSPADM

## 2022-08-30 RX ORDER — TAMSULOSIN HYDROCHLORIDE 0.4 MG/1
0.4 CAPSULE ORAL ONCE
Status: COMPLETED | OUTPATIENT
Start: 2022-08-30 | End: 2022-08-30

## 2022-08-30 RX ORDER — POTASSIUM CHLORIDE 20 MEQ/1
20 TABLET, EXTENDED RELEASE ORAL
Status: DISCONTINUED | OUTPATIENT
Start: 2022-08-30 | End: 2022-09-02 | Stop reason: HOSPADM

## 2022-08-30 RX ORDER — SIMETHICONE 80 MG
1 TABLET,CHEWABLE ORAL
Status: DISCONTINUED | OUTPATIENT
Start: 2022-08-30 | End: 2022-09-02 | Stop reason: HOSPADM

## 2022-08-30 RX ORDER — OXYBUTYNIN CHLORIDE 5 MG/1
5 TABLET, EXTENDED RELEASE ORAL DAILY
Status: DISCONTINUED | OUTPATIENT
Start: 2022-08-31 | End: 2022-09-02 | Stop reason: HOSPADM

## 2022-08-30 RX ORDER — MAGNESIUM SULFATE HEPTAHYDRATE 40 MG/ML
2 INJECTION, SOLUTION INTRAVENOUS
Status: DISCONTINUED | OUTPATIENT
Start: 2022-08-30 | End: 2022-09-02 | Stop reason: HOSPADM

## 2022-08-30 RX ORDER — POTASSIUM CHLORIDE 20 MEQ/1
40 TABLET, EXTENDED RELEASE ORAL
Status: DISCONTINUED | OUTPATIENT
Start: 2022-08-30 | End: 2022-09-02 | Stop reason: HOSPADM

## 2022-08-30 RX ORDER — ONDANSETRON 2 MG/ML
4 INJECTION INTRAMUSCULAR; INTRAVENOUS EVERY 6 HOURS PRN
Status: DISCONTINUED | OUTPATIENT
Start: 2022-08-30 | End: 2022-09-02 | Stop reason: HOSPADM

## 2022-08-30 RX ADMIN — LOSARTAN POTASSIUM 100 MG: 50 TABLET, FILM COATED ORAL at 09:08

## 2022-08-30 RX ADMIN — DOXAZOSIN 2 MG: 1 TABLET ORAL at 08:08

## 2022-08-30 RX ADMIN — ONDANSETRON 4 MG: 2 INJECTION INTRAMUSCULAR; INTRAVENOUS at 05:08

## 2022-08-30 RX ADMIN — HYDROMORPHONE HYDROCHLORIDE 1 MG: 1 INJECTION, SOLUTION INTRAMUSCULAR; INTRAVENOUS; SUBCUTANEOUS at 12:08

## 2022-08-30 RX ADMIN — HYDROMORPHONE HYDROCHLORIDE 1 MG: 1 INJECTION, SOLUTION INTRAMUSCULAR; INTRAVENOUS; SUBCUTANEOUS at 04:08

## 2022-08-30 RX ADMIN — OXYCODONE AND ACETAMINOPHEN 1 TABLET: 5; 325 TABLET ORAL at 07:08

## 2022-08-30 RX ADMIN — LABETALOL HYDROCHLORIDE 200 MG: 100 TABLET, FILM COATED ORAL at 08:08

## 2022-08-30 RX ADMIN — SIMETHICONE 80 MG: 80 TABLET, CHEWABLE ORAL at 11:08

## 2022-08-30 RX ADMIN — HYDRALAZINE HYDROCHLORIDE 10 MG: 20 INJECTION INTRAMUSCULAR; INTRAVENOUS at 04:08

## 2022-08-30 RX ADMIN — CEFOXITIN SODIUM 2 G: 2 INJECTION, SOLUTION INTRAVENOUS at 01:08

## 2022-08-30 RX ADMIN — SODIUM CHLORIDE: 0.9 INJECTION, SOLUTION INTRAVENOUS at 11:08

## 2022-08-30 RX ADMIN — HYDROCHLOROTHIAZIDE 12.5 MG: 12.5 TABLET ORAL at 09:08

## 2022-08-30 RX ADMIN — CLONAZEPAM 0.5 MG: 0.5 TABLET ORAL at 10:08

## 2022-08-30 RX ADMIN — ONDANSETRON 8 MG: 4 TABLET, ORALLY DISINTEGRATING ORAL at 04:08

## 2022-08-30 RX ADMIN — MUPIROCIN 1 G: 20 OINTMENT TOPICAL at 09:08

## 2022-08-30 RX ADMIN — OXYCODONE AND ACETAMINOPHEN 1 TABLET: 5; 325 TABLET ORAL at 09:08

## 2022-08-30 RX ADMIN — TAMSULOSIN HYDROCHLORIDE 0.4 MG: 0.4 CAPSULE ORAL at 09:08

## 2022-08-30 RX ADMIN — SIMETHICONE 80 MG: 80 TABLET, CHEWABLE ORAL at 07:08

## 2022-08-30 RX ADMIN — ENOXAPARIN SODIUM 40 MG: 40 INJECTION SUBCUTANEOUS at 05:08

## 2022-08-30 RX ADMIN — PANTOPRAZOLE SODIUM 40 MG: 40 TABLET, DELAYED RELEASE ORAL at 06:08

## 2022-08-30 RX ADMIN — SIMETHICONE 80 MG: 80 TABLET, CHEWABLE ORAL at 02:08

## 2022-08-30 RX ADMIN — NICOTINE 1 PATCH: 7 PATCH, EXTENDED RELEASE TRANSDERMAL at 09:08

## 2022-08-30 RX ADMIN — MUPIROCIN 1 G: 20 OINTMENT TOPICAL at 08:08

## 2022-08-30 RX ADMIN — LABETALOL HYDROCHLORIDE 200 MG: 100 TABLET, FILM COATED ORAL at 09:08

## 2022-08-30 RX ADMIN — HYDROMORPHONE HYDROCHLORIDE 1 MG: 1 INJECTION, SOLUTION INTRAMUSCULAR; INTRAVENOUS; SUBCUTANEOUS at 08:08

## 2022-08-30 RX ADMIN — OXYCODONE AND ACETAMINOPHEN 1 TABLET: 5; 325 TABLET ORAL at 02:08

## 2022-08-30 RX ADMIN — CEFOXITIN SODIUM 2 G: 2 INJECTION, SOLUTION INTRAVENOUS at 07:08

## 2022-08-30 RX ADMIN — CLONAZEPAM 0.5 MG: 0.5 TABLET ORAL at 04:08

## 2022-08-30 RX ADMIN — OXYCODONE AND ACETAMINOPHEN 1 TABLET: 5; 325 TABLET ORAL at 04:08

## 2022-08-30 NOTE — PT/OT/SLP EVAL
Physical Therapy Evaluation    Patient Name:  Pebbles Boyle   MRN:  [id number]    Recommendations:     Discharge Recommendations:  home   Discharge Equipment Recommendations:  (Pt may require a RW for home depending on progress during therapy.)   Barriers to discharge: None    Assessment:     Pebbles Boyle is a 71 y.o. female admitted with a medical diagnosis of Malignant neoplasm of ascending colon.  She presents with the following impairments/functional limitations:  weakness, impaired endurance, gait instability, impaired functional mobility, impaired cardiopulmonary response to activity .    Rehab Prognosis: Good; patient would benefit from acute skilled PT services to address these deficits and reach maximum level of function.    Recent Surgery: Procedure(s) (LRB):  LAPAROSCOPY, HAND- ASSISTED (N/A)  COLECTOMY, RIGHT, LAPAROSCOPIC 1 Day Post-Op    Plan:     During this hospitalization, patient to be seen 5 x/week to address the identified rehab impairments via gait training, therapeutic activities, therapeutic exercises and progress toward the following goals:    Plan of Care Expires:  09/30/22    Subjective     Chief Complaint: post op pain  Patient/Family Comments/goals: Return home with her daughter  Pain/Comfort:  Pain Rating 1: 5/10  Location - Side 1: Bilateral  Location 1: abdomen  Pain Addressed 1: Pre-medicate for activity    Patients cultural, spiritual, Confucianist conflicts given the current situation:      Living Environment:  Pt lives with her daughter in a [facility] with threshold entrance  Prior to admission, patients level of function was independent.  Equipment used at home:  None.  DME owned (not currently used): none.  Upon discharge, patient will have assistance from her daughter.    Objective:     Communicated with nurse prior to session.  Patient found supine with telemetry, bed alarm  upon PT entry to room.    General Precautions: Standard, fall   Orthopedic Precautions:    Braces:     Respiratory Status: Nasal cannula, flow 1 L/min    Exams:  Cognitive Exam:  Patient is oriented to Person, Place, Time, and Situation  RLE ROM: WFL  RLE Strength: WFL  LLE ROM: WFL  LLE Strength: WFL    Functional Mobility:  Bed Mobility:     Supine to Sit: minimum assistance  Transfers:     Sit to Stand:  contact guard assistance with hand-held assist  Gait: 70 ft x2 RW and CGA    Therapeutic Activities and Exercises:  Pt was educated on the role of PT  for assessment, treatment with emphasis on safety and increased mobility and D/C  recommendations.  Pt t/f'ed OOB with Min A with complaint of chronic neck pain    AM-PAC 6 CLICK MOBILITY  Total Score:      Patient left up in chair with all lines intact, call button in reach, and her daughter present.    GOALS:   Multidisciplinary Problems       Physical Therapy Goals          Problem: Physical Therapy    Goal Priority Disciplines Outcome Goal Variances Interventions   Physical Therapy Goal     PT, PT/OT      Description: Goals to be met by: 2022    Patient will increase functional independence with mobility by performin. Supine to sit with Stand-by Assistance  2. Sit to stand transfer with Stand-by Assistance  3. Gait  x 100  feet with Stand-by Assistance using No Assistive Device.                          History:     Past Medical History:   Diagnosis Date    Arthritis     Diabetes mellitus     DVT complicating pregnancy     ?    Endometrial cancer     Full dentures     Hypertension     Macular degeneration     Osteoporosis     PVC (premature ventricular contraction)     Squamous cell carcinoma of skin        Past Surgical History:   Procedure Laterality Date    Bilateral Tubal ligation      CARDIAC CATHETERIZATION      Dr. womack   -    negative    EYE SURGERY      RK    GALLBLADDER SURGERY      HAND ASSISTED LAPAROSCOPY N/A 2022    Procedure: LAPAROSCOPY, HAND- ASSISTED;  Surgeon: Pranav Mccloud III, MD;  Location: [facility] OR;   Service: General;  Laterality: N/A;    LAPAROSCOPIC RIGHT COLON RESECTION  8/29/2022    Procedure: COLECTOMY, RIGHT, LAPAROSCOPIC;  Surgeon: Pranav Mccloud III, MD;  Location: [facility];  Service: General;;    LITHOTRIPSY      PARTIAL NEPHRECTOMY Right 2016    TONSILLECTOMY         Time Tracking:     PT Received On: 08/30/22  PT Start Time: 0957     PT Stop Time: 1015  PT Total Time (min): 18 min     Billable Minutes: Evaluation 9 minutes and Gait Training 9 minutes      08/30/2022

## 2022-08-30 NOTE — CONSULTS
Consult Note  Nephrology    Consult Requested By: Pranav Mccloud III, *    Reason for Consult: hyponatremia    SUBJECTIVE:     History of Present Illness:  72 y/o female patient admitted 8/29, is s/p R hemicolectomy d/t malignant neoplasm of ascending colon.  Pressures have been high, hydralazine was added on prn as of Monday.  Pt w/chronically low sodium, baseline is 130-131.  She is currently down to 128, and nephrology is consulted for co-management.    8/30  pt seen and examined.  States she was taken off HCTZ about a year and a half ago, developed generalized edema she describes as severe.  Explained to pt that I would hold this medication for now, but that we will watch her very closely, and that a different diuretic might be better for her going forward.  Voiced understanding.  Currently has NS at 75cc/hr, still on CL diet.  No edema, no SOB.  Still having some post-op abd pain.    Assessment/plan:    Hyponatremia  Anemia, post op  HTN  Mild hypokalemia  DM 2    --hold HCTZ for now.  Continue gentle hydration, would stop IV when PO intake is adequate  --transfusion goals per primary team  --continue current meds except HCTZ.  PRN hydralazine.  Will stop IVF when feasible  --electrolyte repletion per SS  --Blood glucose control per primary team.      Past Medical History:   Diagnosis Date    Arthritis     Diabetes mellitus     DVT complicating pregnancy     1970?    Endometrial cancer     Full dentures     Hypertension     Macular degeneration     Osteoporosis     PVC (premature ventricular contraction)     Squamous cell carcinoma of skin      Past Surgical History:   Procedure Laterality Date    Bilateral Tubal ligation      CARDIAC CATHETERIZATION  2014    Dr. womack   -    negative    EYE SURGERY  1987    RK    GALLBLADDER SURGERY      HAND ASSISTED LAPAROSCOPY N/A 8/29/2022    Procedure: LAPAROSCOPY, HAND- ASSISTED;  Surgeon: Pranav Mccloud III, MD;  Location: [facility];  Service: General;   Laterality: N/A;    LAPAROSCOPIC RIGHT COLON RESECTION  8/29/2022    Procedure: COLECTOMY, RIGHT, LAPAROSCOPIC;  Surgeon: Pranav Mccloud III, MD;  Location: [facility];  Service: General;;    LITHOTRIPSY      PARTIAL NEPHRECTOMY Right 2016    TONSILLECTOMY       Family History   Problem Relation Age of Onset    Breast cancer Sister     Melanoma Neg Hx     Psoriasis Neg Hx     Lupus Neg Hx     Eczema Neg Hx      Social History     Tobacco Use    Smoking status: Every Day     Packs/day: 0.25     Years: 59.00     Pack years: 14.75     Types: Cigarettes    Smokeless tobacco: Never   Substance Use Topics    Alcohol use: Not Currently     Comment: rarely    Drug use: No       Review of patient's allergies indicates:   Allergen Reactions    Tramadol Nausea And Vomiting    Codeine Other (See Comments)     Chest pains    Hydrocodone Other (See Comments)     hypotension        Review of Systems:  Negative except as noted above    OBJECTIVE:     Vital Signs Range (Last 24H):  Temp:  [97.8 °F (36.6 °C)-99.5 °F (37.5 °C)]   Pulse:  [65-82]   Resp:  [14-20]   BP: (145-202)/(58-90)   SpO2:  [95 %-100 %]     Physical Exam:  General- NAD noted  HEENT- WNL  Neck- supple  CV- Regular rate and rhythm  Resp- Lungs CTA Bilaterally, No increased WOB  GI- Non tender/non-distended, BS normoactive x4 quads  Extrem- No cyanosis, clubbing, edema.  Derm- skin w/d  Neuro-  No flap.     Body mass index is 22.89 kg/m².    Laboratory:  CBC:   Recent Labs   Lab 08/30/22  0510   WBC 6.57   RBC 3.19*   HGB 8.9*   HCT 26.3*   *   MCV 82   MCH 27.9   MCHC 33.8     CMP:   Recent Labs   Lab 08/26/22  1529 08/29/22  0610 08/30/22  0510   *   < > 138*   CALCIUM 8.4*   < > 8.0*   ALBUMIN 3.8  --   --    PROT 7.3  --   --    *   < > 128*   K 3.4*   < > 3.5   CO2 28   < > 27   CL 94*   < > 93*   BUN 18   < > 9   CREATININE 1.0   < > 0.9   ALKPHOS 98  --   --    ALT 16  --   --    AST 17  --   --    BILITOT 1.1*  --   --     < > = values in  this interval not displayed.       Diagnostic Results:  Labs: Reviewed    ASSESSMENT/PLAN:     Active Hospital Problems    Diagnosis  POA    *Malignant neoplasm of ascending colon [C18.2]  Yes    Hyponatremia [E87.1]  Yes    Hypertensive urgency [I16.0]  Yes    Iron deficiency [E61.1]  Yes    Type 2 diabetes mellitus without complication [E11.9]  Yes      Resolved Hospital Problems   No resolved problems to display.         Thank you for allowing us to participate in the care of your patient. We will follow the patient and provide recommendations as needed.      Time spent seeing patient( greater than 1/2 spent in direct contact) :    Jocelyne Cedeno NP

## 2022-08-30 NOTE — CONSULTS
[facility] Medicine    Progress Note    Patient Name: Pebbles Boyle  MRN: [id number]  Patient Class: IP- Inpatient   Admission Date: 8/29/2022  5:33 AM  Length of Stay: 1  Attending Physician: Annabel Wiley MD  Primary Care Provider: Janeth Quezada NP  Face-to-Face encounter date: 08/30/2022      Subjective:    Chief Complaint:  Status post right hemicolectomy    Principal problem:  Status post right hemicolectomy    HPI:  71-year-old  female with history of diet-controlled diabetes and uncontrolled hypertension just had right hemicolectomy on account of malignant neoplasm of ascending colon.  Hospital Medicine consulted for medical management.  Patient is currently status post surgery and is currently sleeping and her 2 daughters are by bedside given history.  They state that patient has had uncontrolled hypertension despite multiple many medications adjustment done by her cardiologist Dr Zuniga.  She denies any headaches, blurring of vision, weakness of extremities, or chest pain.  Her diabetes is mainly controlled with oral metformin and diet.  Patient had been noticing blood in her stool and she is found to have colon cancer hence the surgery.  She smokes 6 cigarettes daily and she states that she is currently weaning off.  She does not drink alcohol or use any other illicit medication.    Interval History:   8/29: Patient is currently doing well postop and pain is controlled.  Blood pressure uncontrolled, will place patient on p.r.n. hydralazine, stop daily Lopressor and start patient on labetalol 200 mg b.i.d. resume losartan HCTZ and terazosin substituted for doxazosin.  Check hemoglobin and iron levels. Family present at bedside.No concerns/issues overnight reported by the patient or the nursing staff.    8/30:  Blood pressure improving I will continue to titrate labetalol upwards as needed.  Drop in hemoglobin from 10.1-8.9, will continue to monitor his H&H.   Iron low at 26, replaced through IV ferrlicit.  Review of patient's chart shows that patient has chronic hyponatremia which could be due to the HCTZ patient takes.  However daughter states that patient cant come off  HCTZ because she needs it is due to fluid buildup.  Although patient is asymptomatic, would consult Nephrology for recommendations.  Patient complains of bladder spasms, DC Santillan and start patient on oxybutynin. She states she has not had any bowel movements or flatus, PT OT to begin therapy.     PAST MEDICAL HISTORY:     Past Medical History:   Diagnosis Date    Arthritis     Diabetes mellitus     DVT complicating pregnancy     1970?    Endometrial cancer     Full dentures     Hypertension     Macular degeneration     Osteoporosis     PVC (premature ventricular contraction)     Squamous cell carcinoma of skin        PAST SURGICAL HISTORY:     Past Surgical History:   Procedure Laterality Date    Bilateral Tubal ligation      CARDIAC CATHETERIZATION  2014    Dr. womack   -    negative    EYE SURGERY  1987    RK    GALLBLADDER SURGERY      HAND ASSISTED LAPAROSCOPY N/A 8/29/2022    Procedure: LAPAROSCOPY, HAND- ASSISTED;  Surgeon: Pranav Mccloud III, MD;  Location: [facility] OR;  Service: General;  Laterality: N/A;    LAPAROSCOPIC RIGHT COLON RESECTION  8/29/2022    Procedure: COLECTOMY, RIGHT, LAPAROSCOPIC;  Surgeon: Pranav Mccloud III, MD;  Location: [facility] OR;  Service: General;;    LITHOTRIPSY      PARTIAL NEPHRECTOMY Right 2016    TONSILLECTOMY         ALLERGIES:   Tramadol, Codeine, and Hydrocodone    FAMILY HISTORY:     Family History   Problem Relation Age of Onset    Breast cancer Sister     Melanoma Neg Hx     Psoriasis Neg Hx     Lupus Neg Hx     Eczema Neg Hx      Reviewed and non- contributory   SOCIAL HISTORY:     Social History     Tobacco Use    Smoking status: Every Day     Packs/day: 0.25     Years: 59.00     Pack years: 14.75     Types: Cigarettes    Smokeless tobacco: Never    Substance Use Topics    Alcohol use: Not Currently     Comment: rarely        Social History     Substance and Sexual Activity   Sexual Activity Not on file        HOME MEDICATIONS:     Prior to Admission medications    Medication Sig Start Date End Date Taking? Authorizing Provider   ANTIOX #8/OM3/DHA/EPA/LUT/ZEAX (PRESERVISION AREDS 2, OMEGA-3, ORAL) Take 1 capsule by mouth 2 (two) times a day.   Yes Historical Provider   cloNIDine (CATAPRES) 0.1 MG tablet Take 1 tablet (0.1 mg total) by mouth 2 (two) times daily. Hold if heart rate < 60/min and SBP < 100 mmHg.  Patient taking differently: Take 0.1 mg by mouth 2 (two) times daily as needed. Hold if heart rate < 60/min and SBP < 100 mmHg. 8/12/16 8/29/22 Yes Leyda Montoya MD   cyanocobalamin (VITAMIN B-12) 1000 MCG tablet Take 1,000 mcg by mouth once daily.   Yes Historical Provider   FEROSUL 325 mg (65 mg iron) Tab tablet Take by mouth every other day. 6/16/22  Yes Historical Provider   losartan-hydrochlorothiazide 100-12.5 mg (HYZAAR) 100-12.5 mg Tab Take 1 tablet by mouth once daily.   Yes Historical Provider   metoprolol tartrate (LOPRESSOR) 100 MG tablet Take 100 mg by mouth once daily.   Yes Historical Provider   pantoprazole (PROTONIX) 40 MG tablet Take 40 mg by mouth once daily.   Yes Historical Provider   terazosin (HYTRIN) 1 MG capsule Take 2 mg by mouth every evening.   Yes Historical Provider   vitamin D (VITAMIN D3) 1000 units Tab Take 1,000 Units by mouth once daily.   Yes Historical Provider   vitamins  A,C,E-zinc-copper 14,320-226-200 unit-mg-unit Cap Take 1 capsule by mouth as needed.   Yes Historical Provider   albuterol (PROVENTIL/VENTOLIN HFA) 90 mcg/actuation inhaler inhale 2 puff by inhalation route  every 4 - 6 hours as needed as needed 3/16/18   Historical Provider   clonazePAM (KLONOPIN) 0.5 MG tablet Take 0.5 mg by mouth 2 (two) times daily as needed for Anxiety.    Historical Provider   cyclobenzaprine (FLEXERIL) 10 MG tablet Take 10 mg  "by mouth 3 (three) times daily as needed for Muscle spasms.    Historical Provider       REVIEW OF SYSTEMS:   10 Point Review of System was performed and was found to be negative except for that mentioned already in the HPI above.     PHYSICAL EXAM:   BP (!) 168/84   Pulse 74   Temp 98.2 °F (36.8 °C) (Oral)   Resp 18   Ht 5' 4" (1.626 m)   Wt 60.5 kg (133 lb 6.1 oz)   LMP  (LMP Unknown)   SpO2 97%   Breastfeeding No   BMI 22.89 kg/m²     Vitals Reviewed  General appearance: Well-developed, well-nourished female in no apparent distress.  HENT: Atraumatic head. Moist mucous membranes of oral cavity.  Eyes: Normal extraocular movements.   Neck: Supple.   Lungs: Clear to auscultation bilaterally. No wheezing present.   Heart: Regular rate and rhythm. S1 and S2 present with no murmurs/gallop/rub. No pedal edema. No JVD present.   Abdomen: Soft, non-distended, non-tender. No rebound tenderness/guarding. Bowel sounds are normal.   Extremities: No cyanosis, clubbing, or edema.  Skin: No Rash.   Neuro: Motor and sensory exams grossly intact. Good tone. Muscle strength 5/5 in all 4 extremities  Psych/mental status: Appropriate mood and affect. Responds appropriately to questions.       Following labs were Reviewed   CBC:  Recent Labs   Lab 08/30/22  0510   WBC 6.57   HGB 8.9*   HCT 26.3*   *     CMP:  Recent Labs   Lab 08/30/22  0510   CALCIUM 8.0*   *   K 3.5   CO2 27   CL 93*   BUN 9   CREATININE 0.9       Micro Results  Microbiology Results (last 7 days)       ** No results found for the last 168 hours. **             Radiology Reports  X-Ray Chest PA And Lateral  Result Date: 8/26/2022  IMPRESSION: No acute cardiopulmonary abnormality. Electronically signed by:  Zander Jimenez MD  8/26/2022 3:44 PM CDT Workstation: [id number]         Meds  Scheduled Meds:   ceFOXItin (MEFOXIN) IVPB  2 g Intravenous Q6H    doxazosin  2 mg Oral QHS    enoxaparin  40 mg Subcutaneous Daily    hydroCHLOROthiazide  12.5 mg " Oral Daily    labetaloL  200 mg Oral Q12H    losartan  100 mg Oral Daily    mupirocin  1 g Nasal BID    nicotine  1 patch Transdermal Daily    [START ON 8/31/2022] oxybutynin  5 mg Oral Daily    pantoprazole  40 mg Oral Daily    simethicone  1 tablet Oral TID PC     Continuous Infusions:   sodium chloride 0.9%       PRN Meds:.clonazePAM, dextrose 10%, dextrose 10%, dextrose 50%, glucagon (human recombinant), hydrALAZINE, HYDROmorphone, insulin aspart U-100, labetalol, ondansetron, oxyCODONE-acetaminophen, sodium chloride 0.9%.    Assessment & Plan:     Active Hospital Problems    Diagnosis    *Malignant neoplasm of ascending colon  Status post right nahomy colectomy D 1  Continue to monitor H&H closely  Surgery on board  Replace iron with IV ferrlicit  Currently on liquid diet      Hyponatremia  Chronic  Secondary to HCTZ use?  Consult nephrology      Hypertensive urgency  Improving  Continue losartan HCTZ , titrate upwards labetalol 200 mg b.i.d. as needed  Continue doxazosin      Type 2 diabetes mellitus without complication  Insulin sliding scale  Regular Accu-Chek         Thank you for your consult. I will follow-up with patient. Please contact us if you have any additional questions  Annabel Wiley MD  Department of Hospital Medicine   [facility]

## 2022-08-30 NOTE — PROGRESS NOTES
[facility]  General Surgery  Progress Note    Subjective:     Interval History:  No acute events overnight.  Awake and alert.  Reports no nausea this morning.  No flatus.  Hemodynamically stable.  Blood pressure ranging 140s to 180s systolic.  Afebrile.    Post-Op Info:  Procedure(s) (LRB):  LAPAROSCOPY, HAND- ASSISTED (N/A)  COLECTOMY, RIGHT, LAPAROSCOPIC   1 Day Post-Op      Medications:  Continuous Infusions:   lactated ringers 100 mL/hr at 08/29/22 1945     Scheduled Meds:   ceFOXItin (MEFOXIN) IVPB  2 g Intravenous Q6H    doxazosin  2 mg Oral QHS    enoxaparin  40 mg Subcutaneous Daily    hydroCHLOROthiazide  12.5 mg Oral Daily    labetaloL  200 mg Oral Q12H    losartan  100 mg Oral Daily    mupirocin  1 g Nasal BID    nicotine  1 patch Transdermal Daily    pantoprazole  40 mg Oral Daily     PRN Meds:clonazePAM, dextrose 10%, dextrose 10%, dextrose 50%, glucagon (human recombinant), hydrALAZINE, HYDROmorphone, insulin aspart U-100, labetalol, ondansetron, oxyCODONE-acetaminophen, sodium chloride 0.9%     Objective:     Vital Signs (Most Recent):  Temp: 98.2 °F (36.8 °C) (08/30/22 0724)  Pulse: 74 (08/30/22 0724)  Resp: 18 (08/30/22 0724)  BP: (!) 168/84 (08/30/22 0724)  SpO2: 97 % (08/30/22 0724)   Vital Signs (24h Range):  Temp:  [97.8 °F (36.6 °C)-99.5 °F (37.5 °C)] 98.2 °F (36.8 °C)  Pulse:  [65-82] 74  Resp:  [14-24] 18  SpO2:  [95 %-100 %] 97 %  BP: (140-217)/(58-90) 168/84       Intake/Output Summary (Last 24 hours) at 8/30/2022 8778  Last data filed at 8/30/2022 0552  Gross per 24 hour   Intake 4371.67 ml   Output 2650 ml   Net 1721.67 ml       Physical Exam  Abdominal:      General: There is no distension.      Palpations: Abdomen is soft.      Tenderness: There is abdominal tenderness.      Comments: Appropriately tender.  Not significantly distended.   Neurological:      Mental Status: She is alert and oriented to person, place, and time.       Significant Labs:  CBC:   Recent Labs    Lab 08/30/22  0510   WBC 6.57   RBC 3.19*   HGB 8.9*   HCT 26.3*   *   MCV 82   MCH 27.9   MCHC 33.8     CMP:   Recent Labs   Lab 08/30/22  0510   *   CALCIUM 8.0*   *   K 3.5   CO2 27   CL 93*   BUN 9   CREATININE 0.9       Significant Diagnostics:  I have reviewed all pertinent imaging results/findings within the past 24 hours.    Assessment/Plan:     Active Diagnoses:    Diagnosis Date Noted POA    PRINCIPAL PROBLEM:  Malignant neoplasm of ascending colon [C18.2] 08/29/2022 Yes      Problems Resolved During this Admission:   Postop day 1 right colectomy  -will start sips of clears today.  Will decrease IV fluids  -ambulate, PT  -IS  -discontinue Santillan  -DVT prophylaxis    Pranav Mccloud III, MD  General Surgery  [facility]

## 2022-08-31 LAB
ANION GAP SERPL CALC-SCNC: 7 MMOL/L (ref 8–16)
BUN SERPL-MCNC: 7 MG/DL (ref 8–23)
CALCIUM SERPL-MCNC: 8.1 MG/DL (ref 8.7–10.5)
CHLORIDE SERPL-SCNC: 93 MMOL/L (ref 95–110)
CO2 SERPL-SCNC: 29 MMOL/L (ref 23–29)
CREAT SERPL-MCNC: 0.7 MG/DL (ref 0.5–1.4)
ERYTHROCYTE [DISTWIDTH] IN BLOOD BY AUTOMATED COUNT: 13.7 % (ref 11.5–14.5)
EST. GFR  (NO RACE VARIABLE): >60 ML/MIN/1.73 M^2
GLUCOSE SERPL-MCNC: 135 MG/DL (ref 70–110)
GLUCOSE SERPL-MCNC: 179 MG/DL (ref 70–110)
GLUCOSE SERPL-MCNC: 187 MG/DL (ref 70–110)
GLUCOSE SERPL-MCNC: 189 MG/DL (ref 70–110)
HCT VFR BLD AUTO: 27.1 % (ref 37–48.5)
HGB BLD-MCNC: 9.2 G/DL (ref 12–16)
MAGNESIUM SERPL-MCNC: 1.6 MG/DL (ref 1.6–2.6)
MCH RBC QN AUTO: 28.3 PG (ref 27–31)
MCHC RBC AUTO-ENTMCNC: 33.9 G/DL (ref 32–36)
MCV RBC AUTO: 83 FL (ref 82–98)
PLATELET # BLD AUTO: 152 K/UL (ref 150–450)
PMV BLD AUTO: 9.7 FL (ref 9.2–12.9)
POTASSIUM SERPL-SCNC: 3.4 MMOL/L (ref 3.5–5.1)
RBC # BLD AUTO: 3.25 M/UL (ref 4–5.4)
SODIUM SERPL-SCNC: 129 MMOL/L (ref 136–145)
WBC # BLD AUTO: 6.19 K/UL (ref 3.9–12.7)

## 2022-08-31 PROCEDURE — 80048 BASIC METABOLIC PNL TOTAL CA: CPT | Performed by: SURGERY

## 2022-08-31 PROCEDURE — 25000003 PHARM REV CODE 250: Performed by: INTERNAL MEDICINE

## 2022-08-31 PROCEDURE — 63600175 PHARM REV CODE 636 W HCPCS: Performed by: STUDENT IN AN ORGANIZED HEALTH CARE EDUCATION/TRAINING PROGRAM

## 2022-08-31 PROCEDURE — 25000003 PHARM REV CODE 250: Performed by: STUDENT IN AN ORGANIZED HEALTH CARE EDUCATION/TRAINING PROGRAM

## 2022-08-31 PROCEDURE — 83735 ASSAY OF MAGNESIUM: CPT | Performed by: STUDENT IN AN ORGANIZED HEALTH CARE EDUCATION/TRAINING PROGRAM

## 2022-08-31 PROCEDURE — 12000002 HC ACUTE/MED SURGE SEMI-PRIVATE ROOM

## 2022-08-31 PROCEDURE — 27000221 HC OXYGEN, UP TO 24 HOURS

## 2022-08-31 PROCEDURE — 36415 COLL VENOUS BLD VENIPUNCTURE: CPT | Performed by: SURGERY

## 2022-08-31 PROCEDURE — 99900035 HC TECH TIME PER 15 MIN (STAT)

## 2022-08-31 PROCEDURE — 94761 N-INVAS EAR/PLS OXIMETRY MLT: CPT

## 2022-08-31 PROCEDURE — S4991 NICOTINE PATCH NONLEGEND: HCPCS | Performed by: SURGERY

## 2022-08-31 PROCEDURE — 25000003 PHARM REV CODE 250: Performed by: SURGERY

## 2022-08-31 PROCEDURE — 63600175 PHARM REV CODE 636 W HCPCS: Performed by: SURGERY

## 2022-08-31 PROCEDURE — 85027 COMPLETE CBC AUTOMATED: CPT | Performed by: SURGERY

## 2022-08-31 PROCEDURE — 97116 GAIT TRAINING THERAPY: CPT

## 2022-08-31 RX ORDER — SODIUM CHLORIDE 9 MG/ML
INJECTION, SOLUTION INTRAVENOUS CONTINUOUS
Status: DISCONTINUED | OUTPATIENT
Start: 2022-08-31 | End: 2022-09-01

## 2022-08-31 RX ORDER — LABETALOL 100 MG/1
200 TABLET, FILM COATED ORAL 3 TIMES DAILY
Status: DISCONTINUED | OUTPATIENT
Start: 2022-08-31 | End: 2022-09-02 | Stop reason: HOSPADM

## 2022-08-31 RX ORDER — POLYETHYLENE GLYCOL 3350 17 G/17G
17 POWDER, FOR SOLUTION ORAL DAILY
Status: DISCONTINUED | OUTPATIENT
Start: 2022-08-31 | End: 2022-09-02 | Stop reason: HOSPADM

## 2022-08-31 RX ADMIN — LABETALOL HYDROCHLORIDE 200 MG: 100 TABLET, FILM COATED ORAL at 08:08

## 2022-08-31 RX ADMIN — ONDANSETRON 4 MG: 2 INJECTION INTRAMUSCULAR; INTRAVENOUS at 12:08

## 2022-08-31 RX ADMIN — POTASSIUM CHLORIDE 40 MEQ: 1500 TABLET, EXTENDED RELEASE ORAL at 08:08

## 2022-08-31 RX ADMIN — HYDROMORPHONE HYDROCHLORIDE 1 MG: 1 INJECTION, SOLUTION INTRAMUSCULAR; INTRAVENOUS; SUBCUTANEOUS at 07:08

## 2022-08-31 RX ADMIN — DOXAZOSIN 2 MG: 1 TABLET ORAL at 09:08

## 2022-08-31 RX ADMIN — ENOXAPARIN SODIUM 40 MG: 40 INJECTION SUBCUTANEOUS at 04:08

## 2022-08-31 RX ADMIN — OXYBUTYNIN CHLORIDE 5 MG: 5 TABLET, EXTENDED RELEASE ORAL at 08:08

## 2022-08-31 RX ADMIN — OXYCODONE AND ACETAMINOPHEN 1 TABLET: 5; 325 TABLET ORAL at 01:08

## 2022-08-31 RX ADMIN — SIMETHICONE 80 MG: 80 TABLET, CHEWABLE ORAL at 09:08

## 2022-08-31 RX ADMIN — NICOTINE 1 PATCH: 7 PATCH, EXTENDED RELEASE TRANSDERMAL at 08:08

## 2022-08-31 RX ADMIN — CEFOXITIN SODIUM 2 G: 2 INJECTION, SOLUTION INTRAVENOUS at 07:08

## 2022-08-31 RX ADMIN — MUPIROCIN 1 G: 20 OINTMENT TOPICAL at 09:08

## 2022-08-31 RX ADMIN — SIMETHICONE 80 MG: 80 TABLET, CHEWABLE ORAL at 01:08

## 2022-08-31 RX ADMIN — LOSARTAN POTASSIUM 100 MG: 50 TABLET, FILM COATED ORAL at 08:08

## 2022-08-31 RX ADMIN — OXYCODONE AND ACETAMINOPHEN 1 TABLET: 5; 325 TABLET ORAL at 05:08

## 2022-08-31 RX ADMIN — POLYETHYLENE GLYCOL 3350 17 G: 17 POWDER, FOR SOLUTION ORAL at 08:08

## 2022-08-31 RX ADMIN — PANTOPRAZOLE SODIUM 40 MG: 40 TABLET, DELAYED RELEASE ORAL at 05:08

## 2022-08-31 RX ADMIN — SIMETHICONE 80 MG: 80 TABLET, CHEWABLE ORAL at 08:08

## 2022-08-31 RX ADMIN — MUPIROCIN 1 G: 20 OINTMENT TOPICAL at 08:08

## 2022-08-31 RX ADMIN — SODIUM CHLORIDE: 0.9 INJECTION, SOLUTION INTRAVENOUS at 01:08

## 2022-08-31 RX ADMIN — SODIUM CHLORIDE: 0.9 INJECTION, SOLUTION INTRAVENOUS at 11:08

## 2022-08-31 RX ADMIN — HYDRALAZINE HYDROCHLORIDE 10 MG: 20 INJECTION INTRAMUSCULAR; INTRAVENOUS at 04:08

## 2022-08-31 RX ADMIN — ONDANSETRON 4 MG: 2 INJECTION INTRAMUSCULAR; INTRAVENOUS at 06:08

## 2022-08-31 RX ADMIN — LABETALOL HYDROCHLORIDE 200 MG: 100 TABLET, FILM COATED ORAL at 03:08

## 2022-08-31 RX ADMIN — Medication 800 MG: at 08:08

## 2022-08-31 RX ADMIN — LABETALOL HYDROCHLORIDE 200 MG: 100 TABLET, FILM COATED ORAL at 09:08

## 2022-08-31 RX ADMIN — CEFOXITIN SODIUM 2 G: 2 INJECTION, SOLUTION INTRAVENOUS at 01:08

## 2022-08-31 NOTE — PLAN OF CARE
Problem: Oral Intake Inadequate  Goal: Improved Oral Intake  Outcome: Ongoing, Progressing  Intervention: Promote and Optimize Oral Intake  Flowsheets (Taken 8/31/2022 8452)  Oral Nutrition Promotion: calorie-dense liquids provided; no chocolate. Ordered Unjury with meals to replace Glucerna.

## 2022-08-31 NOTE — PROGRESS NOTES
Replaced by [facility]  General Surgery  Progress Note    Subjective:     Interval History: feeling little queasy. No consistent flatus. Little appetite. Afebrile.     Post-Op Info:  Procedure(s) (LRB):  LAPAROSCOPY, HAND- ASSISTED (N/A)  COLECTOMY, RIGHT, LAPAROSCOPIC   2 Days Post-Op      Medications:  Continuous Infusions:   sodium chloride 0.9% 50 mL/hr at 08/31/22 1157     Scheduled Meds:   doxazosin  2 mg Oral QHS    enoxaparin  40 mg Subcutaneous Daily    labetaloL  200 mg Oral TID    losartan  100 mg Oral Daily    mupirocin  1 g Nasal BID    nicotine  1 patch Transdermal Daily    oxybutynin  5 mg Oral Daily    pantoprazole  40 mg Oral Daily    polyethylene glycol  17 g Oral Daily    simethicone  1 tablet Oral TID PC     PRN Meds:acetaminophen, calcium chloride IVPB, calcium chloride IVPB, calcium chloride IVPB, clonazePAM, dextrose 10%, dextrose 10%, dextrose 50%, glucagon (human recombinant), hydrALAZINE, insulin aspart U-100, labetalol, magnesium oxide, magnesium sulfate IVPB, magnesium sulfate IVPB, magnesium sulfate IVPB, magnesium sulfate IVPB, ondansetron, oxyCODONE-acetaminophen, potassium chloride in water, potassium chloride in water, potassium chloride in water, potassium chloride in water, potassium chloride, potassium chloride, potassium chloride, potassium chloride, sodium chloride 0.9%, sodium phosphate IVPB, sodium phosphate IVPB, sodium phosphate IVPB, sodium phosphate IVPB, sodium phosphate IVPB     Objective:     Vital Signs (Most Recent):  Temp: 98.1 °F (36.7 °C) (08/31/22 1642)  Pulse: 72 (08/31/22 1642)  Resp: 18 (08/31/22 1642)  BP: (!) 162/90 (08/31/22 1642)  SpO2: 97 % (08/31/22 1642)   Vital Signs (24h Range):  Temp:  [97.7 °F (36.5 °C)-98.3 °F (36.8 °C)] 98.1 °F (36.7 °C)  Pulse:  [70-87] 72  Resp:  [15-18] 18  SpO2:  [97 %-100 %] 97 %  BP: (145-197)/(60-90) 162/90     No intake or output data in the 24 hours ending 08/31/22 4968    Physical Exam  Abdominal:      General: There  is distension.      Palpations: Abdomen is soft.      Tenderness: There is abdominal tenderness.      Comments: Appropriately tender. Mildly distended.   Neurological:      Mental Status: She is alert and oriented to person, place, and time.       Significant Labs:  CBC:   Recent Labs   Lab 08/31/22  0522   WBC 6.19   RBC 3.25*   HGB 9.2*   HCT 27.1*      MCV 83   MCH 28.3   MCHC 33.9     CMP:   Recent Labs   Lab 08/31/22 0522   *   CALCIUM 8.1*   *   K 3.4*   CO2 29   CL 93*   BUN 7*   CREATININE 0.7       Significant Diagnostics:  I have reviewed all pertinent imaging results/findings within the past 24 hours.    Assessment/Plan:     Active Diagnoses:    Diagnosis Date Noted POA    PRINCIPAL PROBLEM:  Malignant neoplasm of ascending colon [C18.2] 08/29/2022 Yes    Hyponatremia [E87.1] 08/30/2022 Yes    Hypertensive urgency [I16.0] 08/30/2022 Yes    Iron deficiency [E61.1] 08/30/2022 Yes    Type 2 diabetes mellitus without complication [E11.9] 08/10/2016 Yes      Problems Resolved During this Admission:   -clear liquids.   -PT  -IS  -DVT prophylaxis.       Pranav Mccloud III, MD  General Surgery  [facility]

## 2022-08-31 NOTE — CONSULTS
"[facility]  Adult Nutrition   Consult Note (Initial Assessment)     SUMMARY     Recommendations  Recommendation/Intervention: 1. Advance diet as tolerated to Diabetic 2000 kcal diet. 2. Recommend Unjury with meals for added protein. 3.  to obtain daily meal choices when apropriate.  Goals: 1. Patient to consume >/= 75% estimated protein/energy needs to maintain weight. 2. Lab values trend to target range.  Nutrition Goal Status: new  Communication of RD Recs: reviewed with RN    Dietitian Rounds Brief  RD Consult to evaluate and rx. Patient admit for colon cancer ; s/p right colectomy.    Diet order:   Current Diet Order: Full Liquid diabetic diet      Evaluation of Received Nutrient/Fluid Intake  Energy Calories Required: not meeting needs  Protein Required: not meeting needs  Fluid Required: not meeting needs     % Intake of Estimated Energy Needs: 25 - 50 %  % Meal Intake: 25 - 50 %    No intake or output data in the 24 hours ending 08/31/22 1430     Anthropometrics  Temp: 98.3 °F (36.8 °C)  Height Method: Stated  Height: 5' 4" (162.6 cm)  Height (inches): 64 in  Weight Method: Bed Scale  Weight: 60.5 kg (133 lb 6.1 oz)  Weight (lb): 133.38 lb  Ideal Body Weight (IBW), Female: 120 lb  % Ideal Body Weight, Female (lb): 111.15 %  BMI (Calculated): 22.9       Estimated/Assessed Needs  Weight Used For Calorie Calculations: 60.5 kg (133 lb 6.1 oz)  Energy Calorie Requirements (kcal): 8769-0745 kcal/day (30-35 kcal/kg)     Protein Requirements:  gm/day (1.5-2.0 gm/kg)  Weight Used For Protein Calculations: 60.5 kg (133 lb 6.1 oz)     Estimated Fluid Requirement Method: RDA Method  RDA Method (mL): 1815     Reason for Assessment  Reason For Assessment: consult (eval and rx)  Diagnosis: cancer diagnosis/related complications  Relevant Medical History: endometrial ca, DM 2, HTN DVT, dentures    Nutrition/Diet History  Spiritual, Cultural Beliefs, Latter-day Practices, Values that Affect Care: " no  Food Allergies: NKFA  Factors Affecting Nutritional Intake: None identified at this time    Nutrition Risk Screen  Nutrition Risk Screen: no indicators present     MST Score: 0  Have you recently lost weight without trying?: No  Weight loss score: 0  Have you been eating poorly because of a decreased appetite?: No  Appetite score: 0     Weight History:  Wt Readings from Last 5 Encounters:   08/29/22 60.5 kg (133 lb 6.1 oz)   08/26/22 59.1 kg (130 lb 3.2 oz)   07/19/22 59.1 kg (130 lb 4.8 oz)   07/12/22 62.6 kg (138 lb 0.1 oz)   10/29/21 62.6 kg (138 lb)      Lab/Procedures/Meds: Pertinent Labs/Meds Reviewed    Medications:Pertinent Medications Reviewed  Scheduled Meds:   doxazosin  2 mg Oral QHS    enoxaparin  40 mg Subcutaneous Daily    labetaloL  200 mg Oral TID    losartan  100 mg Oral Daily    mupirocin  1 g Nasal BID    nicotine  1 patch Transdermal Daily    oxybutynin  5 mg Oral Daily    pantoprazole  40 mg Oral Daily    polyethylene glycol  17 g Oral Daily    simethicone  1 tablet Oral TID PC     Continuous Infusions:   sodium chloride 0.9% 50 mL/hr at 08/31/22 1157     PRN Meds:.acetaminophen, calcium chloride IVPB, calcium chloride IVPB, calcium chloride IVPB, clonazePAM, dextrose 10%, dextrose 10%, dextrose 50%, glucagon (human recombinant), hydrALAZINE, insulin aspart U-100, labetalol, magnesium oxide, magnesium sulfate IVPB, magnesium sulfate IVPB, magnesium sulfate IVPB, magnesium sulfate IVPB, ondansetron, oxyCODONE-acetaminophen, potassium chloride in water, potassium chloride in water, potassium chloride in water, potassium chloride in water, potassium chloride, potassium chloride, potassium chloride, potassium chloride, sodium chloride 0.9%, sodium phosphate IVPB, sodium phosphate IVPB, sodium phosphate IVPB, sodium phosphate IVPB, sodium phosphate IVPB    Labs: Pertinent Labs Reviewed  Clinical Chemistry:  Recent Labs   Lab 08/26/22  1529 08/31/22  0522   * 129*   K 3.4* 3.4*   CL 94* 93*    CO2 28 29   * 135*   BUN 18 7*   CREATININE 1.0 0.7   CALCIUM 8.4* 8.1*   PROT 7.3  --    ALBUMIN 3.8  --    BILITOT 1.1*  --    ALKPHOS 98  --    AST 17  --    ALT 16  --    ANIONGAP 8 7*   MG  --  1.6    < > = values in this interval not displayed.     CBC:   Recent Labs   Lab 08/31/22  0522   WBC 6.19   RBC 3.25*   HGB 9.2*   HCT 27.1*      MCV 83   MCH 28.3   MCHC 33.9     Diabetes:  Recent Labs   Lab 08/29/22  1623   HGBA1C 5.9     Monitor and Evaluation  Food and Nutrient Intake: energy intake, food and beverage intake  Food and Nutrient Adminstration: diet order  Knowledge/Beliefs/Attitudes: food and nutrition knowledge/skill  Physical Activity and Function: nutrition-related ADLs and IADLs  Anthropometric Measurements: weight, weight change, body mass index  Biochemical Data, Medical Tests and Procedures: electrolyte and renal panel, inflammatory profile, gastrointestinal profile, lipid profile, glucose/endocrine profile  Nutrition-Focused Physical Findings: overall appearance     Nutrition Risk  Level of Risk/Frequency of Follow-up: moderate - high     Nutrition Follow-Up  RD Follow-up?: Yes      Tabby Newell RD, LDN 08/31/2022 2:29 PM

## 2022-08-31 NOTE — PROGRESS NOTES
Progress Note  Nephrology    Consult Requested By: Pranav Mccloud III, *    Reason for Consult: hyponatremia    SUBJECTIVE:     History of Present Illness:  72 y/o female patient admitted 8/29, is s/p R hemicolectomy d/t malignant neoplasm of ascending colon.  Pressures have been high, hydralazine was added on prn as of Monday.  Pt w/chronically low sodium, baseline is 130-131.  She is currently down to 128, and nephrology is consulted for co-management.    8/30  pt seen and examined.  States she was taken off HCTZ about a year and a half ago, developed generalized edema she describes as severe.  Explained to pt that I would hold this medication for now, but that we will watch her very closely, and that a different diuretic might be better for her going forward.  Voiced understanding.  Currently has NS at 75cc/hr, still on CL diet.  No edema, no SOB.  Still having some post-op abd pain.  8/31  Na+ improved to 129.  Daughter at bedside today, pt got HCTZ yesterday, holding today, explained why.  Told her the plan is to use lasix going forward.  Voiced understanding and seems ok with this.  Pt c/o fatigue today--Hgb actually improved.  K+ low, has SS repletion orders.  Diet changed to FL today.    Assessment/plan:    Hyponatremia  Anemia, post op  HTN  Mild hypokalemia  DM 2    --hold HCTZ for now.  Continue gentle hydration, would stop IV when PO intake is adequate  --transfusion goals per primary team  --continue current meds except HCTZ.  PRN hydralazine.  Will stop IVF when feasible  --electrolyte repletion per SS  --Blood glucose control per primary team.      Past Medical History:   Diagnosis Date    Arthritis     Diabetes mellitus     DVT complicating pregnancy     1970?    Endometrial cancer     Full dentures     Hypertension     Macular degeneration     Osteoporosis     PVC (premature ventricular contraction)     Squamous cell carcinoma of skin      Past Surgical History:   Procedure Laterality Date     Bilateral Tubal ligation      CARDIAC CATHETERIZATION  2014    Dr. womack   -    negative    EYE SURGERY  1987    RK    GALLBLADDER SURGERY      HAND ASSISTED LAPAROSCOPY N/A 8/29/2022    Procedure: LAPAROSCOPY, HAND- ASSISTED;  Surgeon: Pranav Mccloud III, MD;  Location: [facility] OR;  Service: General;  Laterality: N/A;    LAPAROSCOPIC RIGHT COLON RESECTION  8/29/2022    Procedure: COLECTOMY, RIGHT, LAPAROSCOPIC;  Surgeon: Pranav Mccloud III, MD;  Location: [facility] OR;  Service: General;;    LITHOTRIPSY      PARTIAL NEPHRECTOMY Right 2016    TONSILLECTOMY       Family History   Problem Relation Age of Onset    Breast cancer Sister     Melanoma Neg Hx     Psoriasis Neg Hx     Lupus Neg Hx     Eczema Neg Hx      Social History     Tobacco Use    Smoking status: Every Day     Packs/day: 0.25     Years: 59.00     Pack years: 14.75     Types: Cigarettes    Smokeless tobacco: Never   Substance Use Topics    Alcohol use: Not Currently     Comment: rarely    Drug use: No       Review of patient's allergies indicates:   Allergen Reactions    Tramadol Nausea And Vomiting    Codeine Other (See Comments)     Chest pains    Hydrocodone Other (See Comments)     hypotension        Review of Systems:  Negative except as noted above    OBJECTIVE:     Vital Signs Range (Last 24H):  Temp:  [97.7 °F (36.5 °C)-98.2 °F (36.8 °C)]   Pulse:  [70-87]   Resp:  [15-18]   BP: (145-197)/(60-83)   SpO2:  [96 %-100 %]     Physical Exam:  General- NAD noted  HEENT- WNL  Neck- supple  CV- Regular rate and rhythm  Resp- Lungs CTA Bilaterally, No increased WOB  GI- Non tender/non-distended, BS normoactive x4 quads  Extrem- No cyanosis, clubbing, edema.  Derm- skin w/d  Neuro-  No flap.     Body mass index is 22.89 kg/m².    Laboratory:  CBC:   Recent Labs   Lab 08/31/22  0522   WBC 6.19   RBC 3.25*   HGB 9.2*   HCT 27.1*      MCV 83   MCH 28.3   MCHC 33.9       CMP:   Recent Labs   Lab 08/26/22  1529 08/29/22  0610 08/31/22  0522   *    < > 135*   CALCIUM 8.4*   < > 8.1*   ALBUMIN 3.8  --   --    PROT 7.3  --   --    *   < > 129*   K 3.4*   < > 3.4*   CO2 28   < > 29   CL 94*   < > 93*   BUN 18   < > 7*   CREATININE 1.0   < > 0.7   ALKPHOS 98  --   --    ALT 16  --   --    AST 17  --   --    BILITOT 1.1*  --   --     < > = values in this interval not displayed.         Diagnostic Results:  Labs: Reviewed    ASSESSMENT/PLAN:     Active Hospital Problems    Diagnosis  POA    *Malignant neoplasm of ascending colon [C18.2]  Yes    Hyponatremia [E87.1]  Yes    Hypertensive urgency [I16.0]  Yes    Iron deficiency [E61.1]  Yes    Type 2 diabetes mellitus without complication [E11.9]  Yes      Resolved Hospital Problems   No resolved problems to display.         Thank you for allowing us to participate in the care of your patient. We will follow the patient and provide recommendations as needed.      Time spent seeing patient( greater than 1/2 spent in direct contact) :    Jocelyne Cedeno NP

## 2022-08-31 NOTE — CONSULTS
[facility] Medicine    Progress Note    Patient Name: Pebbles Boyle  MRN: [id number]  Patient Class: IP- Inpatient   Admission Date: 8/29/2022  5:33 AM  Length of Stay: 2  Attending Physician: Annabel Wiley MD  Primary Care Provider: Janeth Quezada NP  Face-to-Face encounter date: 08/31/2022      Subjective:    Chief Complaint:  Status post right hemicolectomy    Principal problem:  Status post right hemicolectomy    HPI:  71-year-old  female with history of diet-controlled diabetes and uncontrolled hypertension just had right hemicolectomy on account of malignant neoplasm of ascending colon.  Hospital Medicine consulted for medical management.  Patient is currently status post surgery and is currently sleeping and her 2 daughters are by bedside given history.  They state that patient has had uncontrolled hypertension despite multiple many medications adjustment done by her cardiologist Dr Zuniga.  She denies any headaches, blurring of vision, weakness of extremities, or chest pain.  Her diabetes is mainly controlled with oral metformin and diet.  Patient had been noticing blood in her stool and she is found to have colon cancer hence the surgery.  She smokes 6 cigarettes daily and she states that she is currently weaning off.  She does not drink alcohol or use any other illicit medication.    Interval History:   8/29: Patient is currently doing well postop and pain is controlled.  Blood pressure uncontrolled, will place patient on p.r.n. hydralazine, stop daily Lopressor and start patient on labetalol 200 mg b.i.d. resume losartan HCTZ and terazosin substituted for doxazosin.  Check hemoglobin and iron levels. Family present at bedside.No concerns/issues overnight reported by the patient or the nursing staff.    8/30:  Blood pressure improving I will continue to titrate labetalol upwards as needed.  Drop in hemoglobin from 10.1-8.9, will continue to monitor his H&H.   Iron low at 26, replaced through IV ferrlicit.  Review of patient's chart shows that patient has chronic hyponatremia which could be due to the HCTZ patient takes.  However daughter states that patient cant come off  HCTZ because she needs it is due to fluid buildup.  Although patient is asymptomatic, would consult Nephrology for recommendations.  Patient complains of bladder spasms, DC [place] and start patient on oxybutynin. She states she has not had any bowel movements or flatus, PT OT to begin therapy.     8/31:  Nephrologist are patient and give recommendations and HCTZ discontinued.  Recs appreciated.  H&H holding steady and blood pressure improved.  Will increase labetalol to 200 mg t.i.d. patient complains of poor appetite, would have dietary eval.  Potassium will be replaced.  Patient is very sleepy, will wean pain medications    PAST MEDICAL HISTORY:     Past Medical History:   Diagnosis Date    Arthritis     Diabetes mellitus     DVT complicating pregnancy     1970?    Endometrial cancer     Full dentures     Hypertension     Macular degeneration     Osteoporosis     PVC (premature ventricular contraction)     Squamous cell carcinoma of skin        PAST SURGICAL HISTORY:     Past Surgical History:   Procedure Laterality Date    Bilateral Tubal ligation      CARDIAC CATHETERIZATION  2014    Dr. womack   -    negative    EYE SURGERY  1987    RK    GALLBLADDER SURGERY      HAND ASSISTED LAPAROSCOPY N/A 8/29/2022    Procedure: LAPAROSCOPY, HAND- ASSISTED;  Surgeon: Pranav Mccloud III, MD;  Location: [facility] OR;  Service: General;  Laterality: N/A;    LAPAROSCOPIC RIGHT COLON RESECTION  8/29/2022    Procedure: COLECTOMY, RIGHT, LAPAROSCOPIC;  Surgeon: Pranav Mccloud III, MD;  Location: [facility] OR;  Service: General;;    LITHOTRIPSY      PARTIAL NEPHRECTOMY Right 2016    TONSILLECTOMY         ALLERGIES:   Tramadol, Codeine, and Hydrocodone    FAMILY HISTORY:     Family History   Problem Relation Age of Onset     Breast cancer Sister     Melanoma Neg Hx     Psoriasis Neg Hx     Lupus Neg Hx     Eczema Neg Hx      Reviewed and non- contributory   SOCIAL HISTORY:     Social History     Tobacco Use    Smoking status: Every Day     Packs/day: 0.25     Years: 59.00     Pack years: 14.75     Types: Cigarettes    Smokeless tobacco: Never   Substance Use Topics    Alcohol use: Not Currently     Comment: rarely        Social History     Substance and Sexual Activity   Sexual Activity Not on file        HOME MEDICATIONS:     Prior to Admission medications    Medication Sig Start Date End Date Taking? Authorizing Provider   ANTIOX #8/OM3/DHA/EPA/LUT/ZEAX (PRESERVISION AREDS 2, OMEGA-3, ORAL) Take 1 capsule by mouth 2 (two) times a day.   Yes Historical Provider   cloNIDine (CATAPRES) 0.1 MG tablet Take 1 tablet (0.1 mg total) by mouth 2 (two) times daily. Hold if heart rate < 60/min and SBP < 100 mmHg.  Patient taking differently: Take 0.1 mg by mouth 2 (two) times daily as needed. Hold if heart rate < 60/min and SBP < 100 mmHg. 8/12/16 8/29/22 Yes Leyda Montyoa MD   cyanocobalamin (VITAMIN B-12) 1000 MCG tablet Take 1,000 mcg by mouth once daily.   Yes Historical Provider   FEROSUL 325 mg (65 mg iron) Tab tablet Take by mouth every other day. 6/16/22  Yes Historical Provider   losartan-hydrochlorothiazide 100-12.5 mg (HYZAAR) 100-12.5 mg Tab Take 1 tablet by mouth once daily.   Yes Historical Provider   metoprolol tartrate (LOPRESSOR) 100 MG tablet Take 100 mg by mouth once daily.   Yes Historical Provider   pantoprazole (PROTONIX) 40 MG tablet Take 40 mg by mouth once daily.   Yes Historical Provider   terazosin (HYTRIN) 1 MG capsule Take 2 mg by mouth every evening.   Yes Historical Provider   vitamin D (VITAMIN D3) 1000 units Tab Take 1,000 Units by mouth once daily.   Yes Historical Provider   vitamins  A,C,E-zinc-copper 14,320-226-200 unit-mg-unit Cap Take 1 capsule by mouth as needed.   Yes Historical Provider   albuterol  "(PROVENTIL/VENTOLIN HFA) 90 mcg/actuation inhaler inhale 2 puff by inhalation route  every 4 - 6 hours as needed as needed 3/16/18   Historical Provider   clonazePAM (KLONOPIN) 0.5 MG tablet Take 0.5 mg by mouth 2 (two) times daily as needed for Anxiety.    Historical Provider   cyclobenzaprine (FLEXERIL) 10 MG tablet Take 10 mg by mouth 3 (three) times daily as needed for Muscle spasms.    Historical Provider       REVIEW OF SYSTEMS:   10 Point Review of System was performed and was found to be negative except for that mentioned already in the HPI above.     PHYSICAL EXAM:   BP (!) 164/65   Pulse 87   Temp 98.1 °F (36.7 °C) (Oral)   Resp 15   Ht 5' 4" (1.626 m)   Wt 60.5 kg (133 lb 6.1 oz)   LMP  (LMP Unknown)   SpO2 99%   Breastfeeding No   BMI 22.89 kg/m²     Vitals Reviewed  General appearance: Well-developed, well-nourished female in no apparent distress.  HENT: Atraumatic head. Moist mucous membranes of oral cavity.  Eyes: Normal extraocular movements.   Neck: Supple.   Lungs: Clear to auscultation bilaterally. No wheezing present.   Heart: Regular rate and rhythm. S1 and S2 present with no murmurs/gallop/rub. No pedal edema. No JVD present.   Abdomen: Soft, non-distended, non-tender. No rebound tenderness/guarding. Bowel sounds are normal.   Extremities: No cyanosis, clubbing, or edema.  Skin: No Rash.   Neuro: Motor and sensory exams grossly intact. Good tone. Muscle strength 5/5 in all 4 extremities  Psych/mental status: Appropriate mood and affect. Responds appropriately to questions.       Following labs were Reviewed   CBC:  Recent Labs   Lab 08/31/22 0522   WBC 6.19   HGB 9.2*   HCT 27.1*        CMP:  Recent Labs   Lab 08/31/22 0522   CALCIUM 8.1*   *   K 3.4*   CO2 29   CL 93*   BUN 7*   CREATININE 0.7       Micro Results  Microbiology Results (last 7 days)       ** No results found for the last 168 hours. **             Radiology Reports  X-Ray Chest PA And Lateral  Result " Date: 8/26/2022  IMPRESSION: No acute cardiopulmonary abnormality. Electronically signed by:  Zander Jimenez MD  8/26/2022 3:44 PM CDT Workstation: [id number]         Meds  Scheduled Meds:   doxazosin  2 mg Oral QHS    enoxaparin  40 mg Subcutaneous Daily    labetaloL  200 mg Oral TID    losartan  100 mg Oral Daily    mupirocin  1 g Nasal BID    nicotine  1 patch Transdermal Daily    oxybutynin  5 mg Oral Daily    pantoprazole  40 mg Oral Daily    polyethylene glycol  17 g Oral Daily    simethicone  1 tablet Oral TID PC     Continuous Infusions:   sodium chloride 0.9%       PRN Meds:.acetaminophen, calcium chloride IVPB, calcium chloride IVPB, calcium chloride IVPB, clonazePAM, dextrose 10%, dextrose 10%, dextrose 50%, glucagon (human recombinant), hydrALAZINE, HYDROmorphone, insulin aspart U-100, labetalol, magnesium oxide, magnesium sulfate IVPB, magnesium sulfate IVPB, magnesium sulfate IVPB, magnesium sulfate IVPB, ondansetron, oxyCODONE-acetaminophen, potassium chloride in water, potassium chloride in water, potassium chloride in water, potassium chloride in water, potassium chloride, potassium chloride, potassium chloride, potassium chloride, sodium chloride 0.9%, sodium phosphate IVPB, sodium phosphate IVPB, sodium phosphate IVPB, sodium phosphate IVPB, sodium phosphate IVPB.    Assessment & Plan:     Active Hospital Problems    Diagnosis    *Malignant neoplasm of ascending colon  Status post right nahomy colectomy D 2  Continue to monitor H&H closely  Surgery on board  iron replacedwith IV ferrlicit  Currently on liquid diet      Hyponatremia  Chronic  Secondary to HCTZ use  HCTZ discontinued   nephrology on board      Hypertensive urgency  Improving  Continue losartan HCTZ , titrate upwards labetalol 200 mg t.i.d. as needed  Continue doxazosin      Type 2 diabetes mellitus without complication  Insulin sliding scale  Regular Accu-Chek         Thank you for your consult. I will follow-up with patient. Please  contact us if you have any additional questions  Annabel Wiley MD  Department of Hospital Medicine   [facility]

## 2022-08-31 NOTE — PT/OT/SLP PROGRESS
Physical Therapy Treatment    Patient Name:  Pebbles Boyle   MRN:  [id number]    Recommendations:     Discharge Recommendations:  home   Discharge Equipment Recommendations:  (Pt may require a RW for home depending on progress during therapy.)   Barriers to discharge: None    Assessment:     Pebbles Boyle is a 71 y.o. female admitted with a medical diagnosis of Malignant neoplasm of ascending colon.  She presents with the following impairments/functional limitations:  weakness, impaired endurance, gait instability, impaired functional mobility, impaired cardiopulmonary response to activity .    Rehab Prognosis: Good; patient would benefit from acute skilled PT services to address these deficits and reach maximum level of function.    Recent Surgery: Procedure(s) (LRB):  LAPAROSCOPY, HAND- ASSISTED (N/A)  COLECTOMY, RIGHT, LAPAROSCOPIC 2 Days Post-Op    Plan:     During this hospitalization, patient to be seen 5 x/week to address the identified rehab impairments via gait training, therapeutic activities, therapeutic exercises and progress toward the following goals:    Plan of Care Expires:  09/30/22    Subjective     Chief Complaint: post op pain with movement  Patient/Family Comments/goals: home with her daughter at D/C and ambulation w/o an AD  Pain/Comfort:         Objective:     Communicated with nurse prior to session.  Patient found supine with telemetry, bed alarm upon PT entry to room.     General Precautions: Standard, fall   Orthopedic Precautions:    Braces:    Respiratory Status: Room air     Functional Mobility:  Bed Mobility:     Supine to Sit: contact guard assistance  Sit to Supine: stand by assistance  Transfers:     Sit to Stand:  contact guard assistance with hand-held assist  Gait: 100 ft x2  CGA no AD      AM-PAC 6 CLICK MOBILITY          Therapeutic Activities and Exercises:   Bed mobility, t/f, and gait training    Patient left supine with all lines intact, call button in reach, and bed  alarm on..    GOALS:   Multidisciplinary Problems       Physical Therapy Goals          Problem: Physical Therapy    Goal Priority Disciplines Outcome Goal Variances Interventions   Physical Therapy Goal     PT, PT/OT      Description: Goals to be met by: 2022    Patient will increase functional independence with mobility by performin. Supine to sit with Stand-by Assistance  2. Sit to stand transfer with Stand-by Assistance  3. Gait  x 100  feet with Stand-by Assistance using No Assistive Device.                          Time Tracking:     PT Received On: 22  PT Start Time: 1415     PT Stop Time: 1428  PT Total Time (min): 13 min     Billable Minutes: Gait Training 13 minutes    Treatment Type: Treatment  PT/PTA: PT           2022

## 2022-08-31 NOTE — PLAN OF CARE
Pt remained free of injuries, falls, and trauma. VS stable. No complaints of sob. Pt on RA with O2 sats above 96%. Pt in NSR on telemetry with HR between 60s to 90s.  Pt ambulated with PT in love. Plan of care reviewed with pt. Pt verbalized understanding. Will continue to monitor.

## 2022-08-31 NOTE — CARE UPDATE
08/31/22 0723   PRE-TX-O2   O2 Device (Oxygen Therapy) nasal cannula   $ Is the patient on Low Flow Oxygen? Yes   Flow (L/min) 2   SpO2 99 %   Pulse Oximetry Type Intermittent   $ Pulse Oximetry - Multiple Charge Pulse Oximetry - Multiple   Pulse 87   Resp 15   Respiratory Evaluation   $ Care Plan Tech Time 15 min

## 2022-09-01 LAB
ANION GAP SERPL CALC-SCNC: 5 MMOL/L (ref 8–16)
BUN SERPL-MCNC: 10 MG/DL (ref 8–23)
CALCIUM SERPL-MCNC: 8.3 MG/DL (ref 8.7–10.5)
CHLORIDE SERPL-SCNC: 96 MMOL/L (ref 95–110)
CO2 SERPL-SCNC: 29 MMOL/L (ref 23–29)
CREAT SERPL-MCNC: 0.7 MG/DL (ref 0.5–1.4)
ERYTHROCYTE [DISTWIDTH] IN BLOOD BY AUTOMATED COUNT: 13.8 % (ref 11.5–14.5)
EST. GFR  (NO RACE VARIABLE): >60 ML/MIN/1.73 M^2
GLUCOSE SERPL-MCNC: 122 MG/DL (ref 70–110)
GLUCOSE SERPL-MCNC: 122 MG/DL (ref 70–110)
GLUCOSE SERPL-MCNC: 155 MG/DL (ref 70–110)
GLUCOSE SERPL-MCNC: 171 MG/DL (ref 70–110)
HCT VFR BLD AUTO: 27.7 % (ref 37–48.5)
HGB BLD-MCNC: 9.4 G/DL (ref 12–16)
MAGNESIUM SERPL-MCNC: 1.8 MG/DL (ref 1.6–2.6)
MCH RBC QN AUTO: 28.2 PG (ref 27–31)
MCHC RBC AUTO-ENTMCNC: 33.9 G/DL (ref 32–36)
MCV RBC AUTO: 83 FL (ref 82–98)
PLATELET # BLD AUTO: 182 K/UL (ref 150–450)
PMV BLD AUTO: 9 FL (ref 9.2–12.9)
POTASSIUM SERPL-SCNC: 4 MMOL/L (ref 3.5–5.1)
RBC # BLD AUTO: 3.33 M/UL (ref 4–5.4)
SODIUM SERPL-SCNC: 130 MMOL/L (ref 136–145)
WBC # BLD AUTO: 6.38 K/UL (ref 3.9–12.7)

## 2022-09-01 PROCEDURE — 99900035 HC TECH TIME PER 15 MIN (STAT)

## 2022-09-01 PROCEDURE — 85027 COMPLETE CBC AUTOMATED: CPT | Performed by: SURGERY

## 2022-09-01 PROCEDURE — 25000003 PHARM REV CODE 250: Performed by: STUDENT IN AN ORGANIZED HEALTH CARE EDUCATION/TRAINING PROGRAM

## 2022-09-01 PROCEDURE — 12000002 HC ACUTE/MED SURGE SEMI-PRIVATE ROOM

## 2022-09-01 PROCEDURE — 27000221 HC OXYGEN, UP TO 24 HOURS

## 2022-09-01 PROCEDURE — S4991 NICOTINE PATCH NONLEGEND: HCPCS | Performed by: SURGERY

## 2022-09-01 PROCEDURE — 94761 N-INVAS EAR/PLS OXIMETRY MLT: CPT

## 2022-09-01 PROCEDURE — 97116 GAIT TRAINING THERAPY: CPT | Mod: CQ

## 2022-09-01 PROCEDURE — 80048 BASIC METABOLIC PNL TOTAL CA: CPT | Performed by: SURGERY

## 2022-09-01 PROCEDURE — 83735 ASSAY OF MAGNESIUM: CPT | Performed by: STUDENT IN AN ORGANIZED HEALTH CARE EDUCATION/TRAINING PROGRAM

## 2022-09-01 PROCEDURE — 36415 COLL VENOUS BLD VENIPUNCTURE: CPT | Performed by: SURGERY

## 2022-09-01 PROCEDURE — 94799 UNLISTED PULMONARY SVC/PX: CPT

## 2022-09-01 PROCEDURE — 63600175 PHARM REV CODE 636 W HCPCS: Performed by: SURGERY

## 2022-09-01 PROCEDURE — 25000003 PHARM REV CODE 250: Performed by: SURGERY

## 2022-09-01 PROCEDURE — 25000003 PHARM REV CODE 250: Performed by: INTERNAL MEDICINE

## 2022-09-01 RX ORDER — FUROSEMIDE 20 MG/1
20 TABLET ORAL DAILY
Status: DISCONTINUED | OUTPATIENT
Start: 2022-09-01 | End: 2022-09-02 | Stop reason: HOSPADM

## 2022-09-01 RX ADMIN — DOXAZOSIN 2 MG: 1 TABLET ORAL at 08:09

## 2022-09-01 RX ADMIN — MUPIROCIN 1 G: 20 OINTMENT TOPICAL at 08:09

## 2022-09-01 RX ADMIN — SODIUM CHLORIDE TAB 1 GM 1 G: 1 TAB at 12:09

## 2022-09-01 RX ADMIN — ENOXAPARIN SODIUM 40 MG: 40 INJECTION SUBCUTANEOUS at 04:09

## 2022-09-01 RX ADMIN — NICOTINE 1 PATCH: 7 PATCH, EXTENDED RELEASE TRANSDERMAL at 09:09

## 2022-09-01 RX ADMIN — PANTOPRAZOLE SODIUM 40 MG: 40 TABLET, DELAYED RELEASE ORAL at 06:09

## 2022-09-01 RX ADMIN — LABETALOL HYDROCHLORIDE 200 MG: 100 TABLET, FILM COATED ORAL at 08:09

## 2022-09-01 RX ADMIN — SODIUM CHLORIDE TAB 1 GM 1 G: 1 TAB at 08:09

## 2022-09-01 RX ADMIN — SIMETHICONE 80 MG: 80 TABLET, CHEWABLE ORAL at 08:09

## 2022-09-01 RX ADMIN — FUROSEMIDE 20 MG: 20 TABLET ORAL at 12:09

## 2022-09-01 RX ADMIN — OXYCODONE AND ACETAMINOPHEN 1 TABLET: 5; 325 TABLET ORAL at 12:09

## 2022-09-01 RX ADMIN — LABETALOL HYDROCHLORIDE 200 MG: 100 TABLET, FILM COATED ORAL at 02:09

## 2022-09-01 RX ADMIN — POLYETHYLENE GLYCOL 3350 17 G: 17 POWDER, FOR SOLUTION ORAL at 08:09

## 2022-09-01 RX ADMIN — LOSARTAN POTASSIUM 100 MG: 50 TABLET, FILM COATED ORAL at 08:09

## 2022-09-01 RX ADMIN — OXYBUTYNIN CHLORIDE 5 MG: 5 TABLET, EXTENDED RELEASE ORAL at 08:09

## 2022-09-01 RX ADMIN — OXYCODONE AND ACETAMINOPHEN 1 TABLET: 5; 325 TABLET ORAL at 08:09

## 2022-09-01 RX ADMIN — SIMETHICONE 80 MG: 80 TABLET, CHEWABLE ORAL at 02:09

## 2022-09-01 RX ADMIN — OXYCODONE AND ACETAMINOPHEN 1 TABLET: 5; 325 TABLET ORAL at 02:09

## 2022-09-01 NOTE — CARE UPDATE
09/01/22 0817   PRE-TX-O2   O2 Device (Oxygen Therapy) nasal cannula   $ Is the patient on Low Flow Oxygen? Yes   SpO2 98 %   Pulse Oximetry Type Intermittent   $ Pulse Oximetry - Multiple Charge Pulse Oximetry - Multiple   Pulse 75   Resp 18   Respiratory Evaluation   $ Care Plan Tech Time 15 min

## 2022-09-01 NOTE — PLAN OF CARE
Problem: Adult Inpatient Plan of Care  Goal: Plan of Care Review  Outcome: Ongoing, Progressing  Goal: Patient-Specific Goal (Individualized)  Outcome: Ongoing, Progressing  Goal: Absence of Hospital-Acquired Illness or Injury  Outcome: Ongoing, Progressing  Goal: Optimal Comfort and Wellbeing  Outcome: Ongoing, Progressing  Goal: Readiness for Transition of Care  Outcome: Ongoing, Progressing     Problem: Diabetes Comorbidity  Goal: Blood Glucose Level Within Targeted Range  Outcome: Ongoing, Progressing     Problem: Infection  Goal: Absence of Infection Signs and Symptoms  Outcome: Ongoing, Progressing     Problem: Fall Injury Risk  Goal: Absence of Fall and Fall-Related Injury  Outcome: Ongoing, Progressing     Problem: Oral Intake Inadequate  Goal: Improved Oral Intake  Outcome: Ongoing, Progressing

## 2022-09-01 NOTE — SUBJECTIVE & OBJECTIVE
Past Medical History:   Diagnosis Date    Arthritis     Diabetes mellitus     DVT complicating pregnancy     1970?    Endometrial cancer     Full dentures     Hypertension     Macular degeneration     Osteoporosis     PVC (premature ventricular contraction)     Squamous cell carcinoma of skin        Past Surgical History:   Procedure Laterality Date    Bilateral Tubal ligation      CARDIAC CATHETERIZATION  2014    Dr. womack   -    negative    EYE SURGERY  1987    [facility]    GALLBLADDER SURGERY      HAND ASSISTED LAPAROSCOPY N/A 8/29/2022    Procedure: LAPAROSCOPY, HAND- ASSISTED;  Surgeon: Pranav Mccloud III, MD;  Location: [facility] OR;  Service: General;  Laterality: N/A;    LAPAROSCOPIC RIGHT COLON RESECTION  8/29/2022    Procedure: COLECTOMY, RIGHT, LAPAROSCOPIC;  Surgeon: Pranav Mccloud III, MD;  Location: [facility] OR;  Service: General;;    LITHOTRIPSY      PARTIAL NEPHRECTOMY Right 2016    TONSILLECTOMY         Review of patient's allergies indicates:   Allergen Reactions    Tramadol Nausea And Vomiting    Codeine Other (See Comments)     Chest pains    Hydrocodone Other (See Comments)     hypotension       No current facility-administered medications on file prior to encounter.     Current Outpatient Medications on File Prior to Encounter   Medication Sig    ANTIOX #8/OM3/DHA/EPA/LUT/ZEAX (PRESERVISION AREDS 2, OMEGA-3, ORAL) Take 1 capsule by mouth 2 (two) times a day.    cloNIDine (CATAPRES) 0.1 MG tablet Take 1 tablet (0.1 mg total) by mouth 2 (two) times daily. Hold if heart rate < 60/min and SBP < 100 mmHg. (Patient taking differently: Take 0.1 mg by mouth 2 (two) times daily as needed. Hold if heart rate < 60/min and SBP < 100 mmHg.)    FEROSUL 325 mg (65 mg iron) Tab tablet Take by mouth every other day.    metoprolol tartrate (LOPRESSOR) 100 MG tablet Take 100 mg by mouth once daily.    terazosin (HYTRIN) 1 MG capsule Take 2 mg by mouth every evening.    vitamins  A,C,E-zinc-copper [id number]  unit-mg-unit Cap Take 1 capsule by mouth as needed.    albuterol (PROVENTIL/VENTOLIN HFA) 90 mcg/actuation inhaler inhale 2 puff by inhalation route  every 4 - 6 hours as needed as needed    clonazePAM (KLONOPIN) 0.5 MG tablet Take 0.5 mg by mouth 2 (two) times daily as needed for Anxiety.    cyclobenzaprine (FLEXERIL) 10 MG tablet Take 10 mg by mouth 3 (three) times daily as needed for Muscle spasms.     Family History       Problem Relation (Age of Onset)    Breast cancer Sister          Tobacco Use    Smoking status: Every Day     Packs/day: 0.25     Years: 59.00     Pack years: 14.75     Types: Cigarettes    Smokeless tobacco: Never   Substance and Sexual Activity    Alcohol use: Not Currently     Comment: rarely    Drug use: No    Sexual activity: Not on file     Review of Systems   Constitutional:  Positive for appetite change. Negative for fever.   Respiratory:  Negative for shortness of breath.    Cardiovascular:  Negative for palpitations.   Gastrointestinal:  Positive for abdominal pain. Negative for constipation and diarrhea.   Neurological:  Negative for syncope.   All other systems reviewed and are negative.  Objective:     Vital Signs (Most Recent):  Temp: 98.7 °F (37.1 °C) (09/01/22 1154)  Pulse: 67 (09/01/22 1154)  Resp: 18 (09/01/22 1154)  BP: (!) 166/80 (09/01/22 1154)  SpO2: 97 % (09/01/22 1154)   Vital Signs (24h Range):  Temp:  [97.9 °F (36.6 °C)-98.7 °F (37.1 °C)] 98.7 °F (37.1 °C)  Pulse:  [67-76] 67  Resp:  [17-18] 18  SpO2:  [96 %-99 %] 97 %  BP: (161-191)/(64-90) 166/80     Weight: 60.5 kg (133 lb 6.1 oz)  Body mass index is 22.89 kg/m².    Physical Exam  Vitals and nursing note reviewed.   Constitutional:       Appearance: Normal appearance.   HENT:      Head: Normocephalic.      Mouth/Throat:      Mouth: Mucous membranes are moist.   Eyes:      Pupils: Pupils are equal, round, and reactive to light.   Cardiovascular:      Rate and Rhythm: Normal rate.      Pulses: Normal pulses.    Pulmonary:      Effort: Pulmonary effort is normal.   Abdominal:      General: Bowel sounds are normal.      Tenderness: There is abdominal tenderness.   Musculoskeletal:         General: Normal range of motion.   Skin:     General: Skin is warm.   Neurological:      General: No focal deficit present.      Mental Status: She is alert. Mental status is at baseline.   Psychiatric:         Mood and Affect: Mood normal.       Significant Labs: All pertinent labs within the past 24 hours have been reviewed.  Blood Culture: No results for input(s): LABBLOO in the last 48 hours.  BMP:   Recent Labs   Lab 09/01/22  0459   *   *   K 4.0   CL 96   CO2 29   BUN 10   CREATININE 0.7   CALCIUM 8.3*   MG 1.8     CBC:   Recent Labs   Lab 08/31/22 0522 09/01/22 0459   WBC 6.19 6.38   HGB 9.2* 9.4*   HCT 27.1* 27.7*    182     CMP:   Recent Labs   Lab 08/31/22 0522 09/01/22  0459   * 130*   K 3.4* 4.0   CL 93* 96   CO2 29 29   * 122*   BUN 7* 10   CREATININE 0.7 0.7   CALCIUM 8.1* 8.3*   ANIONGAP 7* 5*     Magnesium:   Recent Labs   Lab 08/31/22 0522 09/01/22 0459   MG 1.6 1.8       Significant Imaging: I have reviewed all pertinent imaging results/findings within the past 24 hours.

## 2022-09-01 NOTE — ASSESSMENT & PLAN NOTE
Patient's FSGs are controlled on current medication regimen.  Last A1c reviewed-   Lab Results   Component Value Date    HGBA1C 5.9 08/29/2022     Most recent fingerstick glucose reviewed- No results for input(s): POCTGLUCOSE in the last 24 hours.  Current correctional scale  Medium  Maintain anti-hyperglycemic dose as follows-   Antihyperglycemics (From admission, onward)    Start     Stop Route Frequency Ordered    08/29/22 1655  insulin aspart U-100 pen 0-5 Units         -- SubQ Every 6 hours PRN 08/29/22 1556        Hold Oral hypoglycemics while patient is in the hospital.

## 2022-09-01 NOTE — PLAN OF CARE
[facility]  Discharge Reassessment    Primary Care Provider: Janeth Quezada NP    Expected Discharge Date: 9/5/2022     met with Pt at bedside to discuss discharge plans. Pt verbalized plans to return home via family transport. Per PT/OT note, Pt may require rolling walker at discharge.     Reassessment (most recent)       Discharge Reassessment - 09/01/22 1546          Discharge Reassessment    Assessment Type Discharge Planning Reassessment     Did the patient's condition or plan change since previous assessment? No     Discharge Plan discussed with: Patient     Communicated QUINTEN with patient/caregiver Yes     Discharge Plan A Home with family     Discharge Plan B Home with family     DME Needed Upon Discharge  walker, rolling     Discharge Barriers Identified None        Post-Acute Status    Discharge Delays None known at this time

## 2022-09-01 NOTE — PROGRESS NOTES
[facility] Hospital Medicine  Progress Note    Patient Name: Pebbles Boyle  MRN: [id number]  Patient Class: IP- Inpatient   Admission Date: 8/29/2022  Length of Stay: 3 days  Attending Physician: Pranav Mccloud III, *  Primary Care Provider: Janeth Quezada NP        Subjective:     Principal Problem:Malignant neoplasm of ascending colon        HPI:  71-year-old  female with history of diet-controlled diabetes and uncontrolled hypertension just had right hemicolectomy on account of malignant neoplasm of ascending colon.  Hospital Medicine consulted for medical management.  Patient is currently status post surgery and is currently sleeping and her 2 daughters are by bedside given history.  They state that patient has had uncontrolled hypertension despite multiple many medications adjustment done by her cardiologist Dr Zuniga.  She denies any headaches, blurring of vision, weakness of extremities, or chest pain.  Her diabetes is mainly controlled with oral metformin and diet.  Patient had been noticing blood in her stool and she is found to have colon cancer hence the surgery.  She smokes 6 cigarettes daily and she states that she is currently weaning off.  She does not drink alcohol or use any other illicit medication.        Overview/Hospital Course:  8/29: Patient is currently doing well postop and pain is controlled.  Blood pressure uncontrolled, will place patient on p.r.n. hydralazine, stop daily Lopressor and start patient on labetalol 200 mg b.i.d. resume losartan HCTZ and terazosin substituted for doxazosin.  Check hemoglobin and iron levels. Family present at bedside.No concerns/issues overnight reported by the patient or the nursing staff.     8/30:  Blood pressure improving I will continue to titrate labetalol upwards as needed.  Drop in hemoglobin from 10.1-8.9, will continue to monitor his H&H.  Iron low at 26, replaced through IV ferrlicit.  Review of patient's chart  shows that patient has chronic hyponatremia which could be due to the HCTZ patient takes.  However daughter states that patient cant come off  HCTZ because she needs it is due to fluid buildup.  Although patient is asymptomatic, would consult Nephrology for recommendations.  Patient complains of bladder spasms, DC [place] and start patient on oxybutynin. She states she has not had any bowel movements or flatus, PT OT to begin therapy.      8/31:  Nephrologist are patient and give recommendations and HCTZ discontinued.  Recs appreciated.  H&H holding steady and blood pressure improved.  Will increase labetalol to 200 mg t.i.d. patient complains of poor appetite, would have dietary eval.  Potassium will be replaced.  Patient is very sleepy, will wean pain medications    9/1:  Case discussed with surgeon in the room, patient still has some abdominal bloating.  Would resume IV pain medicine at a lower dose.  Continue to advance diet as tolerated.  Sodium improving.      Past Medical History:   Diagnosis Date    Arthritis     Diabetes mellitus     DVT complicating pregnancy     1970?    Endometrial cancer     Full dentures     Hypertension     Macular degeneration     Osteoporosis     PVC (premature ventricular contraction)     Squamous cell carcinoma of skin        Past Surgical History:   Procedure Laterality Date    Bilateral Tubal ligation      CARDIAC CATHETERIZATION  2014    Dr. womack   -    negative    EYE SURGERY  1987    [facility]    GALLBLADDER SURGERY      HAND ASSISTED LAPAROSCOPY N/A 8/29/2022    Procedure: LAPAROSCOPY, HAND- ASSISTED;  Surgeon: Pranav Mccloud III, MD;  Location: [facility] OR;  Service: General;  Laterality: N/A;    LAPAROSCOPIC RIGHT COLON RESECTION  8/29/2022    Procedure: COLECTOMY, RIGHT, LAPAROSCOPIC;  Surgeon: Pranav Mccloud III, MD;  Location: [facility] OR;  Service: General;;    LITHOTRIPSY      PARTIAL NEPHRECTOMY Right 2016    TONSILLECTOMY         Review of patient's  allergies indicates:   Allergen Reactions    Tramadol Nausea And Vomiting    Codeine Other (See Comments)     Chest pains    Hydrocodone Other (See Comments)     hypotension       No current facility-administered medications on file prior to encounter.     Current Outpatient Medications on File Prior to Encounter   Medication Sig    ANTIOX #8/OM3/DHA/EPA/LUT/ZEAX (PRESERVISION AREDS 2, OMEGA-3, ORAL) Take 1 capsule by mouth 2 (two) times a day.    cloNIDine (CATAPRES) 0.1 MG tablet Take 1 tablet (0.1 mg total) by mouth 2 (two) times daily. Hold if heart rate < 60/min and SBP < 100 mmHg. (Patient taking differently: Take 0.1 mg by mouth 2 (two) times daily as needed. Hold if heart rate < 60/min and SBP < 100 mmHg.)    FEROSUL 325 mg (65 mg iron) Tab tablet Take by mouth every other day.    metoprolol tartrate (LOPRESSOR) 100 MG tablet Take 100 mg by mouth once daily.    terazosin (HYTRIN) 1 MG capsule Take 2 mg by mouth every evening.    vitamins  A,C,E-zinc-copper [id number] unit-mg-unit Cap Take 1 capsule by mouth as needed.    albuterol (PROVENTIL/VENTOLIN HFA) 90 mcg/actuation inhaler inhale 2 puff by inhalation route  every 4 - 6 hours as needed as needed    clonazePAM (KLONOPIN) 0.5 MG tablet Take 0.5 mg by mouth 2 (two) times daily as needed for Anxiety.    cyclobenzaprine (FLEXERIL) 10 MG tablet Take 10 mg by mouth 3 (three) times daily as needed for Muscle spasms.     Family History       Problem Relation (Age of Onset)    Breast cancer Sister          Tobacco Use    Smoking status: Every Day     Packs/day: 0.25     Years: 59.00     Pack years: 14.75     Types: Cigarettes    Smokeless tobacco: Never   Substance and Sexual Activity    Alcohol use: Not Currently     Comment: rarely    Drug use: No    Sexual activity: Not on file     Review of Systems   Constitutional:  Positive for appetite change. Negative for fever.   Respiratory:  Negative for shortness of breath.    Cardiovascular:   Negative for palpitations.   Gastrointestinal:  Positive for abdominal pain. Negative for constipation and diarrhea.   Neurological:  Negative for syncope.   All other systems reviewed and are negative.  Objective:     Vital Signs (Most Recent):  Temp: 98.7 °F (37.1 °C) (09/01/22 1154)  Pulse: 67 (09/01/22 1154)  Resp: 18 (09/01/22 1154)  BP: (!) 166/80 (09/01/22 1154)  SpO2: 97 % (09/01/22 1154)   Vital Signs (24h Range):  Temp:  [97.9 °F (36.6 °C)-98.7 °F (37.1 °C)] 98.7 °F (37.1 °C)  Pulse:  [67-76] 67  Resp:  [17-18] 18  SpO2:  [96 %-99 %] 97 %  BP: (161-191)/(64-90) 166/80     Weight: 60.5 kg (133 lb 6.1 oz)  Body mass index is 22.89 kg/m².    Physical Exam  Vitals and nursing note reviewed.   Constitutional:       Appearance: Normal appearance.   HENT:      Head: Normocephalic.      Mouth/Throat:      Mouth: Mucous membranes are moist.   Eyes:      Pupils: Pupils are equal, round, and reactive to light.   Cardiovascular:      Rate and Rhythm: Normal rate.      Pulses: Normal pulses.   Pulmonary:      Effort: Pulmonary effort is normal.   Abdominal:      General: Bowel sounds are normal.      Tenderness: There is abdominal tenderness.   Musculoskeletal:         General: Normal range of motion.   Skin:     General: Skin is warm.   Neurological:      General: No focal deficit present.      Mental Status: She is alert. Mental status is at baseline.   Psychiatric:         Mood and Affect: Mood normal.       Significant Labs: All pertinent labs within the past 24 hours have been reviewed.  Blood Culture: No results for input(s): LABBLOO in the last 48 hours.  BMP:   Recent Labs   Lab 09/01/22  0459   *   *   K 4.0   CL 96   CO2 29   BUN 10   CREATININE 0.7   CALCIUM 8.3*   MG 1.8     CBC:   Recent Labs   Lab 08/31/22 0522 09/01/22 0459   WBC 6.19 6.38   HGB 9.2* 9.4*   HCT 27.1* 27.7*    182     CMP:   Recent Labs   Lab 08/31/22 0522 09/01/22  0459   * 130*   K 3.4* 4.0   CL 93* 96   CO2  29 29   * 122*   BUN 7* 10   CREATININE 0.7 0.7   CALCIUM 8.1* 8.3*   ANIONGAP 7* 5*     Magnesium:   Recent Labs   Lab 08/31/22  0522 09/01/22  0459   MG 1.6 1.8       Significant Imaging: I have reviewed all pertinent imaging results/findings within the past 24 hours.          Assessment/Plan:      * Malignant neoplasm of ascending colon  Status post right nahomy colectomy D 3  Continue to monitor H&H closely  Surgery on board  iron replacedwith IV ferrlicit  Currently on liquid diet      Hyponatremia  Acute on Chronic  Improving  Secondary to HCTZ use  HCTZ discontinued   nephrology on board    Hypertensive urgency  Patient has a current diagnosis of hypertensive urgency (without evidence of end organ damage) which is uncontrolled.  Latest blood pressure and vitals reviewed-   Temp:  [97.9 °F (36.6 °C)-98.7 °F (37.1 °C)]   Pulse:  [67-76]   Resp:  [17-18]   BP: (161-191)/(64-90)   SpO2:  [96 %-99 %] .   Patient currently off IV antihypertensives.   Home meds for hypertension were reviewed and noted below.   Hypertension Medications             cloNIDine (CATAPRES) 0.1 MG tablet Take 1 tablet (0.1 mg total) by mouth 2 (two) times daily. Hold if heart rate < 60/min and SBP < 100 mmHg.    losartan-hydrochlorothiazide 100-12.5 mg (HYZAAR) 100-12.5 mg Tab Take 1 tablet by mouth once daily.    metoprolol tartrate (LOPRESSOR) 100 MG tablet Take 100 mg by mouth once daily.    terazosin (HYTRIN) 1 MG capsule Take 2 mg by mouth every evening.          Medication adjustment for hospital antihypertensives is as follows- labetalol increased to 200 mg t.i.d. and Lopressor discontinued    Will aim for controlled BP reduction by medications noted above. Monitor and mitigate end organ damage as indicated.    Type 2 diabetes mellitus without complication  Patient's FSGs are controlled on current medication regimen.  Last A1c reviewed-   Lab Results   Component Value Date    HGBA1C 5.9 08/29/2022     Most recent fingerstick  glucose reviewed- No results for input(s): POCTGLUCOSE in the last 24 hours.  Current correctional scale  Medium  Maintain anti-hyperglycemic dose as follows-   Antihyperglycemics (From admission, onward)    Start     Stop Route Frequency Ordered    08/29/22 1655  insulin aspart U-100 pen 0-5 Units         -- SubQ Every 6 hours PRN 08/29/22 1556        Hold Oral hypoglycemics while patient is in the hospital.      VTE Risk Mitigation (From admission, onward)         Ordered     enoxaparin injection 40 mg  Daily         08/29/22 1303     IP VTE HIGH RISK PATIENT  Once         08/29/22 1303     Place sequential compression device  Until discontinued         08/29/22 1303                Discharge Planning   QUINTEN: 9/5/2022     Code Status: Prior   Is the patient medically ready for discharge?:     Reason for patient still in hospital (select all that apply): Patient trending condition and Consult recommendations  Discharge Plan A: Home with family                  Annabel Wiley MD  Department of Hospital Medicine   [facility]

## 2022-09-01 NOTE — CARE UPDATE
09/01/22 0153   Patient Assessment/Suction   Level of Consciousness (AVPU) alert   Respiratory Effort Normal;Unlabored   Expansion/Accessory Muscles/Retractions no use of accessory muscles   Incentive Spirometer   $ Incentive Spirometer Charges done with encouragement;done independently per patient   Incentive Spirometer Predicted Level (mL) 1800   Administration (IS) self-administered  (pt. states she obtains goal of .75-1.0l)   Encourage incentive spirometer

## 2022-09-01 NOTE — PT/OT/SLP PROGRESS
Physical Therapy Treatment    Patient Name:  Pebbles Boyle   MRN:  [id number]    Recommendations:     Discharge Recommendations:  home   Discharge Equipment Recommendations:  (Pt may require a RW for home depending on progress during therapy.)   Barriers to discharge:  increased assist with mobility    Assessment:     Pebbles Boyle is a 71 y.o. female admitted with a medical diagnosis of Malignant neoplasm of ascending colon.  She presents with the following impairments/functional limitations:  weakness, impaired endurance, gait instability, impaired functional mobility, impaired cardiopulmonary response to activity.  Pt with HOB elevated and daughter present. Daughter reports that pt got pain medication about an hour ago. Pt agreeable to ambulation in the hallway. Pt performed bed mobility and transfers with stand by assist and verbal cuing for safety. Pt ambulated 200' with RW and initial contact guard assist progressing to stand by assist. Pt tolerated session well.    Rehab Prognosis: Fair; patient would benefit from acute skilled PT services to address these deficits and reach maximum level of function.    Recent Surgery: Procedure(s) (LRB):  LAPAROSCOPY, HAND- ASSISTED (N/A)  COLECTOMY, RIGHT, LAPAROSCOPIC 3 Days Post-Op    Plan:     During this hospitalization, patient to be seen 5 x/week to address the identified rehab impairments via gait training, therapeutic activities, therapeutic exercises and progress toward the following goals:    Plan of Care Expires:  09/30/22    Subjective     Chief Complaint: none verbalized  Patient/Family Comments/goals: to get better  Pain/Comfort:  Pain Rating 1: 0/10      Objective:     Communicated with RN prior to session.  Patient found HOB elevated with telemetry, peripheral IV upon PT entry to room.     General Precautions: Standard, fall   Orthopedic Precautions:    Braces:    Respiratory Status: Room air     Functional Mobility:  Bed Mobility:     Supine to Sit:  stand by assistance  Sit to Supine: stand by assistance  Transfers:     Sit to Stand:  stand by assistance with no AD  Gait: x 200' with RW and CGA progressing to SBA      AM-PAC 6 CLICK MOBILITY          Therapeutic Activities and Exercises:   Pt educated on importance of time OOB, importance of intermittent mobility, safe techniques for transfers/ambulation, discharge recommendations/options, and use of call light for assistance and fall prevention.      Patient left HOB elevated with all lines intact, call button in reach, RN notified, and daughter present..    GOALS:   Multidisciplinary Problems       Physical Therapy Goals          Problem: Physical Therapy    Goal Priority Disciplines Outcome Goal Variances Interventions   Physical Therapy Goal     PT, PT/OT      Description: Goals to be met by: 2022    Patient will increase functional independence with mobility by performin. Supine to sit with Stand-by Assistance  2. Sit to stand transfer with Stand-by Assistance  3. Gait  x 100  feet with Stand-by Assistance using No Assistive Device.                          Time Tracking:     PT Received On: 22  PT Start Time: 1025     PT Stop Time: 1038  PT Total Time (min): 13 min     Billable Minutes: Gait Training 13    Treatment Type: Treatment  PT/PTA: PTA     PTA Visit Number: 1     2022

## 2022-09-01 NOTE — HPI
Chief Complaint:  Status post right hemicolectomy     Principal problem:  Status post right hemicolectomy    71-year-old  female with history of diet-controlled diabetes and uncontrolled hypertension just had right hemicolectomy on account of malignant neoplasm of ascending colon.  Hospital Medicine consulted for medical management.  Patient is currently status post surgery and is currently sleeping and her 2 daughters are by bedside given history.  They state that patient has had uncontrolled hypertension despite multiple many medications adjustment done by her cardiologist Dr Zuniga.  She denies any headaches, blurring of vision, weakness of extremities, or chest pain.  Her diabetes is mainly controlled with oral metformin and diet.  Patient had been noticing blood in her stool and she is found to have colon cancer hence the surgery.  She smokes 6 cigarettes daily and she states that she is currently weaning off.  She does not drink alcohol or use any other illicit medication.

## 2022-09-01 NOTE — PROGRESS NOTES
Progress Note  Nephrology    Consult Requested By: Pranav Mccloud III, *    Reason for Consult: hyponatremia    SUBJECTIVE:     History of Present Illness:  72 y/o female patient admitted 8/29, is s/p R hemicolectomy d/t malignant neoplasm of ascending colon.  Pressures have been high, hydralazine was added on prn as of Monday.  Pt w/chronically low sodium, baseline is 130-131.  She is currently down to 128, and nephrology is consulted for co-management.    8/30  pt seen and examined.  States she was taken off HCTZ about a year and a half ago, developed generalized edema she describes as severe.  Explained to pt that I would hold this medication for now, but that we will watch her very closely, and that a different diuretic might be better for her going forward.  Voiced understanding.  Currently has NS at 75cc/hr, still on CL diet.  No edema, no SOB.  Still having some post-op abd pain.  8/31  Na+ improved to 129.  Daughter at bedside today, pt got HCTZ yesterday, holding today, explained why.  Told her the plan is to use lasix going forward.  Voiced understanding and seems ok with this.  Pt c/o fatigue today--Hgb actually improved.  K+ low, has SS repletion orders.  Diet changed to FL today.  9/1 no complaints- working with PT    Assessment/plan:    Hyponatremia due to hypovolemia from HCTZ superimposed on chronic SIADH  Hypokalemia  HypoMg  Baseline HTN    - serum Na is improved to 130  - stop NS IVFs  - start salt tablet 1g BID and lasix 20mg daily  - replete K, Mg PRN  - avoid thiazides    Review of Systems:  Negative except as noted above    OBJECTIVE:     Vital Signs Range (Last 24H):  Temp:  [97.9 °F (36.6 °C)-98.7 °F (37.1 °C)]   Pulse:  [72-84]   Resp:  [17-18]   BP: (161-191)/(64-90)   SpO2:  [96 %-99 %]     Physical Exam:  General- NAD noted  HEENT- WNL  Neck- supple  CV- Regular rate and rhythm  Resp- Lungs CTA Bilaterally, No increased WOB  GI- Non tender/non-distended, BS normoactive x4  quads  Extrem- No cyanosis, clubbing, edema.  Derm- skin w/d  Neuro-  No flap.     Body mass index is 22.89 kg/m².    Laboratory:  CBC:   Recent Labs   Lab 09/01/22  0459   WBC 6.38   RBC 3.33*   HGB 9.4*   HCT 27.7*      MCV 83   MCH 28.2   MCHC 33.9       CMP:   Recent Labs   Lab 08/26/22  1529 08/29/22  0610 09/01/22  0459   *   < > 122*   CALCIUM 8.4*   < > 8.3*   ALBUMIN 3.8  --   --    PROT 7.3  --   --    *   < > 130*   K 3.4*   < > 4.0   CO2 28   < > 29   CL 94*   < > 96   BUN 18   < > 10   CREATININE 1.0   < > 0.7   ALKPHOS 98  --   --    ALT 16  --   --    AST 17  --   --    BILITOT 1.1*  --   --     < > = values in this interval not displayed.         Diagnostic Results:  Labs: Reviewed    ASSESSMENT/PLAN:     Active Hospital Problems    Diagnosis  POA    *Malignant neoplasm of ascending colon [C18.2]  Yes    Hyponatremia [E87.1]  Yes    Hypertensive urgency [I16.0]  Yes    Iron deficiency [E61.1]  Yes    Type 2 diabetes mellitus without complication [E11.9]  Yes      Resolved Hospital Problems   No resolved problems to display.         Thank you for allowing us to participate in the care of your patient. We will follow the patient and provide recommendations as needed.      Patient care time was spent personally by me on the following activities: > 35 minutes  Obtaining a history.  Examination of patient.  Providing medical care at the patients bedside.  Developing a treatment plan with patient or surrogate and bedside caregivers.  Ordering and reviewing laboratory studies, radiographic studies, pulse oximetry.  Ordering and performing treatments and interventions.  Evaluation of patient's response to treatment.  Discussions with consultants while on the unit and immediately available to the patient.  Re-evaluation of the patient's condition.  Documentation in the medical record.      Leslie Chung MD

## 2022-09-01 NOTE — ASSESSMENT & PLAN NOTE
Patient has a current diagnosis of hypertensive urgency (without evidence of end organ damage) which is uncontrolled.  Latest blood pressure and vitals reviewed-   Temp:  [97.9 °F (36.6 °C)-98.7 °F (37.1 °C)]   Pulse:  [67-76]   Resp:  [17-18]   BP: (161-191)/(64-90)   SpO2:  [96 %-99 %] .   Patient currently off IV antihypertensives.   Home meds for hypertension were reviewed and noted below.   Hypertension Medications             cloNIDine (CATAPRES) 0.1 MG tablet Take 1 tablet (0.1 mg total) by mouth 2 (two) times daily. Hold if heart rate < 60/min and SBP < 100 mmHg.    losartan-hydrochlorothiazide 100-12.5 mg (HYZAAR) 100-12.5 mg Tab Take 1 tablet by mouth once daily.    metoprolol tartrate (LOPRESSOR) 100 MG tablet Take 100 mg by mouth once daily.    terazosin (HYTRIN) 1 MG capsule Take 2 mg by mouth every evening.          Medication adjustment for hospital antihypertensives is as follows- labetalol increased to 200 mg t.i.d. and Lopressor discontinued    Will aim for controlled BP reduction by medications noted above. Monitor and mitigate end organ damage as indicated.

## 2022-09-01 NOTE — ASSESSMENT & PLAN NOTE
Status post right nahomy colectomy D 3  Continue to monitor H&H closely  Surgery on board  iron replacedwith IV ferrlicit  Currently on liquid diet

## 2022-09-01 NOTE — HOSPITAL COURSE
71-year-old  female with recent diagnosis of adenocarcinoma of the colon admitted for right colectomy.      Stable postoperative course.  Her incisional pain improved and had return of bowel function.  Passing flatus and multiple BMs on the day of discharge.  Hospital stay notable for hypertensive urgency and hyponatremia and was evaluated by an Nephrology.  Antihypertensive regimen changed as outlined below. Patient aware to obtain renal function panel early next week and follow-up with Nephrology.  She is also aware to follow-up with General surgery.    Case discussed with patient and daughter as well as Dr. Mccloud from General surgery.    I have seen the patient on the day of discharge and reviewed the discharge instructions as outlined below. Patient verbalized understanding and is aware to contact primary care physician or return to ED if new or worsening symptoms.

## 2022-09-02 VITALS
BODY MASS INDEX: 22.77 KG/M2 | HEART RATE: 80 BPM | OXYGEN SATURATION: 98 % | RESPIRATION RATE: 18 BRPM | WEIGHT: 133.38 LBS | DIASTOLIC BLOOD PRESSURE: 64 MMHG | HEIGHT: 64 IN | SYSTOLIC BLOOD PRESSURE: 165 MMHG | TEMPERATURE: 98 F

## 2022-09-02 PROBLEM — Z90.49 S/P RIGHT COLECTOMY: Status: ACTIVE | Noted: 2022-09-02

## 2022-09-02 LAB
ANION GAP SERPL CALC-SCNC: 8 MMOL/L (ref 8–16)
BUN SERPL-MCNC: 11 MG/DL (ref 8–23)
CALCIUM SERPL-MCNC: 8.3 MG/DL (ref 8.7–10.5)
CHLORIDE SERPL-SCNC: 92 MMOL/L (ref 95–110)
CO2 SERPL-SCNC: 29 MMOL/L (ref 23–29)
CREAT SERPL-MCNC: 0.7 MG/DL (ref 0.5–1.4)
ERYTHROCYTE [DISTWIDTH] IN BLOOD BY AUTOMATED COUNT: 13.6 % (ref 11.5–14.5)
EST. GFR  (NO RACE VARIABLE): >60 ML/MIN/1.73 M^2
GLUCOSE SERPL-MCNC: 107 MG/DL (ref 70–110)
GLUCOSE SERPL-MCNC: 120 MG/DL (ref 70–110)
HCT VFR BLD AUTO: 28.6 % (ref 37–48.5)
HGB BLD-MCNC: 9.5 G/DL (ref 12–16)
MAGNESIUM SERPL-MCNC: 1.7 MG/DL (ref 1.6–2.6)
MCH RBC QN AUTO: 27.9 PG (ref 27–31)
MCHC RBC AUTO-ENTMCNC: 33.2 G/DL (ref 32–36)
MCV RBC AUTO: 84 FL (ref 82–98)
PLATELET # BLD AUTO: 193 K/UL (ref 150–450)
PMV BLD AUTO: 9.7 FL (ref 9.2–12.9)
POTASSIUM SERPL-SCNC: 3.8 MMOL/L (ref 3.5–5.1)
RBC # BLD AUTO: 3.41 M/UL (ref 4–5.4)
SODIUM SERPL-SCNC: 129 MMOL/L (ref 136–145)
WBC # BLD AUTO: 5.01 K/UL (ref 3.9–12.7)

## 2022-09-02 PROCEDURE — 83735 ASSAY OF MAGNESIUM: CPT | Performed by: STUDENT IN AN ORGANIZED HEALTH CARE EDUCATION/TRAINING PROGRAM

## 2022-09-02 PROCEDURE — 36415 COLL VENOUS BLD VENIPUNCTURE: CPT | Performed by: SURGERY

## 2022-09-02 PROCEDURE — 94799 UNLISTED PULMONARY SVC/PX: CPT

## 2022-09-02 PROCEDURE — 25000003 PHARM REV CODE 250: Performed by: STUDENT IN AN ORGANIZED HEALTH CARE EDUCATION/TRAINING PROGRAM

## 2022-09-02 PROCEDURE — 85027 COMPLETE CBC AUTOMATED: CPT | Performed by: SURGERY

## 2022-09-02 PROCEDURE — 25000003 PHARM REV CODE 250: Performed by: INTERNAL MEDICINE

## 2022-09-02 PROCEDURE — 25000003 PHARM REV CODE 250: Performed by: SURGERY

## 2022-09-02 PROCEDURE — 94761 N-INVAS EAR/PLS OXIMETRY MLT: CPT

## 2022-09-02 PROCEDURE — 99900035 HC TECH TIME PER 15 MIN (STAT)

## 2022-09-02 PROCEDURE — 27000221 HC OXYGEN, UP TO 24 HOURS

## 2022-09-02 PROCEDURE — S4991 NICOTINE PATCH NONLEGEND: HCPCS | Performed by: SURGERY

## 2022-09-02 PROCEDURE — 80048 BASIC METABOLIC PNL TOTAL CA: CPT | Performed by: SURGERY

## 2022-09-02 RX ORDER — OXYCODONE AND ACETAMINOPHEN 5; 325 MG/1; MG/1
1 TABLET ORAL EVERY 6 HOURS PRN
Qty: 28 TABLET | Refills: 0 | Status: SHIPPED | OUTPATIENT
Start: 2022-09-02 | End: 2022-09-09

## 2022-09-02 RX ORDER — ONDANSETRON 4 MG/1
4 TABLET, FILM COATED ORAL EVERY 6 HOURS PRN
Qty: 28 TABLET | Refills: 0 | Status: SHIPPED | OUTPATIENT
Start: 2022-09-02 | End: 2022-09-09

## 2022-09-02 RX ORDER — LOSARTAN POTASSIUM 100 MG/1
100 TABLET ORAL DAILY
Qty: 90 TABLET | Refills: 3 | Status: SHIPPED | OUTPATIENT
Start: 2022-09-03 | End: 2024-03-22 | Stop reason: ALTCHOICE

## 2022-09-02 RX ORDER — LABETALOL 200 MG/1
200 TABLET, FILM COATED ORAL 3 TIMES DAILY
Qty: 90 TABLET | Refills: 11 | Status: SHIPPED | OUTPATIENT
Start: 2022-09-02 | End: 2024-03-22

## 2022-09-02 RX ORDER — FUROSEMIDE 20 MG/1
20 TABLET ORAL DAILY
Qty: 90 TABLET | Refills: 3 | Status: SHIPPED | OUTPATIENT
Start: 2022-09-03 | End: 2024-03-22

## 2022-09-02 RX ORDER — NICOTINE 7MG/24HR
1 PATCH, TRANSDERMAL 24 HOURS TRANSDERMAL DAILY
Qty: 28 PATCH | Refills: 0 | Status: SHIPPED | OUTPATIENT
Start: 2022-09-03 | End: 2024-03-22

## 2022-09-02 RX ADMIN — MUPIROCIN 1 G: 20 OINTMENT TOPICAL at 08:09

## 2022-09-02 RX ADMIN — NICOTINE 1 PATCH: 7 PATCH, EXTENDED RELEASE TRANSDERMAL at 08:09

## 2022-09-02 RX ADMIN — OXYCODONE AND ACETAMINOPHEN 1 TABLET: 5; 325 TABLET ORAL at 08:09

## 2022-09-02 RX ADMIN — LABETALOL HYDROCHLORIDE 200 MG: 100 TABLET, FILM COATED ORAL at 08:09

## 2022-09-02 RX ADMIN — PANTOPRAZOLE SODIUM 40 MG: 40 TABLET, DELAYED RELEASE ORAL at 06:09

## 2022-09-02 RX ADMIN — OXYBUTYNIN CHLORIDE 5 MG: 5 TABLET, EXTENDED RELEASE ORAL at 08:09

## 2022-09-02 RX ADMIN — SIMETHICONE 80 MG: 80 TABLET, CHEWABLE ORAL at 10:09

## 2022-09-02 RX ADMIN — LOSARTAN POTASSIUM 100 MG: 50 TABLET, FILM COATED ORAL at 08:09

## 2022-09-02 RX ADMIN — LABETALOL HYDROCHLORIDE 200 MG: 100 TABLET, FILM COATED ORAL at 03:09

## 2022-09-02 RX ADMIN — FUROSEMIDE 20 MG: 20 TABLET ORAL at 08:09

## 2022-09-02 RX ADMIN — SODIUM CHLORIDE TAB 1 GM 1 G: 1 TAB at 08:09

## 2022-09-02 RX ADMIN — OXYCODONE AND ACETAMINOPHEN 1 TABLET: 5; 325 TABLET ORAL at 03:09

## 2022-09-02 RX ADMIN — SIMETHICONE 80 MG: 80 TABLET, CHEWABLE ORAL at 01:09

## 2022-09-02 NOTE — PLAN OF CARE
Problem: Oral Intake Inadequate  Goal: Improved Oral Intake  Outcome: Ongoing, Progressing  Intervention: Promote and Optimize Oral Intake  Flowsheets (Taken 9/2/2022 1613)  Oral Nutrition Promotion: calorie-dense liquids provided

## 2022-09-02 NOTE — PROGRESS NOTES
[facility]  General Surgery  Progress Note    Subjective:     Interval History: Feeling much better today. Started passing flatus and has had multiple loose BMS.   Afebrile. No nausea.     Path has returned T2N0. Margins clear.     Post-Op Info:  Procedure(s) (LRB):  LAPAROSCOPY, HAND- ASSISTED (N/A)  COLECTOMY, RIGHT, LAPAROSCOPIC   4 Days Post-Op      Medications:  Continuous Infusions:  Scheduled Meds:   doxazosin  2 mg Oral QHS    enoxaparin  40 mg Subcutaneous Daily    furosemide  20 mg Oral Daily    labetaloL  200 mg Oral TID    losartan  100 mg Oral Daily    mupirocin  1 g Nasal BID    nicotine  1 patch Transdermal Daily    oxybutynin  5 mg Oral Daily    pantoprazole  40 mg Oral Daily    polyethylene glycol  17 g Oral Daily    simethicone  1 tablet Oral TID PC     PRN Meds:acetaminophen, calcium chloride IVPB, calcium chloride IVPB, calcium chloride IVPB, clonazePAM, dextrose 10%, dextrose 10%, dextrose 50%, glucagon (human recombinant), hydrALAZINE, insulin aspart U-100, labetalol, magnesium oxide, magnesium sulfate IVPB, magnesium sulfate IVPB, magnesium sulfate IVPB, magnesium sulfate IVPB, ondansetron, oxyCODONE-acetaminophen, potassium chloride in water, potassium chloride in water, potassium chloride in water, potassium chloride in water, potassium chloride, potassium chloride, potassium chloride, potassium chloride, sodium chloride 0.9%, sodium phosphate IVPB, sodium phosphate IVPB, sodium phosphate IVPB, sodium phosphate IVPB, sodium phosphate IVPB     Objective:     Vital Signs (Most Recent):  Temp: 98.2 °F (36.8 °C) (09/02/22 1139)  Pulse: 80 (09/02/22 1139)  Resp: 18 (09/02/22 1536)  BP: (!) 165/64 (09/02/22 1139)  SpO2: 98 % (09/02/22 1343)   Vital Signs (24h Range):  Temp:  [97.8 °F (36.6 °C)-98.3 °F (36.8 °C)] 98.2 °F (36.8 °C)  Pulse:  [70-84] 80  Resp:  [16-20] 18  SpO2:  [95 %-98 %] 98 %  BP: (154-190)/(64-78) 165/64     No intake or output data in the 24 hours ending 09/02/22  1629    Physical Exam  Constitutional:       General: She is not in acute distress.  Pulmonary:      Effort: Pulmonary effort is normal. No accessory muscle usage.   Abdominal:      General: There is no distension.          Comments: Soft. Much less distended.   Appropriate tenderness  Incisions are clean dry and intact      Neurological:      Mental Status: She is alert.       Significant Labs:  CBC:   Recent Labs   Lab 09/02/22  0455   WBC 5.01   RBC 3.41*   HGB 9.5*   HCT 28.6*      MCV 84   MCH 27.9   MCHC 33.2     CMP:   Recent Labs   Lab 09/02/22  0455      CALCIUM 8.3*   *   K 3.8   CO2 29   CL 92*   BUN 11   CREATININE 0.7       Significant Diagnostics:  I have reviewed all pertinent imaging results/findings within the past 24 hours.    Assessment/Plan:     Active Diagnoses:    Diagnosis Date Noted POA    PRINCIPAL PROBLEM:  Malignant neoplasm of ascending colon [C18.2] 08/29/2022 Yes    S/P right colectomy [Z90.49] 09/02/2022 Not Applicable    Hyponatremia [E87.1] 08/30/2022 Yes    Hypertensive urgency [I16.0] 08/30/2022 Yes    Iron deficiency [E61.1] 08/30/2022 Yes    Type 2 diabetes mellitus without complication [E11.9] 08/10/2016 Yes      Problems Resolved During this Admission:   -Doing better today. Now pasing flatus and BM. Abdomen no longer distended. Tolerating diet. Discharge today. Follow up with me tow weeks.   Path returned T2N0. She has oncology follow up Next Tuesday.     Pranav Mccloud III, MD  General Surgery  [facility]

## 2022-09-02 NOTE — PROGRESS NOTES
Progress Note  Nephrology    Consult Requested By: Pranav Mccloud III, *    Reason for Consult: hyponatremia    SUBJECTIVE:     History of Present Illness:  70 y/o female patient admitted 8/29, is s/p R hemicolectomy d/t malignant neoplasm of ascending colon.  Pressures have been high, hydralazine was added on prn as of Monday.  Pt w/chronically low sodium, baseline is 130-131.  She is currently down to 128, and nephrology is consulted for co-management.    8/30  pt seen and examined.  States she was taken off HCTZ about a year and a half ago, developed generalized edema she describes as severe.  Explained to pt that I would hold this medication for now, but that we will watch her very closely, and that a different diuretic might be better for her going forward.  Voiced understanding.  Currently has NS at 75cc/hr, still on CL diet.  No edema, no SOB.  Still having some post-op abd pain.  8/31  Na+ improved to 129.  Daughter at bedside today, pt got HCTZ yesterday, holding today, explained why.  Told her the plan is to use lasix going forward.  Voiced understanding and seems ok with this.  Pt c/o fatigue today--Hgb actually improved.  K+ low, has SS repletion orders.  Diet changed to FL today.  9/1 no complaints- working with PT  9/2 BP spiking- stop salt tabs- feels overall better- discussed that she must balance fluid and solute intake- she drinks more than eats    Assessment/plan:    Hyponatremia due to hypovolemia from HCTZ superimposed on chronic SIADH  Hypokalemia  HypoMg  Baseline HTN    - serum Na is improved   - discharge with lasix 20mg daily- family can hold if patient feels dehydrated  - ok with normal sodium diet  - replete K, Mg PRN  - avoid thiazides    Repeat renal panel Monday and f/u in 2-4 weeks    Review of Systems:  Negative except as noted above    OBJECTIVE:     Vital Signs Range (Last 24H):  Temp:  [97.8 °F (36.6 °C)-98.7 °F (37.1 °C)]   Pulse:  [67-84]   Resp:  [16-20]   BP:  (154-190)/(68-80)   SpO2:  [97 %-98 %]     Physical Exam:  General- NAD noted  HEENT- WNL  Neck- supple  CV- Regular rate and rhythm  Resp- Lungs CTA Bilaterally, No increased WOB  GI- Non tender/non-distended, BS normoactive x4 quads  Extrem- No cyanosis, clubbing, edema.  Derm- skin w/d  Neuro-  No flap.     Body mass index is 22.89 kg/m².    Laboratory:  CBC:   Recent Labs   Lab 09/02/22  0455   WBC 5.01   RBC 3.41*   HGB 9.5*   HCT 28.6*      MCV 84   MCH 27.9   MCHC 33.2     CMP:   Recent Labs   Lab 08/26/22  1529 08/29/22  0610 09/02/22  0455   *   < > 107   CALCIUM 8.4*   < > 8.3*   ALBUMIN 3.8  --   --    PROT 7.3  --   --    *   < > 129*   K 3.4*   < > 3.8   CO2 28   < > 29   CL 94*   < > 92*   BUN 18   < > 11   CREATININE 1.0   < > 0.7   ALKPHOS 98  --   --    ALT 16  --   --    AST 17  --   --    BILITOT 1.1*  --   --     < > = values in this interval not displayed.       Diagnostic Results:  Labs: Reviewed    ASSESSMENT/PLAN:     Active Hospital Problems    Diagnosis  POA    *Malignant neoplasm of ascending colon [C18.2]  Yes    Hyponatremia [E87.1]  Yes    Hypertensive urgency [I16.0]  Yes    Iron deficiency [E61.1]  Yes    Type 2 diabetes mellitus without complication [E11.9]  Yes      Resolved Hospital Problems   No resolved problems to display.         Thank you for allowing us to participate in the care of your patient. We will follow the patient and provide recommendations as needed.      Patient care time was spent personally by me on the following activities: > 35 minutes  Obtaining a history.  Examination of patient.  Providing medical care at the patients bedside.  Developing a treatment plan with patient or surrogate and bedside caregivers.  Ordering and reviewing laboratory studies, radiographic studies, pulse oximetry.  Ordering and performing treatments and interventions.  Evaluation of patient's response to treatment.  Discussions with consultants while on the unit and  immediately available to the patient.  Re-evaluation of the patient's condition.  Documentation in the medical record.      Leslie Chung MD

## 2022-09-02 NOTE — PT/OT/SLP PROGRESS
Physical Therapy      Patient Name:  Pebbles Boyle   MRN:  [id number]    Patient not seen today secondary to Other (Comment) (Pt declined to participate x 2 attempts.). Pt will need RW at discharge. Will follow-up next service date.

## 2022-09-02 NOTE — DISCHARGE SUMMARY
[facility] Medicine  Discharge Summary      Patient Name: Pebbles Boyle  MRN: [id number]  Patient Class: IP- Inpatient  Admission Date: 8/29/2022  Hospital Length of Stay: 4 days  Discharge Date and Time: 9/2/2022  4:36 PM  Attending Physician: Alesha att. providers found   Discharging Provider: Mj Ashraf MD  Primary Care Provider: Janeth Quezada NP    Procedure(s) (LRB):  LAPAROSCOPY, HAND- ASSISTED (N/A)  COLECTOMY, RIGHT, LAPAROSCOPIC      Hospital Course:   71-year-old  female with recent diagnosis of adenocarcinoma of the colon admitted for right colectomy.      Stable postoperative course.  Her incisional pain improved and had return of bowel function.  Passing flatus and multiple BMs on the day of discharge.  Hospital stay notable for hypertensive urgency and hyponatremia and was evaluated by an Nephrology.  Antihypertensive regimen changed as outlined below. Patient aware to obtain renal function panel early next week and follow-up with Nephrology.  She is also aware to follow-up with General surgery.    Case discussed with patient and daughter as well as Dr. Mccloud from General surgery.    I have seen the patient on the day of discharge and reviewed the discharge instructions as outlined below. Patient verbalized understanding and is aware to contact primary care physician or return to ED if new or worsening symptoms.         Goals of Care Treatment Preferences:  Code Status: Full Code      Consults:   Consults (From admission, onward)        Status Ordering Provider     Inpatient consult to Registered Dietitian/Nutritionist  Once        Provider:  (Not yet assigned)    ERINN Haddad     Inpatient consult to Nephrology  Once        Provider:  Leslie Chung MD    Completed ERINN DURBIN     Inpatient consult to Internal Medicine  Once        Provider:  MD Alec Lucas FRANCIS J. III          No new Assessment & Plan  "notes have been filed under this hospital service since the last note was generated.  Service: Hospital Medicine    Final Active Diagnoses:    Diagnosis Date Noted POA    PRINCIPAL PROBLEM:  Malignant neoplasm of ascending colon [C18.2] 08/29/2022 Yes    S/P right colectomy [Z90.49] 09/02/2022 Not Applicable    Hyponatremia [E87.1] 08/30/2022 Yes    Hypertensive urgency [I16.0] 08/30/2022 Yes    Iron deficiency [E61.1] 08/30/2022 Yes    Type 2 diabetes mellitus without complication [E11.9] 08/10/2016 Yes      Problems Resolved During this Admission:       Discharged Condition: stable    Disposition: Home or Self Care    Follow Up:   Follow-up Information     Pranav Mccloud III, MD. Schedule an appointment as soon as possible for a visit in 1 week(s).    Specialties: General Surgery, Surgery  Why: For wound re-check  Contact information:  [address]  [address]  [place] [address]  [phone]             Leslie Chung MD. Schedule an appointment as soon as possible for a visit in 3 week(s).    Specialty: Nephrology  Why: Patient please call and arrange follow up appointment per Dr Chung office.  unable to schedule.  Contact information:  [address]  [place] [address]  [phone]             Janeth M Damien, NP. Go on 9/20/2022.    Specialty: Family Medicine  Why: 11:00  Contact information:  [address] [place] [address] [address]  [place] [address] [phone]                       Patient Instructions:      WALKER FOR HOME USE     Order Specific Question Answer Comments   Type of Walker: Adult (5'4"-6'6")    With wheels? Yes    Height: 5' 4" (1.626 m)    Weight: 60.5 kg (133 lb 6.1 oz)    Length of need (1-99 months): 99    Does patient have medical equipment at home? none    Please check all that apply: Patient's condition impairs ambulation.      Renal function panel   Standing Status: Future Standing Exp. Date: 11/01/23     Diet Dysphagia Soft   Order Comments: [place] " diabetic diet     Notify your health care provider if you experience any of the following:  temperature >100.4     Notify your health care provider if you experience any of the following:  persistent nausea and vomiting or diarrhea     Notify your health care provider if you experience any of the following:  severe uncontrolled pain     Notify your health care provider if you experience any of the following:  difficulty breathing or increased cough     Notify your health care provider if you experience any of the following:  persistent dizziness, light-headedness, or visual disturbances     Notify your health care provider if you experience any of the following:  increased confusion or weakness     Notify your health care provider if you experience any of the following:   Order Comments: Worsening or recurrent symptoms     Activity as tolerated       Significant Diagnostic Studies: Labs:   CMP   Recent Labs   Lab 09/01/22 0459 09/02/22 0455   * 129*   K 4.0 3.8   CL 96 92*   CO2 29 29   * 107   BUN 10 11   CREATININE 0.7 0.7   CALCIUM 8.3* 8.3*   ANIONGAP 5* 8    and CBC   Recent Labs   Lab 09/01/22 0459 09/02/22 0455   WBC 6.38 5.01   HGB 9.4* 9.5*   HCT 27.7* 28.6*    193       Pending Diagnostic Studies:     None         Medications:  Reconciled Home Medications:      Medication List      START taking these medications    furosemide 20 MG tablet  Commonly known as: LASIX  Take 1 tablet (20 mg total) by mouth once daily.  Start taking on: September 3, 2022     labetaloL 200 MG tablet  Commonly known as: NORMODYNE  Take 1 tablet (200 mg total) by mouth 3 (three) times daily.     losartan 100 MG tablet  Commonly known as: COZAAR  Take 1 tablet (100 mg total) by mouth once daily.  Start taking on: September 3, 2022     nicotine 7 mg/24 hr  Commonly known as: NICODERM CQ  Place 1 patch onto the skin once daily.  Start taking on: September 3, 2022     ondansetron 4 MG tablet  Commonly known as:  ZOFRAN  Take 1 tablet (4 mg total) by mouth every 6 (six) hours as needed for Nausea.     oxyCODONE-acetaminophen 5-325 mg per tablet  Commonly known as: PERCOCET  Take 1 tablet by mouth every 6 (six) hours as needed for Pain.        CONTINUE taking these medications    albuterol 90 mcg/actuation inhaler  Commonly known as: PROVENTIL/VENTOLIN HFA  inhale 2 puff by inhalation route  every 4 - 6 hours as needed as needed     clonazePAM 0.5 MG tablet  Commonly known as: KlonoPIN  Take 0.5 mg by mouth 2 (two) times daily as needed for Anxiety.     cyanocobalamin 1000 MCG tablet  Commonly known as: VITAMIN B-12  Take 1,000 mcg by mouth once daily.     FeroSuL 325 mg (65 mg iron) Tab tablet  Generic drug: ferrous sulfate  Take by mouth every other day.     pantoprazole 40 MG tablet  Commonly known as: PROTONIX  Take 40 mg by mouth once daily.     PRESERVISION AREDS 2 (OMEGA-3) ORAL  Take 1 capsule by mouth 2 (two) times a day.     terazosin 1 MG capsule  Commonly known as: HYTRIN  Take 2 mg by mouth every evening.     vitamin D 1000 units Tab  Commonly known as: VITAMIN D3  Take 1,000 Units by mouth once daily.     vitamins A,C,E-zinc-copper 14,320-226-200 unit-mg-unit Cap  Take 1 capsule by mouth as needed.        STOP taking these medications    cloNIDine 0.1 MG tablet  Commonly known as: CATAPRES     cyclobenzaprine 10 MG tablet  Commonly known as: FLEXERIL     losartan-hydrochlorothiazide 100-12.5 mg 100-12.5 mg Tab  Commonly known as: HYZAAR     metoprolol tartrate 100 MG tablet  Commonly known as: LOPRESSOR            Indwelling Lines/Drains at time of discharge:   Lines/Drains/Airways     None                 Time spent on the discharge of patient: 35 minutes         Mj Ashraf MD  Department of Hospital Medicine  [facility]

## 2022-09-02 NOTE — CARE UPDATE
09/02/22 1343   Patient Assessment/Suction   Level of Consciousness (AVPU) alert   Respiratory Effort Normal;Unlabored   PRE-TX-O2   O2 Device (Oxygen Therapy) nasal cannula   $ Is the patient on Low Flow Oxygen? Yes   Flow (L/min) 2   SpO2 98 %   Pulse Oximetry Type Intermittent   $ Pulse Oximetry - Multiple Charge Pulse Oximetry - Multiple   Respiratory Evaluation   $ Care Plan Tech Time 15 min   $ Eval/Re-eval Charges Evaluation   Evaluation For Re-Eval 3 day   PT WEARS O2 PRN

## 2022-09-02 NOTE — PLAN OF CARE
Patient cleared for discharge from case management standpoint.    Follow up appointments scheduled and added to AVS. Dr Elliott office will not allow CM to schedule appointment. Instructed patient to call for appointment.    Chart and discharge orders reviewed.  Patient discharged home with no further case management needs.       09/02/22 1441   Final Note   Assessment Type Final Discharge Note   Anticipated Discharge Disposition Home   What phone number can be called within the next 1-3 days to see how you are doing after discharge? [phone]   Hospital Resources/Appts/Education Provided Provided patient/caregiver with written discharge plan information;Appointments scheduled and added to AVS   Post-Acute Status   Discharge Delays None known at this time

## 2022-09-02 NOTE — CARE UPDATE
09/01/22 4400   Patient Assessment/Suction   Level of Consciousness (AVPU) alert   Respiratory Effort Normal;Unlabored   Expansion/Accessory Muscles/Retractions no use of accessory muscles   PRE-TX-O2   O2 Device (Oxygen Therapy) room air   Flow (L/min)   (on at bedside for sob/chest pain)   SpO2 97 %   Pulse Oximetry Type Intermittent   $ Pulse Oximetry - Multiple Charge Pulse Oximetry - Multiple   Pulse 84   Resp 16   Incentive Spirometer   $ Incentive Spirometer Charges done independently per patient  (pt. states she obtains goals of 1000)   Encourage incentive spirometer

## 2022-09-06 ENCOUNTER — TELEPHONE (OUTPATIENT)
Dept: HEMATOLOGY/ONCOLOGY | Facility: CLINIC | Age: 72
End: 2022-09-06
Payer: MEDICARE

## 2022-09-06 NOTE — TELEPHONE ENCOUNTER
Assisted patient in scheduling follow up in [place] office on 9/16/ She will labs a few days before in [place]

## 2022-09-06 NOTE — TELEPHONE ENCOUNTER
----- Message from Lashonda Avila sent at 9/6/2022  3:42 PM CDT -----  Type: Needs Medical Advice  Who Called:  Patient   Symptoms (please be specific):    How long has patient had these symptoms:    Pharmacy name and phone #:    Best Call Back Number: [phone]  Additional Information: Patient is requesting a call back to reschedule her appt. for next week in [place].

## 2022-09-09 ENCOUNTER — LAB VISIT (OUTPATIENT)
Dept: LAB | Facility: HOSPITAL | Age: 72
End: 2022-09-09
Attending: SURGERY
Payer: MEDICARE

## 2022-09-09 DIAGNOSIS — C18.0 MALIGNANT NEOPLASM OF CECUM: ICD-10-CM

## 2022-09-09 LAB
ALBUMIN SERPL BCP-MCNC: 3.8 G/DL (ref 3.5–5.2)
ALP SERPL-CCNC: 108 U/L (ref 55–135)
ALT SERPL W/O P-5'-P-CCNC: 16 U/L (ref 10–44)
ANION GAP SERPL CALC-SCNC: 9 MMOL/L (ref 8–16)
AST SERPL-CCNC: 18 U/L (ref 10–40)
BASOPHILS # BLD AUTO: 0.01 K/UL (ref 0–0.2)
BASOPHILS NFR BLD: 0.2 % (ref 0–1.9)
BILIRUB SERPL-MCNC: 1.5 MG/DL (ref 0.1–1)
BUN SERPL-MCNC: 15 MG/DL (ref 8–23)
CALCIUM SERPL-MCNC: 8.8 MG/DL (ref 8.7–10.5)
CEA SERPL-MCNC: 3.6 NG/ML (ref 0–5)
CHLORIDE SERPL-SCNC: 99 MMOL/L (ref 95–110)
CO2 SERPL-SCNC: 26 MMOL/L (ref 23–29)
CREAT SERPL-MCNC: 1 MG/DL (ref 0.5–1.4)
DIFFERENTIAL METHOD: ABNORMAL
EOSINOPHIL # BLD AUTO: 0 K/UL (ref 0–0.5)
EOSINOPHIL NFR BLD: 0.2 % (ref 0–8)
ERYTHROCYTE [DISTWIDTH] IN BLOOD BY AUTOMATED COUNT: 13.9 % (ref 11.5–14.5)
EST. GFR  (NO RACE VARIABLE): 59.9 ML/MIN/1.73 M^2
FERRITIN SERPL-MCNC: 58 NG/ML (ref 20–300)
GLUCOSE SERPL-MCNC: 149 MG/DL (ref 70–110)
HCT VFR BLD AUTO: 29.6 % (ref 37–48.5)
HGB BLD-MCNC: 9.8 G/DL (ref 12–16)
IMM GRANULOCYTES # BLD AUTO: 0.05 K/UL (ref 0–0.04)
IMM GRANULOCYTES NFR BLD AUTO: 0.8 % (ref 0–0.5)
IRON SERPL-MCNC: 84 UG/DL (ref 30–160)
LDH SERPL L TO P-CCNC: 110 U/L (ref 110–260)
LYMPHOCYTES # BLD AUTO: 1 K/UL (ref 1–4.8)
LYMPHOCYTES NFR BLD: 15.9 % (ref 18–48)
MAGNESIUM SERPL-MCNC: 1.9 MG/DL (ref 1.6–2.6)
MCH RBC QN AUTO: 28.4 PG (ref 27–31)
MCHC RBC AUTO-ENTMCNC: 33.1 G/DL (ref 32–36)
MCV RBC AUTO: 86 FL (ref 82–98)
MONOCYTES # BLD AUTO: 0.4 K/UL (ref 0.3–1)
MONOCYTES NFR BLD: 6.4 % (ref 4–15)
NEUTROPHILS # BLD AUTO: 4.8 K/UL (ref 1.8–7.7)
NEUTROPHILS NFR BLD: 76.5 % (ref 38–73)
NRBC BLD-RTO: 0 /100 WBC
PLATELET # BLD AUTO: 207 K/UL (ref 150–450)
PMV BLD AUTO: 9 FL (ref 9.2–12.9)
POTASSIUM SERPL-SCNC: 4.2 MMOL/L (ref 3.5–5.1)
PROT SERPL-MCNC: 7.5 G/DL (ref 6–8.4)
RBC # BLD AUTO: 3.45 M/UL (ref 4–5.4)
SATURATED IRON: 22 % (ref 20–50)
SODIUM SERPL-SCNC: 134 MMOL/L (ref 136–145)
TOTAL IRON BINDING CAPACITY: 385 UG/DL (ref 250–450)
TRANSFERRIN SERPL-MCNC: 275 MG/DL (ref 200–375)
WBC # BLD AUTO: 6.21 K/UL (ref 3.9–12.7)

## 2022-09-09 PROCEDURE — 82378 CARCINOEMBRYONIC ANTIGEN: CPT | Performed by: INTERNAL MEDICINE

## 2022-09-09 PROCEDURE — 82728 ASSAY OF FERRITIN: CPT | Performed by: INTERNAL MEDICINE

## 2022-09-09 PROCEDURE — 83615 LACTATE (LD) (LDH) ENZYME: CPT | Performed by: INTERNAL MEDICINE

## 2022-09-09 PROCEDURE — 84238 ASSAY NONENDOCRINE RECEPTOR: CPT | Performed by: INTERNAL MEDICINE

## 2022-09-09 PROCEDURE — 80053 COMPREHEN METABOLIC PANEL: CPT | Performed by: INTERNAL MEDICINE

## 2022-09-09 PROCEDURE — 83735 ASSAY OF MAGNESIUM: CPT | Performed by: INTERNAL MEDICINE

## 2022-09-09 PROCEDURE — 85025 COMPLETE CBC W/AUTO DIFF WBC: CPT | Performed by: INTERNAL MEDICINE

## 2022-09-09 PROCEDURE — 84466 ASSAY OF TRANSFERRIN: CPT | Performed by: INTERNAL MEDICINE

## 2022-09-09 PROCEDURE — 36415 COLL VENOUS BLD VENIPUNCTURE: CPT | Performed by: INTERNAL MEDICINE

## 2022-09-12 LAB — STFR SERPL-SCNC: 20.7 NMOL/L (ref 12.2–27.3)

## 2022-09-15 ENCOUNTER — TELEPHONE (OUTPATIENT)
Dept: HEMATOLOGY/ONCOLOGY | Facility: CLINIC | Age: 72
End: 2022-09-15
Payer: MEDICARE

## 2022-09-16 ENCOUNTER — TELEPHONE (OUTPATIENT)
Dept: HEMATOLOGY/ONCOLOGY | Facility: CLINIC | Age: 72
End: 2022-09-16
Payer: MEDICARE

## 2022-09-16 ENCOUNTER — OFFICE VISIT (OUTPATIENT)
Dept: HEMATOLOGY/ONCOLOGY | Facility: CLINIC | Age: 72
End: 2022-09-16
Payer: MEDICARE

## 2022-09-16 ENCOUNTER — PATIENT MESSAGE (OUTPATIENT)
Dept: HEMATOLOGY/ONCOLOGY | Facility: CLINIC | Age: 72
End: 2022-09-16
Payer: MEDICARE

## 2022-09-16 DIAGNOSIS — Z15.09 LYNCH SYNDROME: ICD-10-CM

## 2022-09-16 DIAGNOSIS — C18.0 MALIGNANT NEOPLASM OF CECUM: Primary | ICD-10-CM

## 2022-09-16 DIAGNOSIS — D62 ACUTE BLOOD LOSS ANEMIA: ICD-10-CM

## 2022-09-16 PROCEDURE — 99214 PR OFFICE/OUTPT VISIT, EST, LEVL IV, 30-39 MIN: ICD-10-PCS | Mod: S$PBB,,, | Performed by: INTERNAL MEDICINE

## 2022-09-16 PROCEDURE — 99999 PR PBB SHADOW E&M-EST. PATIENT-LVL IV: CPT | Mod: PBBFAC,,, | Performed by: INTERNAL MEDICINE

## 2022-09-16 PROCEDURE — 99999 PR PBB SHADOW E&M-EST. PATIENT-LVL IV: ICD-10-PCS | Mod: PBBFAC,,, | Performed by: INTERNAL MEDICINE

## 2022-09-16 PROCEDURE — 99214 OFFICE O/P EST MOD 30 MIN: CPT | Mod: PBBFAC,PN | Performed by: INTERNAL MEDICINE

## 2022-09-16 PROCEDURE — 99214 OFFICE O/P EST MOD 30 MIN: CPT | Mod: S$PBB,,, | Performed by: INTERNAL MEDICINE

## 2022-09-16 NOTE — PROGRESS NOTES
Chief complaint:  Colon cancer    History of present illness:  The patient is a 71-year-old white female who presented with generalized fatigue malaise, was found to have iron deficiency anemia, underwent screening colonoscopy, and was found to have a fungating mass involving the cecum.  Biopsies taken at the time of endoscopy remarkable only for high-grade dysplasia.  Patient is status post hemicolectomy and returns to review postoperative pathology.  Patient is recovering well from surgery.  No difficulties with pain, wound healing, or bowel function.  Patient has resumed activities of daily living 2.5 weeks from surgery.    Past medical history:  1. Hypertension  2. Diabetes mellitus  3. History of endometrial cancer  4. Macular degeneration  5. Wears dentures  6. Premature ventricular contractions  7. History resected squamous cell carcinoma of the skin  8. Status post bilateral tubal ligation  9. Status post radial keratotomy  10. Status post cholecystectomy  11. Renal lithiasis-status post lithotripsy  12. Status post tonsillectomy  13. Status post partial nephrectomy for benign neoplasm.    Allergies:  1. Tramadol  3. Codeine  4. Hydrocodone    Medications:    Current Outpatient Medications:     albuterol (PROVENTIL/VENTOLIN HFA) 90 mcg/actuation inhaler, inhale 2 puff by inhalation route  every 4 - 6 hours as needed as needed, Disp: , Rfl:     ANTIOX #8/OM3/DHA/EPA/LUT/ZEAX (PRESERVISION AREDS 2, OMEGA-3, ORAL), Take 1 capsule by mouth 2 (two) times a day., Disp: , Rfl:     clonazePAM (KLONOPIN) 0.5 MG tablet, Take 0.5 mg by mouth 2 (two) times daily as needed for Anxiety., Disp: , Rfl:     cyanocobalamin (VITAMIN B-12) 1000 MCG tablet, Take 1,000 mcg by mouth once daily., Disp: , Rfl:     FEROSUL 325 mg (65 mg iron) Tab tablet, Take by mouth every other day., Disp: , Rfl:     furosemide (LASIX) 20 MG tablet, Take 1 tablet (20 mg total) by mouth once daily., Disp: 90 tablet, Rfl: 3    labetaloL (NORMODYNE)  200 MG tablet, Take 1 tablet (200 mg total) by mouth 3 (three) times daily., Disp: 90 tablet, Rfl: 11    losartan (COZAAR) 100 MG tablet, Take 1 tablet (100 mg total) by mouth once daily., Disp: 90 tablet, Rfl: 3    pantoprazole (PROTONIX) 40 MG tablet, Take 40 mg by mouth once daily., Disp: , Rfl:     terazosin (HYTRIN) 1 MG capsule, Take 2 mg by mouth every evening., Disp: , Rfl:     vitamin D (VITAMIN D3) 1000 units Tab, Take 1,000 Units by mouth once daily., Disp: , Rfl:     vitamins  A,C,E-zinc-copper 14,320-226-200 unit-mg-unit Cap, Take 1 capsule by mouth as needed., Disp: , Rfl:     nicotine (NICODERM CQ) 7 mg/24 hr, Place 1 patch onto the skin once daily. (Patient not taking: Reported on 9/16/2022), Disp: 28 patch, Rfl: 0     Family/social history:  Patient smokes 1/2 pack cigarettes daily and has done so for 25 years.  No smokeless tobacco.  History of alcohol abuse.  Sister suffered from breast cancer.  Patient also has another sister and 2 brothers who suffered from colorectal carcinoma.    Physical examination:  Well-developed, well-nourished, elderly, white female, no acute distress, who has a weight of 127 lb (decreased by 3.5 lb).  VITAL SIGNS: Documented  and reviewed this visit.  HEENT: Normocephalic, atraumatic. Oral mucosa pink and moist. Lips without lesions. Tongue midline. Oropharynx clear. Nonicteric sclerae.   NECK: Supple, no adenopathy. No carotid bruits, thyromegaly or thyroid nodule.   HEART: Regular rate and rhythm without murmur, gallop or rub.   LUNGS: Clear to auscultation bilaterally. Normal respiratory effort.   ABDOMEN: Soft, nontender, nondistended with positive normoactive bowel sounds, no hepatosplenomegaly.  Surgical scar is well approximated, healed, free from signs of local infection.  EXTREMITIES: No cyanosis, clubbing or edema. Distal pulses are intact.   AXILLAE AND GROIN: No palpable pathologic lymphadenopathy is appreciated.   SKIN: Intact/turgor normal   NEUROLOGIC:  Cranial nerves II-XII grossly intact. Motor: Good muscle bulk and tone. Strength/sensory 5/5 throughout. Gait stable.     Laboratory:  Studies obtained 09/09/2022.    White count 6.2, hemoglobin 9.8, hematocrit 29.6, platelets 207, absolute neutrophil count 4800.    Serum iron 84, TIBC 385, saturated iron 22%, ferritin 58, soluble transferrin receptor 20.7.      CT scanning of the chest/abdomen/pelvis with contrast:  Study obtained 07/18/2022.  - Mild degradation by motion.  - Concentric wall thickening involving the proximal ascending colon presumed to represent known colon neoplasm. An adjacent pericolonic lymph node medial to the involved segment of the colon measures 5 mm in short axis dimension.  - Tiny nodular densities observed within the right lung of questionable significance. These could be assessed for stability at the time of follow-up examinations.  - Prominent arteriosclerosis involving the thoracoabdominal aorta with fusiform dilatation of the infrarenal segment of the aorta. Calcified plaque formation results in approximately 50% luminal stenosis in the infrarenal segment of the abdominal aorta. - There appears to be significant narrowing of the origin of the left subclavian artery.  - Changes apparently related to previous partial right nephrectomy.  - Thyroid nodules which could be further investigated with ultrasound if warranted.      Pathology:  SEGMENTAL RIGHT COLON AND TERMINAL ILEUM RESECTION:   - INVASIVE MODERATELY DIFFERENTIATED COLON ADENOCARCINOMA.   - PROXIMAL, DISTAL AND CIRCUMFERENTIAL SURGICAL MARGINS FREE OF TUMOR    INVOLVEMENT.   - FIFTEEN REGIONAL LYMPH NODES NEGATIVE FOR MALIGNANCY.   - SEPARATE TUBULOVILLOUS ADENOMA WITH HIGH-GRADE DYSPLASIA.   - SURGICAL MARGINS NEGATIVE FOR DYSPLASIA.   - VERMIFORM APPENDIX WITH NO HISTOPATHOLOGIC ABNORMALITY.       Note: Representative sample of the tumor (block 1D) will be submitted    for immunohistochemistry (IHC) testing for mismatch  repair (MMR)    proteins. Addendum report will follow.     CASE SUMMARY FOR CARCINOMA OF THE COLON AND RECTUM, RESECTION SPECIMEN:   PROCEDURE:   Right hemicolectomy.   TUMOR SITE:   Ascending colon.   TUMOR SIZE:   6.8 cm.   MACROSCOPIC TUMOR PERFORATION:   Not identified.   HISTOLOGIC TYPE:   Adenocarcinoma.   HISTOLOGIC GRADE:   G2 moderately differentiated.   TUMOR EXTENSION:   Invades into muscularis propria.   SURGICAL MARGINS:   All margins negative for invasive carcinoma.   CLOSEST MARGIN TO INVASIVE CARCINOMA:   MARGIN STATUS FOR NON-INVASIVE TUMOR:   4.5 cm from proximal margin.   All margins negative for high-grade dysplasia, intramucosal carcinoma    and low grade dysplasia.   TREATMENT EFFECT:   No known presurgical therapy.   LYMPHOVASCULAR INVASION:   Not identified.   PERINEURAL INVASION:   REGIONAL LYMPH NODE STATUS:   NUMBER OF LYMPH NODES EXAMINED:   Not identified.   All regional lymph nodes negative for tumor.   Fifteen.   TUMOR DEPOSITS:   DISTANT METASTASIS:   Not identified.   Not applicable.   PATHOLOGIC STAGE CLASSIFICATION   BY AJCC 8TH EDITION:     ADDITIONAL FINDINGS:       pT2:  Tumor invades the muscularis propria.   pN0:  No regional lymph node metastasis.     Separate tubulovillous adenoma with high-grade dysplasia.       BRAF Mutation Analysis Results:   Test   Result   BRAF Mutation   Detected        Mutation(s)   c.1799Tgt A (p.V600E)     INTERPRETATION: The PRESENCE of the c.1799Tgt A (p.V600E) mutation of    the BRAF gene was detected. Correlate clinically for significance.     Immunohistochemistry (IHC) testing for mismatch repair (MMR) proteins:       MLH1: Loss of nuclear expression   MSH2: Intact nuclear expression   MSH6: Intact nuclear expression   PMS2: Loss of nuclear expression     IHC Interpretation:     Loss of nuclear expression of MLH1 and PMS2: Testing for methylation of    the MLH1 promoter and / or mutation of BRAF is indicated (the presence    of a BRAF V600E  mutation and / or MLH1 methylation suggests that the    tumor is sporadic in germline evaluation is probably not indicated;    absence of both MLH1 methylation and of BRAF V600E mutation suggests    the possibility of Almanzar syndrome and sequencing and / or large    deletion / duplication testing of germline MLH1 may be indicated.)     Impression:  1. Stage I (T2, N0, M0) adenocarcinoma of the ascending colon.  2. Almanzar syndrome.  3. Acute blood loss anemia.    Plan:  1. No role for postoperative adjuvant chemotherapy.  2. Survivorship clinic referral.  3. Return to clinic 3 months from now with interval CBC, CMP, LDH, and CEA.      This note was created using voice recognition software and may contain grammatical errors.

## 2022-09-16 NOTE — TELEPHONE ENCOUNTER
I spoke with [place] to schedule a survivorship appt and she voiced understanding of what the appt entails and voiced that she would like appt on in december because she has too many appts right now. Location was reviewed and message sent to portal if she has questions She said she can do a virtual appt.

## 2022-09-27 ENCOUNTER — OFFICE VISIT (OUTPATIENT)
Dept: SURGERY | Facility: CLINIC | Age: 72
End: 2022-09-27
Payer: MEDICARE

## 2022-09-27 VITALS
SYSTOLIC BLOOD PRESSURE: 208 MMHG | RESPIRATION RATE: 16 BRPM | HEART RATE: 70 BPM | DIASTOLIC BLOOD PRESSURE: 62 MMHG | TEMPERATURE: 98 F

## 2022-09-27 DIAGNOSIS — C18.2 MALIGNANT NEOPLASM OF ASCENDING COLON: Primary | ICD-10-CM

## 2022-09-27 PROCEDURE — 99024 PR POST-OP FOLLOW-UP VISIT: ICD-10-PCS | Mod: S$GLB,POP,, | Performed by: SURGERY

## 2022-09-27 PROCEDURE — 99024 POSTOP FOLLOW-UP VISIT: CPT | Mod: S$GLB,POP,, | Performed by: SURGERY

## 2022-09-28 NOTE — PROGRESS NOTES
Subjective:       Patient ID: Pebbles Boyle is a 72 y.o. female.    Chief Complaint: No chief complaint on file.      HPI:  72-year-old female status post right colectomy for colon cancer ascending colon.  Pathology has returned T2 N0.  She states she is feeling very well.  Tolerating diet.  No fevers or chills.  She has oncology appointment in 2 weeks.    Blood pressure high again today.  This has been a chronic issue.  She has primary care and Cardiology follow-up.    SEGMENTAL RIGHT COLON AND TERMINAL ILEUM RESECTION:   - INVASIVE MODERATELY DIFFERENTIATED COLON ADENOCARCINOMA.   - PROXIMAL, DISTAL AND CIRCUMFERENTIAL SURGICAL MARGINS FREE OF TUMOR    INVOLVEMENT.   - FIFTEEN REGIONAL LYMPH NODES NEGATIVE FOR MALIGNANCY.   - SEPARATE TUBULOVILLOUS ADENOMA WITH HIGH-GRADE DYSPLASIA.   - SURGICAL MARGINS NEGATIVE FOR DYSPLASIA.   - VERMIFORM APPENDIX WITH NO HISTOPATHOLOGIC ABNORMALITY.       Note: Representative sample of the tumor (block 1D) will be submitted    for immunohistochemistry (IHC) testing for mismatch repair (MMR)    proteins. Addendum report will follow.     CASE SUMMARY FOR CARCINOMA OF THE COLON AND RECTUM, RESECTION SPECIMEN:   PROCEDURE:   Right hemicolectomy.   TUMOR SITE:   Ascending colon.   TUMOR SIZE:   6.8 cm.   MACROSCOPIC TUMOR PERFORATION:   Not identified.   HISTOLOGIC TYPE:   Adenocarcinoma.   HISTOLOGIC GRADE:   G2 moderately differentiated.   TUMOR EXTENSION:   Invades into muscularis propria.   SURGICAL MARGINS:   All margins negative for invasive carcinoma.   CLOSEST MARGIN TO INVASIVE CARCINOMA:   MARGIN STATUS FOR NON-INVASIVE TUMOR:   4.5 cm from proximal margin.   All margins negative for high-grade dysplasia, intramucosal carcinoma    and low grade dysplasia.   TREATMENT EFFECT:   No known presurgical therapy.   LYMPHOVASCULAR INVASION:   Not identified.   PERINEURAL INVASION:   REGIONAL LYMPH NODE STATUS:   NUMBER OF LYMPH NODES EXAMINED:   Not identified.   All regional  lymph nodes negative for tumor.   Fifteen.   TUMOR DEPOSITS:   DISTANT METASTASIS:   Not identified.   Not applicable.   PATHOLOGIC STAGE CLASSIFICATION   BY AJCC 8TH EDITION:     ADDITIONAL FINDINGS:       pT2:  Tumor invades the muscularis propria.   pN0:  No regional lymph node metastasis.     Separate tubulovillous adenoma with high-grade dysplasia.         Review of Systems   Constitutional:  Negative for appetite change, chills, fever and unexpected weight change.   HENT:  Negative for hearing loss, rhinorrhea, sore throat and voice change.    Eyes:  Negative for photophobia and visual disturbance.   Respiratory:  Negative for cough, choking and shortness of breath.    Cardiovascular:  Negative for chest pain, palpitations and leg swelling.   Gastrointestinal:  Negative for abdominal pain, blood in stool, constipation, diarrhea, nausea and vomiting.   Endocrine: Negative for cold intolerance, heat intolerance, polydipsia and polyuria.   Musculoskeletal:  Negative for arthralgias, back pain, joint swelling and neck stiffness.   Skin:  Negative for color change, pallor and rash.   Neurological:  Negative for dizziness, seizures, syncope and headaches.   Hematological:  Negative for adenopathy. Does not bruise/bleed easily.   Psychiatric/Behavioral:  Negative for agitation, behavioral problems and confusion.      Objective:      Physical Exam  Constitutional:       General: She is not in acute distress.  Abdominal:      General: There is no distension.      Palpations: Abdomen is soft.      Tenderness: There is no abdominal tenderness. There is no guarding or rebound.      Hernia: No hernia is present.       Skin:     Comments: Incisions are clean, dry and intact  There is no evidence of infection, hematoma or seroma    Neurological:      Mental Status: She is alert and oriented to person, place, and time.   Psychiatric:         Behavior: Behavior is cooperative.       Assessment/Plan:   Malignant neoplasm of  ascending colon      Status post right colectomy.  Pathology returned T2 N0.  Doing very well.  Return to clinic in 6 months.  She has GI follow-up for endoscopy in the next year.  She has oncology follow-up in 2 weeks.    Blood pressure high again today.  This has been a chronic issue.  She has primary care and Cardiology follow-up.     P/w leukocytosis 13.8 and HR in 90s. CT chest no signs of infection, UA neg, afebrile, nontender abdomen. S/p vanc/zosyn and 2L NS in ED.   -No current signs of infection   -ucx and bcx (10/5) ngtd    #Thrush  -Started on diflucan 100mg daily

## 2022-12-06 ENCOUNTER — TELEPHONE (OUTPATIENT)
Dept: HEMATOLOGY/ONCOLOGY | Facility: CLINIC | Age: 72
End: 2022-12-06
Payer: MEDICARE

## 2022-12-06 ENCOUNTER — TELEPHONE (OUTPATIENT)
Dept: HEMATOLOGY/ONCOLOGY | Facility: CLINIC | Age: 72
End: 2022-12-06

## 2022-12-06 NOTE — TELEPHONE ENCOUNTER
Spoke to Mrs Boyle regarding transfer of care to [place] she stated she will need to speak with daughter before choosing a new provider she was given the names of the providers at our location  to discuss with her daughter and she was given my direct contact information to call back she verbalized understanding to all

## 2022-12-06 NOTE — TELEPHONE ENCOUNTER
"----- Message from Brooke Paul sent at 12/6/2022  8:51 AM CST -----  Regarding: Reschedule Existing Appointment  Appt Date:12/20/22    Type of appt : NP    Physician: Dr. Ortega    Reason for rescheduling? Would like to find someone closer in the [place] area    Caller: Self    Contact Preference:[phone]                   Additional Information:  "Thank you for all that you do for our patients'     "

## 2022-12-16 ENCOUNTER — TELEPHONE (OUTPATIENT)
Dept: HEMATOLOGY/ONCOLOGY | Facility: CLINIC | Age: 72
End: 2022-12-16
Payer: MEDICARE

## 2022-12-16 NOTE — TELEPHONE ENCOUNTER
I spoke with the patient to confirm her Survivorship appt and she said that she doesn't really need the appt. She said she does fine keeping up with things and her 2 daughters are nurses. She said she is moving all appts to [place].

## 2023-02-01 ENCOUNTER — TELEPHONE (OUTPATIENT)
Dept: HEMATOLOGY/ONCOLOGY | Facility: CLINIC | Age: 73
End: 2023-02-01
Payer: MEDICARE

## 2023-02-01 NOTE — TELEPHONE ENCOUNTER
Spoke with the patient to r/s her cancelled survivorship from December and she said she was not interested.

## 2023-03-13 NOTE — PROGRESS NOTES
Subjective:       Patient ID:  Pebbles Boyle is a 68 y.o. female who presents for   Chief Complaint   Patient presents with    Skin Check     UBSE    Spot     scalp     Initial visit    No h/o skin cancer, h/o lesion biopsied on scalp 2 years ago, unknown path, Renee QUINONES  H/o lesions treated with cryo  Requests UBSE for cancer screening, new complaint today          Spot  - Initial  Affected locations: scalp  Duration: months.  Signs / symptoms: growing  Severity: mild  Timing: constant  Aggravated by: nothing  Relieving factors/Treatments tried: nothing        Review of Systems   Constitutional: Negative for fever, chills, weight loss, weight gain, fatigue, night sweats and malaise.   Skin: Positive for daily sunscreen use. Negative for activity-related sunscreen use and wears hat.   Hematologic/Lymphatic: Bruises/bleeds easily.        Objective:    Physical Exam   Constitutional: She appears well-developed and well-nourished. No distress.   Neurological: She is alert and oriented to person, place, and time. She is not disoriented.   Psychiatric: She has a normal mood and affect.   Skin:   Areas Examined (abnormalities noted in diagram):   Scalp / Hair Palpated and Inspected  Head / Face Inspection Performed  Neck Inspection Performed  Chest / Axilla Inspection Performed  Abdomen Inspection Performed  Back Inspection Performed  RUE Inspected  LUE Inspection Performed  Nails and Digits Inspection Performed                   Diagram Legend     Erythematous scaling macule/papule c/w actinic keratosis       Vascular papule c/w angioma      Pigmented verrucoid papule/plaque c/w seborrheic keratosis      Yellow umbilicated papule c/w sebaceous hyperplasia      Irregularly shaped tan macule c/w lentigo     1-2 mm smooth white papules consistent with Milia      Movable subcutaneous cyst with punctum c/w epidermal inclusion cyst      Subcutaneous movable cyst c/w pilar cyst      Firm pink to brown papule c/w  dermatofibroma      Pedunculated fleshy papule(s) c/w skin tag(s)      Evenly pigmented macule c/w junctional nevus     Mildly variegated pigmented, slightly irregular-bordered macule c/w mildly atypical nevus      Flesh colored to evenly pigmented papule c/w intradermal nevus       Pink pearly papule/plaque c/w basal cell carcinoma      Erythematous hyperkeratotic cursted plaque c/w SCC      Surgical scar with no sign of skin cancer recurrence      Open and closed comedones      Inflammatory papules and pustules      Verrucoid papule consistent consistent with wart     Erythematous eczematous patches and plaques     Dystrophic onycholytic nail with subungual debris c/w onychomycosis     Umbilicated papule    Erythematous-base heme-crusted tan verrucoid plaque consistent with inflamed seborrheic keratosis     Erythematous Silvery Scaling Plaque c/w Psoriasis     See annotation          Assessment / Plan:      Pathology Orders:     Normal Orders This Visit    Tissue Specimen To Pathology, Dermatology     Questions:    Directional Terms:  Other(comment)    Clinical Information:  cutaneous horn, r/o SCC    Specific Site:  Ant scalp vertex,        Neoplasm of uncertain behavior  -     Tissue Specimen To Pathology, Dermatology  Shave biopsy procedure note:    Shave biopsy performed after verbal consent including risk of infection, scar, recurrence, need for additional treatment of site. Area prepped with alcohol, anesthetized with approximately 1.0cc of 1% lidocaine with epinephrine. Lesional tissue shaved with razor blade. Hemostasis achieved with application of aluminum chloride followed by hyfrecation. No complications. Dressing applied. Wound care explained.    Actinic keratoses  Cryosurgery Procedure Note    Verbal consent from the patient is obtained and the patient is aware of the precancerous quality and need for treatment of these lesions. Liquid nitrogen cryosurgery is applied to the 2 actinic keratoses, as  detailed in the physical exam, to produce a freeze injury. The patient is aware that blisters may form and is instructed on wound care with gentle cleansing and use of vaseline ointment to keep moist until healed. The patient is supplied a handout on cryosurgery and is instructed to call if lesions do not completely resolve.    Seborrheic keratoses  These are benign inherited growths without a malignant potential. Reassurance given to patient. No treatment is necessary.     Solar lentigo  This is a benign hyperpigmented sun induced lesion. Daily sun protection will reduce the number of new lesions. Treatment of these benign lesions are considered cosmetic.    Sebaceous hyperplasia of face  This is a common condition representing benign enlargement of the sebaceous lobule. It typically occurs in adulthood. Reassurance given to patient.     Patient instructed in importance in daily sun protection of at least spf 30. Mineral sunscreen ingredients preferred (Zinc +/- Titanium).   Recommend Elta MD for daily use on face and neck.  Patient encouraged to wear hat for all outdoor exposure.   Also discussed sun avoidance and use of protective clothing.         Follow-up in about 6 months (around 8/4/2019).   ADDENDUM:  MD Roseann Fabian MD Helene,   Attached is the note from my visit with Ms. Boyle. As you may remember, I had seen her previously regarding treatment of a biopsy-proven squamous cell carcinoma on the scalp vertex.  She was scheduled for Mohs' surgery to the site in April, but when she came for her surgery I was unable to confirm with certainty the site of the previous biopsy, and treatment was deferred.  However, there were multiple lesions on her scalp consistent with actinic keratoses, and I had planned to have her use Efudex solution to the area.  She was unable to afford the medication at that point.   She return for follow-up last week.  There remains no evidence of  the previously-biopsied squamous cell carcinoma. She still has multiple actinic keratoses on her scalp.  In addition, her insurance has now changed, and she thinks she will be able to afford the Efudex.   I discussed with her and her daughter the use of the medication and anticipated effects.  I also discussed with them timing of treatment, and possible delay of starting treatment until the weather cools down.  I discussed with her the option of following up with you to pursue treatment, so as to save her the need for driving to [place].   At this point, my plan would be to have her schedule a follow-up with you in December to coordinate having her start use of Efudex solution to her scalp.  I recommended that, if she needs another prescription at that point, either you or I could send it to her pharmacy.  I told her that I would anticipate a total duration of treatment of 4-6 weeks.   Good you have year staff contact her to schedule an appointment for her with you in December?   Please let me know if you have any questions.   Many thanks,   Nnamdi         Dilution (U/0.1 Cc): 5

## 2023-03-30 NOTE — PROGRESS NOTES
[facility] Hematolgy/Oncology  History & Physical    Subjective:      Patient ID:   NAME: Pebbles Boyle : 1950     72 y.o. female    Referring Doc: Janeth Quezada NP  Other Physicians: Ame; Sheldon; Fabiola Portillo        Chief Complaint: colon cancer      HPI:  72 y.o. female with diagnosis of stage I colon cancer who has been referred by Janeth Quezada NP for continuation of medical hematologic/oncologic care and management. Patient was diagnosed in Aug 2022 and underwent a right hemicolectomy with Dr Mccloud. She was subsequently under the care of Dr Pfeiffer with [facility] and last saw him in 2022.  She is here by herself. She has DM and HTN.     She is eating ok and bowels moving ok. Breathing ok, no CP, SOB, HA's or N/V. No BRBPR.    She is retired contractor.     She is a chronic active smoker and only occasional alcohol    Dad had lung cancer; mom had CVA; she is the last of 12 siblings; sister with breast ca and two brothers with colon ca      Discussed covid precautions but she has never been vaccinated            ROS:   GEN: normal without any fever, night sweats or weight loss; arthritis  HEENT: normal with no HA's, sore throat, stiff neck, changes in vision  CV: normal with no CP, SOB, PND, COULTER or orthopnea  PULM: normal with no SOB, cough, hemoptysis, sputum or pleuritic pain  GI: normal with no abdominal pain, nausea, vomiting, constipation, diarrhea, melanotic stools, BRBPR, or hematemesis  : normal with no hematuria, dysuria  BREAST: normal with no mass, discharge, pain  SKIN: normal with no rash, erythema, bruising, or swelling       Past Medical/Surgical History:  Past Medical History:   Diagnosis Date    Arthritis     Diabetes mellitus     DVT complicating pregnancy     ?    Endometrial cancer     Full dentures     Hypertension     Macular degeneration     Osteoporosis     PVC (premature ventricular contraction)     Squamous cell carcinoma of skin       Past Surgical History:   Procedure Laterality Date    Bilateral Tubal ligation      CARDIAC CATHETERIZATION  2014    Dr. womack   -    negative    EYE SURGERY  1987    RK    GALLBLADDER SURGERY      HAND ASSISTED LAPAROSCOPY N/A 8/29/2022    Procedure: LAPAROSCOPY, HAND- ASSISTED;  Surgeon: Pranav Mccloud III, MD;  Location: [facility] OR;  Service: General;  Laterality: N/A;    LAPAROSCOPIC RIGHT COLON RESECTION  8/29/2022    Procedure: COLECTOMY, RIGHT, LAPAROSCOPIC;  Surgeon: Pranav Mccloud III, MD;  Location: [facility] OR;  Service: General;;    LITHOTRIPSY      PARTIAL NEPHRECTOMY Right 2016    TONSILLECTOMY           Allergies:  Review of patient's allergies indicates:   Allergen Reactions    Tramadol Nausea And Vomiting    Codeine Other (See Comments)     Chest pains    Hydrocodone Other (See Comments)     hypotension       Social/Family History:  Social History     Socioeconomic History    Marital status:    Tobacco Use    Smoking status: Every Day     Packs/day: 0.25     Years: 59.00     Pack years: 14.75     Types: Cigarettes    Smokeless tobacco: Never   Substance and Sexual Activity    Alcohol use: Not Currently     Comment: rarely    Drug use: No     Family History   Problem Relation Age of Onset    Breast cancer Sister     Melanoma Neg Hx     Psoriasis Neg Hx     Lupus Neg Hx     Eczema Neg Hx          Medications:    Current Outpatient Medications:     ANTIOX #8/OM3/DHA/EPA/LUT/ZEAX (PRESERVISION AREDS 2, OMEGA-3, ORAL), Take 1 capsule by mouth 2 (two) times a day., Disp: , Rfl:     ASHWAGANDHA ROOT EXTRACT ORAL, Take by mouth., Disp: , Rfl:     carvediloL (COREG) 25 MG tablet, Take 25 mg by mouth 2 (two) times daily., Disp: , Rfl:     NIFEdipine (PROCARDIA-XL) 30 MG (OSM) 24 hr tablet, Take 30 mg by mouth., Disp: , Rfl:     spironolactone (ALDACTONE) 25 MG tablet, Take 25 mg by mouth., Disp: , Rfl:     valsartan (DIOVAN) 320 MG tablet, Take 320 mg by mouth., Disp: , Rfl:     vitamin D  (VITAMIN D3) 1000 units Tab, Take 1,000 Units by mouth once daily., Disp: , Rfl:     vitamins  A,C,E-zinc-copper [id number] unit-mg-unit Cap, Take 1 capsule by mouth as needed., Disp: , Rfl:     albuterol (PROVENTIL/VENTOLIN HFA) 90 mcg/actuation inhaler, inhale 2 puff by inhalation route  every 4 - 6 hours as needed as needed, Disp: , Rfl:     clonazePAM (KLONOPIN) 0.5 MG tablet, Take 0.5 mg by mouth 2 (two) times daily as needed for Anxiety., Disp: , Rfl:     cyanocobalamin (VITAMIN B-12) 1000 MCG tablet, Take 1,000 mcg by mouth once daily., Disp: , Rfl:     FEROSUL 325 mg (65 mg iron) Tab tablet, Take by mouth every other day., Disp: , Rfl:     furosemide (LASIX) 20 MG tablet, Take 1 tablet (20 mg total) by mouth once daily. (Patient not taking: Reported on 3/31/2023), Disp: 90 tablet, Rfl: 3    labetaloL (NORMODYNE) 200 MG tablet, Take 1 tablet (200 mg total) by mouth 3 (three) times daily. (Patient not taking: Reported on 3/31/2023), Disp: 90 tablet, Rfl: 11    losartan (COZAAR) 100 MG tablet, Take 1 tablet (100 mg total) by mouth once daily. (Patient not taking: Reported on 3/31/2023), Disp: 90 tablet, Rfl: 3    nicotine (NICODERM CQ) 7 mg/24 hr, Place 1 patch onto the skin once daily., Disp: 28 patch, Rfl: 0    pantoprazole (PROTONIX) 40 MG tablet, Take 40 mg by mouth once daily., Disp: , Rfl:     terazosin (HYTRIN) 1 MG capsule, Take 2 mg by mouth every evening., Disp: , Rfl:       Pathology:   Cancer Staging   Malignant neoplasm of ascending colon  Staging form: Colon and Rectum, AJCC 8th Edition  - Pathologic stage from 9/16/2022: Stage I (pT2, pN0, cM0) - Signed by Marcio Pfeiffer MD on 9/16/2022    Right Hemicolectomy 8/29/2023:  SEGMENTAL RIGHT COLON AND TERMINAL ILEUM RESECTION:   - INVASIVE MODERATELY DIFFERENTIATED COLON ADENOCARCINOMA.   - PROXIMAL, DISTAL AND CIRCUMFERENTIAL SURGICAL MARGINS FREE OF TUMOR    INVOLVEMENT.   - FIFTEEN REGIONAL LYMPH NODES NEGATIVE FOR MALIGNANCY.   - SEPARATE  "TUBULOVILLOUS ADENOMA WITH HIGH-GRADE DYSPLASIA.   - SURGICAL MARGINS NEGATIVE FOR DYSPLASIA.   - VERMIFORM APPENDIX WITH NO HISTOPATHOLOGIC ABNORMALITY    Right hemicolectomy.   TUMOR SITE:   Ascending colon.   TUMOR SIZE:   6.8 cm.    HISTOLOGIC TYPE:   Adenocarcinoma.   HISTOLOGIC GRADE:   G2 moderately differentiated.   TUMOR EXTENSION:   Invades into muscularis propria.   SURGICAL MARGINS:   All margins negative for invasive carcinoma.     NUMBER OF LYMPH NODES EXAMINED:   All regional lymph nodes negative for tumor.   Fifteen    pT2:  Tumor invades the muscularis propria.   pN0:  No regional lymph node metastasis.     Separate tubulovillous adenoma with high-grade dysplasia.         Immunohistochemistry (IHC) testing for mismatch repair (MMR) proteins:       MLH1: Loss of nuclear expression   MSH2: Intact nuclear expression   MSH6: Intact nuclear expression   PMS2: Loss of nuclear expression     BRAF Mutation   Detected        Mutation(s)   c.[id number] A (p.V600E)      MLH1 Methylation   Detected   MLH1 Methylation (%)   81.0     the presence  of a BRAF V600E mutation and / or MLH1 methylation suggests that the  tumor is sporadic and germline evaluation is probably not indicated    Objective:   Vitals:  Blood pressure (!) 162/71, pulse 60, temperature 97.3 °F (36.3 °C), resp. rate 16, height 5' 4" (1.626 m), weight 59.2 kg (130 lb 9.6 oz).    Physical Examination:   GEN: no apparent distress, comfortable; AAOx3  HEAD: atraumatic and normocephalic  EYES: no pallor, no icterus, PERRLA  ENT: OMM, no pharyngeal erythema, external ears WNL; no nasal discharge; no thrush  NECK: no masses, thyroid normal, trachea midline, no LAD/LN's, supple  CV: RRR with no murmur; normal pulse; normal S1 and S2; no pedal edema  CHEST: Normal respiratory effort; CTAB; normal breath sounds; no wheeze or crackles  ABDOM: nontender and nondistended; soft; normal bowel sounds; no rebound/guarding  MUSC/Skeletal: ROM normal; no crepitus; " joints normal; no deformities or arthropathy  EXTREM: no clubbing, cyanosis, inflammation or swelling  SKIN: no rashes, lesions, ulcers, petechiae or subcutaneous nodules  : no otoole  NEURO: grossly intact; motor/sensory WNL; AAOx3; no tremors  PSYCH: normal mood, affect and behavior  LYMPH: normal cervical, supraclavicular, axillary and groin LN's      Labs:   Lab Results   Component Value Date    WBC 6.21 09/09/2022    HGB 9.8 (L) 09/09/2022    HCT 29.6 (L) 09/09/2022    MCV 86 09/09/2022     09/09/2022    CMP  Sodium   Date Value Ref Range Status   09/09/2022 134 (L) 136 - 145 mmol/L Final   09/09/2022 135 (L) 136 - 145 mmol/L Final     Potassium   Date Value Ref Range Status   09/09/2022 4.2 3.5 - 5.1 mmol/L Final   09/09/2022 4.2 3.5 - 5.1 mmol/L Final     Chloride   Date Value Ref Range Status   09/09/2022 99 95 - 110 mmol/L Final   09/09/2022 100 95 - 110 mmol/L Final     CO2   Date Value Ref Range Status   09/09/2022 26 23 - 29 mmol/L Final   09/09/2022 26 23 - 29 mmol/L Final     Glucose   Date Value Ref Range Status   09/09/2022 149 (H) 70 - 110 mg/dL Final   09/09/2022 146 (H) 70 - 110 mg/dL Final     BUN   Date Value Ref Range Status   09/09/2022 15 8 - 23 mg/dL Final   09/09/2022 16 8 - 23 mg/dL Final     Creatinine   Date Value Ref Range Status   09/09/2022 1.0 0.5 - 1.4 mg/dL Final   09/09/2022 1.0 0.5 - 1.4 mg/dL Final     Calcium   Date Value Ref Range Status   09/09/2022 8.8 8.7 - 10.5 mg/dL Final   09/09/2022 8.7 8.7 - 10.5 mg/dL Final     Total Protein   Date Value Ref Range Status   09/09/2022 7.5 6.0 - 8.4 g/dL Final     Albumin   Date Value Ref Range Status   09/09/2022 3.8 3.5 - 5.2 g/dL Final   09/09/2022 3.7 3.5 - 5.2 g/dL Final     Total Bilirubin   Date Value Ref Range Status   09/09/2022 1.5 (H) 0.1 - 1.0 mg/dL Final     Comment:     For infants and newborns, interpretation of results should be based  on gestational age, weight and in agreement with  clinical  observations.    Premature Infant recommended reference ranges:  Up to 24 hours.............<8.0 mg/dL  Up to 48 hours............<12.0 mg/dL  3-5 days..................<15.0 mg/dL  6-29 days.................<15.0 mg/dL       Alkaline Phosphatase   Date Value Ref Range Status   09/09/2022 108 55 - 135 U/L Final     AST   Date Value Ref Range Status   09/09/2022 18 10 - 40 U/L Final     ALT   Date Value Ref Range Status   09/09/2022 16 10 - 44 U/L Final     Anion Gap   Date Value Ref Range Status   09/09/2022 9 8 - 16 mmol/L Final   09/09/2022 9 8 - 16 mmol/L Final     eGFR if    Date Value Ref Range Status   07/12/2022 >60.0 >60 mL/min/1.73 m^2 Final     eGFR if non    Date Value Ref Range Status   07/12/2022 >60.0 >60 mL/min/1.73 m^2 Final     Comment:     Calculation used to obtain the estimated glomerular filtration  rate (eGFR) is the CKD-EPI equation.            Radiology/Diagnostic Studies:          All lab results and imaging results have been reviewed and discussed with the patient    Assessment:   (1) 72 y.o. female  with diagnosis of stage I colon cancer who has been referred by Dr Quezada for continuation of medical hematologic/oncologic care and management. Patient was diagnosed in Aug 2022 and underwent a right hemicolectomy. She was subsequently under the care of Dr Pfeiffer with [facility]. .     3/31/2023:  - hx/of fungating mass of the cecum found on routine colonoscopy  - s/p right hemicolectomy with Dr Mccloud on 8/29/2022  - G2 moderately differentiated 6.2cm adenocarcinoma invading into the muscularis propria; with 15 LN's negative; no lymphovascular invasion identified  - pT2 pN0  - Stage 1  - BRAF c.1799Tgt A (p.V600E) positive which is suggestive of a sporadic process and not necessarily germline or Almanzar syndrome mediated  - Loss of nuclear expression of MLH1 and PMS2  - discussed with her about informing family members to make sure they are  getting early scopes and regular surveillance as precaution  - reviewed latest NCCN guidelines (vs1.2023) on chart    (2) HTN    (3) DM type II    (4) PVC's    (5) Hx/of Kidney stones s/p lithotripsy and Hx/of partial nephrectomy for benign tumor  - followed by Dr Arnold    (6) Anemia with iron deficiency    (7) Hx/of endometrial cancer    (8) Macular degeneration    (9) Hx/of SCCA of skin - followed by Dr Portillo    VISIT DIAGNOSES:              Malignant neoplasm of ascending colon    S/P right colectomy            Plan:     PLAN:  Recommend continued surveillance with CT's alternated with PET every 6 months for two years then yearly for 5 years  Check labs including CEA every 6 months  F/u with GI for enhanced colonoscopy surveillance - Dr Alford  F/u with PCP, GI,  and derm as directed    RTC in  6 months   Fax note to Ame Quezada [place], [place], [place]    COVID-19 Discussion:    I had long discussion with patient and any applicable family about the COVID-19 coronavirus epidemic and the recommended precautions with regard to cancer and/or hematology patients. I have re-iterated the CDC recommendations for adequate hand washing, use of hand -like products, and coughing into elbow, etc. In addition, especially for our patients who are on chemotherapy and/or our otherwise immunocompromised patients, I have recommended avoidance of crowds, including movie theaters, restaurants, churches, etc. I have recommended avoidance of any sick or symptomatic family members and/or friends. I have also recommended avoidance of any raw and unwashed food products, and general avoidance of food items that have not been prepared by themselves. The patient has been asked to call us immediately with any symptom developments, issues, questions or other general concerns.       Pathology Discussion:    I reviewed and discussed the pathology report(s) and radiograph reports (if available) in as simple to  "understand and/or laymen's terms to the best of my ability. I had an indepth conversation with the patient and went over the patient's individual diagnosis based on the information that was currently available. I discussed the TNM staging process with regard to the patient's particular cancer type, and the calculated stage based on the currently available TNM data and literature. I discussed the available prognostic data with regard to the current staging information and how it relates to the prognosis of their particular neoplastic process.        NCCN Guidelines:    I discussed the available treatment option(s) in accordance with the latest literature from the NCCN Clinical Practice Guidelines for the patient's particular type of cancer disorder. The NCCN Guidelines provide a "document evidence-based (and) consensus-driven management" of the care of oncology patients. The treatment recommendations were made not only in accordance to the NCCN guidelines, but also factored in to account the patient's overall age, condition, performance status and their medical co-morbidities. I went over the risks and benefits of the the treatment options (if any could be made) with regard to their particular cancer type, their cancer stage, their age, and their co-morbidities.       I have explained and the patient understands all of  the current recommendation(s). I have answered all of their questions to the best of my ability and to their complete satisfaction.             Thank you for allowing me to participate in this patient's care. Please call with any questions or concerns.    Electronically signed Kamlesh Hubbard MD    "

## 2023-03-31 ENCOUNTER — OFFICE VISIT (OUTPATIENT)
Dept: HEMATOLOGY/ONCOLOGY | Facility: CLINIC | Age: 73
End: 2023-03-31
Payer: MEDICARE

## 2023-03-31 VITALS
WEIGHT: 130.63 LBS | HEIGHT: 64 IN | DIASTOLIC BLOOD PRESSURE: 71 MMHG | TEMPERATURE: 97 F | RESPIRATION RATE: 16 BRPM | HEART RATE: 60 BPM | SYSTOLIC BLOOD PRESSURE: 162 MMHG | BODY MASS INDEX: 22.3 KG/M2

## 2023-03-31 DIAGNOSIS — Z90.49 S/P RIGHT COLECTOMY: ICD-10-CM

## 2023-03-31 DIAGNOSIS — C18.2 MALIGNANT NEOPLASM OF ASCENDING COLON: Primary | ICD-10-CM

## 2023-03-31 PROCEDURE — 99204 PR OFFICE/OUTPT VISIT, NEW, LEVL IV, 45-59 MIN: ICD-10-PCS | Mod: S$GLB,,, | Performed by: INTERNAL MEDICINE

## 2023-03-31 PROCEDURE — 99204 OFFICE O/P NEW MOD 45 MIN: CPT | Mod: S$GLB,,, | Performed by: INTERNAL MEDICINE

## 2023-03-31 RX ORDER — SPIRONOLACTONE 25 MG/1
25 TABLET ORAL DAILY
COMMUNITY
Start: 2023-03-15

## 2023-03-31 RX ORDER — VALSARTAN 320 MG/1
320 TABLET ORAL DAILY
COMMUNITY
Start: 2023-01-20

## 2023-03-31 RX ORDER — NIFEDIPINE 30 MG/1
30 TABLET, EXTENDED RELEASE ORAL
COMMUNITY
Start: 2022-10-12

## 2023-03-31 RX ORDER — CARVEDILOL 25 MG/1
25 TABLET ORAL 2 TIMES DAILY
COMMUNITY
Start: 2022-10-12

## 2023-04-25 ENCOUNTER — HOSPITAL ENCOUNTER (OUTPATIENT)
Dept: RADIOLOGY | Facility: HOSPITAL | Age: 73
Discharge: HOME | End: 2023-04-25
Attending: INTERNAL MEDICINE
Payer: MEDICARE

## 2023-04-25 VITALS — HEIGHT: 64 IN | BODY MASS INDEX: 21.85 KG/M2 | WEIGHT: 128 LBS

## 2023-04-25 DIAGNOSIS — Z90.49 S/P RIGHT COLECTOMY: ICD-10-CM

## 2023-04-25 DIAGNOSIS — C18.2 MALIGNANT NEOPLASM OF ASCENDING COLON: ICD-10-CM

## 2023-04-25 LAB — GLUCOSE SERPL-MCNC: 207 MG/DL (ref 70–110)

## 2023-04-25 PROCEDURE — A9552 F18 FDG: HCPCS | Mod: PO

## 2023-04-25 PROCEDURE — 78815 PET IMAGE W/CT SKULL-THIGH: CPT | Mod: TC,PO

## 2023-08-22 ENCOUNTER — TELEPHONE (OUTPATIENT)
Dept: HEMATOLOGY/ONCOLOGY | Facility: CLINIC | Age: 73
End: 2023-08-22
Payer: MEDICARE

## 2023-08-22 NOTE — TELEPHONE ENCOUNTER
Returned pt phone call and provided requested information of genetic testing done 8/22 [facility].  Pt had no further questions or concerns.    ----- Message from Ysabel Ramos sent at 8/22/2023 12:31 PM CDT -----  Contact: Pt  Type:  Needs Medical Advice    Who Called: Pt  Would the patient rather a call back or a response via [facility]? call  Best Call Back Number: [phone]  Additional Information: Dr. Pfeiffer ordered a genetic marker test for pt in 2022. Pt would like to know the name of the test. Please call pt back to advise.

## 2023-09-27 NOTE — PROGRESS NOTES
[facility] Hematology/Oncology  PROGRESS NOTE - 2nd Follow-up Visit      Subjective:       Patient ID:   NAME: Pebbles Boyle : 1950     73 y.o. female    Referring Doc: Janeth Quezada NP  Other Physicians: Ame; Sheldon; Fabiola Portillo        Chief Complaint: colon cancer f/u      History of Present Illness:     Patient returns today for a 2nd regularly scheduled follow-up visit.  The patient is here today to go over the results of the recently ordered labs, tests and studies. She is here by herself.     She feeling ok except for chronic neck issues. She is eating ok and bowels moving ok. Breathing ok, no CP, SOB, HA's or N/V. No BRBPR.    Pain management is referring her to Neurosurgery for her chronic neck issues.     She is a chronic active smoker and only occasional alcohol    Discussed covid precautions but she has never been vaccinated      ROS:   GEN: normal without any fever, night sweats or weight loss; arthritis especially the neck  HEENT: normal with no HA's, sore throat, stiff neck, changes in vision  CV: normal with no CP, SOB, PND, COULTER or orthopnea  PULM: normal with no SOB, cough, hemoptysis, sputum or pleuritic pain  GI: normal with no abdominal pain, nausea, vomiting, constipation, diarrhea, melanotic stools, BRBPR, or hematemesis  : normal with no hematuria, dysuria  BREAST: normal with no mass, discharge, pain  SKIN: normal with no rash, erythema, bruising, or swelling    Pain Scale: 6-7 on avg    Allergies:  Review of patient's allergies indicates:   Allergen Reactions    Tramadol Nausea And Vomiting    Codeine Other (See Comments)     Chest pains    Hydrocodone Other (See Comments)     hypotension       Medications:    Current Outpatient Medications:     albuterol (PROVENTIL/VENTOLIN HFA) 90 mcg/actuation inhaler, inhale 2 puff by inhalation route  every 4 - 6 hours as needed as needed, Disp: , Rfl:     ANTIOX #8/OM3/DHA/EPA/LUT/ZEAX (PRESERVISION AREDS 2, OMEGA-3,  ORAL), Take 1 capsule by mouth 2 (two) times a day., Disp: , Rfl:     carvediloL (COREG) 25 MG tablet, Take 25 mg by mouth 2 (two) times daily., Disp: , Rfl:     clonazePAM (KLONOPIN) 0.5 MG tablet, Take 0.5 mg by mouth 2 (two) times daily as needed for Anxiety., Disp: , Rfl:     cloNIDine 0.2 mg/24 hr td ptwk (CATAPRES) 0.2 mg/24 hr, Place 1 patch onto the skin every 7 days., Disp: , Rfl:     NIFEdipine (PROCARDIA-XL) 30 MG (OSM) 24 hr tablet, Take 30 mg by mouth., Disp: , Rfl:     pantoprazole (PROTONIX) 40 MG tablet, Take 40 mg by mouth once daily., Disp: , Rfl:     spironolactone (ALDACTONE) 25 MG tablet, Take 25 mg by mouth., Disp: , Rfl:     terazosin (HYTRIN) 1 MG capsule, Take 2 mg by mouth every evening., Disp: , Rfl:     valsartan (DIOVAN) 320 MG tablet, Take 320 mg by mouth., Disp: , Rfl:     ASHWAGANDHA ROOT EXTRACT ORAL, Take by mouth., Disp: , Rfl:     cyanocobalamin (VITAMIN B-12) 1000 MCG tablet, Take 1,000 mcg by mouth once daily., Disp: , Rfl:     FEROSUL 325 mg (65 mg iron) Tab tablet, Take by mouth every other day., Disp: , Rfl:     furosemide (LASIX) 20 MG tablet, Take 1 tablet (20 mg total) by mouth once daily. (Patient not taking: Reported on 3/31/2023), Disp: 90 tablet, Rfl: 3    labetaloL (NORMODYNE) 200 MG tablet, Take 1 tablet (200 mg total) by mouth 3 (three) times daily. (Patient not taking: Reported on 3/31/2023), Disp: 90 tablet, Rfl: 11    losartan (COZAAR) 100 MG tablet, Take 1 tablet (100 mg total) by mouth once daily. (Patient not taking: Reported on 3/31/2023), Disp: 90 tablet, Rfl: 3    nicotine (NICODERM CQ) 7 mg/24 hr, Place 1 patch onto the skin once daily. (Patient not taking: Reported on 9/28/2023), Disp: 28 patch, Rfl: 0    vitamin D (VITAMIN D3) 1000 units Tab, Take 1,000 Units by mouth once daily., Disp: , Rfl:     vitamins  A,C,E-zinc-copper [phone] unit-mg-unit Cap, Take 1 capsule by mouth as needed., Disp: , Rfl:     PMHx/PSHx Updates:  See patient's last visit  "with me on 3/31/2023.  See H&P on 3/31/2023        Pathology:   Cancer Staging   Malignant neoplasm of ascending colon  Staging form: Colon and Rectum, AJCC 8th Edition  - Pathologic stage from 9/16/2022: Stage I (pT2, pN0, cM0) - Signed by Marcio Pfeiffer MD on 9/16/2022      Right Hemicolectomy 8/29/2023:  SEGMENTAL RIGHT COLON AND TERMINAL ILEUM RESECTION:   - INVASIVE MODERATELY DIFFERENTIATED COLON ADENOCARCINOMA.   - PROXIMAL, DISTAL AND CIRCUMFERENTIAL SURGICAL MARGINS FREE OF TUMOR    INVOLVEMENT.   - FIFTEEN REGIONAL LYMPH NODES NEGATIVE FOR MALIGNANCY.   - SEPARATE TUBULOVILLOUS ADENOMA WITH HIGH-GRADE DYSPLASIA.   - SURGICAL MARGINS NEGATIVE FOR DYSPLASIA.   - VERMIFORM APPENDIX WITH NO HISTOPATHOLOGIC ABNORMALITY     Right hemicolectomy.   TUMOR SITE:   Ascending colon.   TUMOR SIZE:   6.8 cm.     HISTOLOGIC TYPE:   Adenocarcinoma.   HISTOLOGIC GRADE:   G2 moderately differentiated.   TUMOR EXTENSION:   Invades into muscularis propria.   SURGICAL MARGINS:   All margins negative for invasive carcinoma.      NUMBER OF LYMPH NODES EXAMINED:   All regional lymph nodes negative for tumor.   Fifteen     pT2:  Tumor invades the muscularis propria.   pN0:  No regional lymph node metastasis.     Separate tubulovillous adenoma with high-grade dysplasia.           Immunohistochemistry (IHC) testing for mismatch repair (MMR) proteins:       MLH1: Loss of nuclear expression   MSH2: Intact nuclear expression   MSH6: Intact nuclear expression   PMS2: Loss of nuclear expression      BRAF Mutation   Detected        Mutation(s)   c.[id number] A (p.V600E)        MLH1 Methylation   Detected   MLH1 Methylation (%)   81.0      the presence  of a BRAF V600E mutation and / or MLH1 methylation suggests that the  tumor is sporadic and germline evaluation is probably not indicated         Objective:     Vitals:  Blood pressure (!) 157/56, pulse 66, temperature 98 °F (36.7 °C), resp. rate 16, height 5' 3" (1.6 m), weight 57.6 kg " (127 lb).    Physical Examination:   GEN: no apparent distress, comfortable; AAOx3  HEAD: atraumatic and normocephalic  EYES: no pallor, no icterus, PERRLA  ENT: OMM, no pharyngeal erythema, external ears WNL; no nasal discharge; no thrush  NECK: no masses, thyroid normal, trachea midline, no LAD/LN's, supple  CV: RRR with no murmur; normal pulse; normal S1 and S2; no pedal edema  CHEST: Normal respiratory effort; CTAB; normal breath sounds; no wheeze or crackles  ABDOM: nontender and nondistended; soft; normal bowel sounds; no rebound/guarding  MUSC/Skeletal: ROM normal; no crepitus; joints normal; no deformities or arthropathy  EXTREM: no clubbing, cyanosis, inflammation or swelling  SKIN: no rashes, lesions, ulcers, petechiae or subcutaneous nodules  : no otoole  NEURO: grossly intact; motor/sensory WNL; AAOx3; no tremors  PSYCH: normal mood, affect and behavior  LYMPH: normal cervical, supraclavicular, axillary and groin LN's            Labs:     Lab Results   Component Value Date    WBC 6.02 04/25/2023    HGB 12.4 04/25/2023    HCT 36.8 (L) 04/25/2023    MCV 89 04/25/2023     04/25/2023       CMP  Sodium   Date Value Ref Range Status   04/25/2023 133 (L) 136 - 145 mmol/L Final     Potassium   Date Value Ref Range Status   04/25/2023 4.3 3.5 - 5.1 mmol/L Final     Chloride   Date Value Ref Range Status   04/25/2023 100 95 - 110 mmol/L Final     CO2   Date Value Ref Range Status   04/25/2023 26 23 - 29 mmol/L Final     Glucose   Date Value Ref Range Status   04/25/2023 144 (H) 70 - 110 mg/dL Final     BUN   Date Value Ref Range Status   04/25/2023 19 8 - 23 mg/dL Final     Creatinine   Date Value Ref Range Status   04/25/2023 0.8 0.5 - 1.4 mg/dL Final     Calcium   Date Value Ref Range Status   04/25/2023 9.0 8.7 - 10.5 mg/dL Final     Total Protein   Date Value Ref Range Status   04/25/2023 8.0 6.0 - 8.4 g/dL Final     Albumin   Date Value Ref Range Status   04/25/2023 4.1 3.5 - 5.2 g/dL Final     Total  Bilirubin   Date Value Ref Range Status   04/25/2023 1.2 (H) 0.1 - 1.0 mg/dL Final     Comment:     For infants and newborns, interpretation of results should be based  on gestational age, weight and in agreement with clinical  observations.    Premature Infant recommended reference ranges:  Up to 24 hours.............<8.0 mg/dL  Up to 48 hours............<12.0 mg/dL  3-5 days..................<15.0 mg/dL  6-29 days.................<15.0 mg/dL       Alkaline Phosphatase   Date Value Ref Range Status   04/25/2023 116 55 - 135 U/L Final     AST   Date Value Ref Range Status   04/25/2023 21 10 - 40 U/L Final     ALT   Date Value Ref Range Status   04/25/2023 26 10 - 44 U/L Final     Anion Gap   Date Value Ref Range Status   04/25/2023 7 (L) 8 - 16 mmol/L Final     eGFR   Date Value Ref Range Status   04/25/2023 >60.0 >60 mL/min/1.73 m^2 Final     Lab Results   Component Value Date    IRON 78 04/25/2023    TRANSFERRIN 283 04/25/2023    TIBC 396 04/25/2023    FESATURATED 20 04/25/2023      CEA 0.0 - 5.0 ng/mL 4.8          Radiology/Diagnostic Studies:    PET 4/25/2023:    Additional findings:  -Heterogeneous appearing thyroid with no discrete mass identified.  -Calcified plaques throughout the thoracoabdominal aorta and iliacs with no aneurysm identified.  -Mild cardiomegaly with scattered coronary artery calcifications.  -Prior cholecystectomy with multiple clips in the gallbladder fossa.  -Small focus of cortical scarring in the interpolar aspect of the right kidney.  -Mild splenomegaly, the spleen measures 14.5 x 9.8 x 6.3 cm (CC X AP X TR)  -Hepatomegaly, the spleen measures 19.5 cm sagittal right lobe.  -Slightly nodular hepatic parenchymal contour, possibly related to age or early/mild morphologic features of cirrhosis, consider correlation with liver function tests and hepatitis serologies.  -Moderate volume of stool and gas in the colon with a nonobstructive bowel gas pattern.    IMPRESSION:  No PET/CT evidence  of FDG avid malignancy.        I have reviewed all available lab results and radiology reports.    Assessment/Plan:   (1) 73 y.o. female with diagnosis of stage I colon cancer who has been referred by Dr Quezada for continuation of medical hematologic/oncologic care and management. Patient was diagnosed in Aug 2022 and underwent a right hemicolectomy. She was subsequently under the care of Dr Pfeiffer with [facility]. .      3/31/2023:  - hx/of fungating mass of the cecum found on routine colonoscopy  - s/p right hemicolectomy with Dr Mccloud on 8/29/2022  - G2 moderately differentiated 6.2cm adenocarcinoma invading into the muscularis propria; with 15 LN's negative; no lymphovascular invasion identified  - pT2 pN0  - Stage 1  - BRAF c.1799Tgt A (p.V600E) positive which is suggestive of a sporadic process and not necessarily germline or Almanzar syndrome mediated  - Loss of nuclear expression of MLH1 and PMS2  - discussed with her about informing family members to make sure they are getting early scopes and regular surveillance as precaution  - reviewed latest NCCN guidelines (vs1.2023) on chart    9/28/2023:  - she saw Dr Alford with GI the other day  - PET in March 2023 with no evidence of recurrent or metastatic cancer but she does have some enlargement and nodularity of the liver and some mild splenomegaly  - CEA in April 2023 was 4.8     (2) HTN     (3) DM type II     (4) PVC's     (5) Hx/of Kidney stones s/p lithotripsy and Hx/of partial nephrectomy for benign tumor  - followed by Dr Arnold     (6) Anemia with iron deficiency     (7) Hx/of endometrial cancer     (8) Macular degeneration     (9) Hx/of SCCA of skin - followed by Dr Portillo      VISIT DIAGNOSES:      Malignant neoplasm of ascending colon  -     CT Chest Abdomen Pelvis W W/O Contrast (XPD); Future; Expected date: 09/28/2023  -     CBC Auto Differential; Standing  -     Comprehensive Metabolic Panel; Standing  -     Iron and TIBC;  Standing  -     Ferritin; Standing  -     CEA; Standing  -     AFP Tumor Marker; Future; Expected date: 09/28/2023    Iron deficiency  -     CBC Auto Differential; Standing  -     Comprehensive Metabolic Panel; Standing  -     Iron and TIBC; Standing  -     Ferritin; Standing  -     CEA; Standing  -     AFP Tumor Marker; Future; Expected date: 09/28/2023    S/P right colectomy  -     CBC Auto Differential; Standing  -     Comprehensive Metabolic Panel; Standing  -     Iron and TIBC; Standing  -     Ferritin; Standing  -     CEA; Standing  -     AFP Tumor Marker; Future; Expected date: 09/28/2023    Other cirrhosis of liver  -     AFP Tumor Marker; Future; Expected date: 09/28/2023          PLAN:  Recommend continued surveillance with CT's alternated with PET every 6 months for two years then yearly for 5 years - due for 6 month CT scans  Check labs including CEA every 6 months; check AFP and hep viral panel  F/u with GI for enhanced colonoscopy surveillance and to assess her liver issues - Dr Alford  F/u with PCP, GI,  and derm as directed     RTC in  6 months   Fax note to Ame Quezada Ganji, Chamberlain, Erickson Lafleur, Abigail M., NP,  Prashanth    Discussion:     COVID-19 Discussion:     I had long discussion with patient and any applicable family about the COVID-19 coronavirus epidemic and the recommended precautions with regard to cancer and/or hematology patients. I have re-iterated the CDC recommendations for adequate hand washing, use of hand -like products, and coughing into elbow, etc. In addition, especially for our patients who are on chemotherapy and/or our otherwise immunocompromised patients, I have recommended avoidance of crowds, including movie theaters, restaurants, churches, etc. I have recommended avoidance of any sick or symptomatic family members and/or friends. I have also recommended avoidance of any raw and unwashed food products, and general avoidance of food items that  "have not been prepared by themselves. The patient has been asked to call us immediately with any symptom developments, issues, questions or other general concerns.         Pathology Discussion:     I reviewed and discussed the pathology report(s) and radiograph reports (if available) in as simple to understand and/or laymen's terms to the best of my ability. I had an indepth conversation with the patient and went over the patient's individual diagnosis based on the information that was currently available. I discussed the TNM staging process with regard to the patient's particular cancer type, and the calculated stage based on the currently available TNM data and literature. I discussed the available prognostic data with regard to the current staging information and how it relates to the prognosis of their particular neoplastic process.          NCCN Guidelines:     I discussed the available treatment option(s) in accordance with the latest literature from the NCCN Clinical Practice Guidelines for the patient's particular type of cancer disorder. The NCCN Guidelines provide a "document evidence-based (and) consensus-driven management" of the care of oncology patients. The treatment recommendations were made not only in accordance to the NCCN guidelines, but also factored in to account the patient's overall age, condition, performance status and their medical co-morbidities. I went over the risks and benefits of the the treatment options (if any could be made) with regard to their particular cancer type, their cancer stage, their age, and their co-morbidities.         I have explained and the patient understands all of  the current recommendation(s). I have answered all of their questions to the best of my ability and to their complete satisfaction.               I spent over 25 mins of time with the patient. Reviewed results of the recently ordered labs, tests and studies; made directives with regards to the " results. Over half of this time was spent couseling and coordinating care.    I have explained all of the above in detail and the patient understands all of the current recommendation(s). I have answered all of their questions to the best of my ability and to their complete satisfaction.   The patient is to continue with the current management plan.            Electronically signed by Kamlesh Hubbard MD          Answers submitted by the patient for this visit:  Review of Systems Questionnaire (Submitted on 9/25/2023)  appetite change : No  unexpected weight change: No  mouth sores: No  visual disturbance: No  cough: Yes  shortness of breath: No  chest pain: No  abdominal pain: No  diarrhea: No  frequency: No  back pain: Yes  rash: No  headaches: Yes  adenopathy: No  nervous/ anxious: Yes

## 2023-09-28 ENCOUNTER — OFFICE VISIT (OUTPATIENT)
Dept: HEMATOLOGY/ONCOLOGY | Facility: CLINIC | Age: 73
End: 2023-09-28
Payer: MEDICARE

## 2023-09-28 ENCOUNTER — LAB VISIT (OUTPATIENT)
Dept: LAB | Facility: HOSPITAL | Age: 73
End: 2023-09-28
Attending: INTERNAL MEDICINE
Payer: MEDICARE

## 2023-09-28 VITALS
WEIGHT: 127 LBS | DIASTOLIC BLOOD PRESSURE: 56 MMHG | BODY MASS INDEX: 22.5 KG/M2 | TEMPERATURE: 98 F | HEART RATE: 66 BPM | SYSTOLIC BLOOD PRESSURE: 157 MMHG | HEIGHT: 63 IN | RESPIRATION RATE: 16 BRPM

## 2023-09-28 DIAGNOSIS — K74.69 OTHER CIRRHOSIS OF LIVER: ICD-10-CM

## 2023-09-28 DIAGNOSIS — Z90.49 S/P RIGHT COLECTOMY: ICD-10-CM

## 2023-09-28 DIAGNOSIS — C18.2 MALIGNANT NEOPLASM OF ASCENDING COLON: ICD-10-CM

## 2023-09-28 DIAGNOSIS — E61.1 IRON DEFICIENCY: ICD-10-CM

## 2023-09-28 DIAGNOSIS — C18.2 MALIGNANT NEOPLASM OF ASCENDING COLON: Primary | ICD-10-CM

## 2023-09-28 LAB
ALBUMIN SERPL BCP-MCNC: 4.1 G/DL (ref 3.5–5.2)
ALP SERPL-CCNC: 149 U/L (ref 55–135)
ALT SERPL W/O P-5'-P-CCNC: 35 U/L (ref 10–44)
ANION GAP SERPL CALC-SCNC: 5 MMOL/L (ref 8–16)
AST SERPL-CCNC: 22 U/L (ref 10–40)
BASOPHILS # BLD AUTO: 0.01 K/UL (ref 0–0.2)
BASOPHILS NFR BLD: 0.1 % (ref 0–1.9)
BILIRUB SERPL-MCNC: 1.3 MG/DL (ref 0.1–1)
BUN SERPL-MCNC: 23 MG/DL (ref 8–23)
CALCIUM SERPL-MCNC: 8.8 MG/DL (ref 8.7–10.5)
CEA SERPL-MCNC: 4 NG/ML (ref 0–5)
CHLORIDE SERPL-SCNC: 102 MMOL/L (ref 95–110)
CO2 SERPL-SCNC: 23 MMOL/L (ref 23–29)
CREAT SERPL-MCNC: 1.1 MG/DL (ref 0.5–1.4)
DIFFERENTIAL METHOD: ABNORMAL
EOSINOPHIL # BLD AUTO: 0 K/UL (ref 0–0.5)
EOSINOPHIL NFR BLD: 0 % (ref 0–8)
ERYTHROCYTE [DISTWIDTH] IN BLOOD BY AUTOMATED COUNT: 13.2 % (ref 11.5–14.5)
EST. GFR  (NO RACE VARIABLE): 52.7 ML/MIN/1.73 M^2
FERRITIN SERPL-MCNC: 70.3 NG/ML (ref 20–300)
GLUCOSE SERPL-MCNC: 137 MG/DL (ref 70–110)
HCT VFR BLD AUTO: 32.9 % (ref 37–48.5)
HGB BLD-MCNC: 11.6 G/DL (ref 12–16)
IMM GRANULOCYTES # BLD AUTO: 0.04 K/UL (ref 0–0.04)
IMM GRANULOCYTES NFR BLD AUTO: 0.4 % (ref 0–0.5)
IRON SERPL-MCNC: 104 UG/DL (ref 30–160)
LYMPHOCYTES # BLD AUTO: 1.6 K/UL (ref 1–4.8)
LYMPHOCYTES NFR BLD: 17.8 % (ref 18–48)
MCH RBC QN AUTO: 30 PG (ref 27–31)
MCHC RBC AUTO-ENTMCNC: 35.3 G/DL (ref 32–36)
MCV RBC AUTO: 85 FL (ref 82–98)
MONOCYTES # BLD AUTO: 0.5 K/UL (ref 0.3–1)
MONOCYTES NFR BLD: 5.9 % (ref 4–15)
NEUTROPHILS # BLD AUTO: 6.7 K/UL (ref 1.8–7.7)
NEUTROPHILS NFR BLD: 75.8 % (ref 38–73)
NRBC BLD-RTO: 0 /100 WBC
PLATELET # BLD AUTO: 198 K/UL (ref 150–450)
PMV BLD AUTO: 8.4 FL (ref 9.2–12.9)
POTASSIUM SERPL-SCNC: 4.7 MMOL/L (ref 3.5–5.1)
PROT SERPL-MCNC: 8.4 G/DL (ref 6–8.4)
RBC # BLD AUTO: 3.87 M/UL (ref 4–5.4)
SATURATED IRON: 28 % (ref 20–50)
SODIUM SERPL-SCNC: 130 MMOL/L (ref 136–145)
TOTAL IRON BINDING CAPACITY: 378 UG/DL (ref 250–450)
TRANSFERRIN SERPL-MCNC: 270 MG/DL (ref 200–375)
WBC # BLD AUTO: 8.91 K/UL (ref 3.9–12.7)

## 2023-09-28 PROCEDURE — 82105 ALPHA-FETOPROTEIN SERUM: CPT | Performed by: INTERNAL MEDICINE

## 2023-09-28 PROCEDURE — 80074 ACUTE HEPATITIS PANEL: CPT | Performed by: INTERNAL MEDICINE

## 2023-09-28 PROCEDURE — 36415 COLL VENOUS BLD VENIPUNCTURE: CPT | Performed by: INTERNAL MEDICINE

## 2023-09-28 PROCEDURE — 84466 ASSAY OF TRANSFERRIN: CPT | Performed by: INTERNAL MEDICINE

## 2023-09-28 PROCEDURE — 99215 PR OFFICE/OUTPT VISIT, EST, LEVL V, 40-54 MIN: ICD-10-PCS | Mod: S$GLB,,, | Performed by: INTERNAL MEDICINE

## 2023-09-28 PROCEDURE — 82378 CARCINOEMBRYONIC ANTIGEN: CPT | Performed by: INTERNAL MEDICINE

## 2023-09-28 PROCEDURE — 99215 OFFICE O/P EST HI 40 MIN: CPT | Mod: S$GLB,,, | Performed by: INTERNAL MEDICINE

## 2023-09-28 PROCEDURE — 85025 COMPLETE CBC W/AUTO DIFF WBC: CPT | Performed by: INTERNAL MEDICINE

## 2023-09-28 PROCEDURE — 83540 ASSAY OF IRON: CPT | Performed by: INTERNAL MEDICINE

## 2023-09-28 PROCEDURE — 80053 COMPREHEN METABOLIC PANEL: CPT | Performed by: INTERNAL MEDICINE

## 2023-09-28 PROCEDURE — 82728 ASSAY OF FERRITIN: CPT | Performed by: INTERNAL MEDICINE

## 2023-09-28 RX ORDER — CLONIDINE 0.2 MG/24H
1 PATCH, EXTENDED RELEASE TRANSDERMAL
COMMUNITY

## 2023-10-01 LAB — AFP-TM SERPL-MCNC: 1.8 NG/ML (ref 0–9.2)

## 2023-10-03 LAB
HAV IGM SERPL QL IA: NEGATIVE
HBV CORE IGM SERPL QL IA: NEGATIVE
HBV SURFACE AG SERPL QL IA: NEGATIVE
HCV AB S/CO SERPL IA: NON REACTIVE
HCV AB SERPL QL IA: NORMAL

## 2024-01-22 ENCOUNTER — HOSPITAL ENCOUNTER (OUTPATIENT)
Dept: RADIOLOGY | Facility: HOSPITAL | Age: 74
Discharge: HOME | End: 2024-01-22
Attending: NURSE PRACTITIONER
Payer: MEDICARE

## 2024-01-22 DIAGNOSIS — N95.1 MENOPAUSAL STATE: ICD-10-CM

## 2024-01-22 DIAGNOSIS — Z12.31 ENCOUNTER FOR SCREENING MAMMOGRAM FOR BREAST CANCER: ICD-10-CM

## 2024-01-22 PROCEDURE — 77067 SCR MAMMO BI INCL CAD: CPT | Mod: TC

## 2024-01-22 PROCEDURE — 77080 DXA BONE DENSITY AXIAL: CPT | Mod: TC

## 2024-01-22 PROCEDURE — 77080 DXA BONE DENSITY AXIAL: CPT | Mod: 26,,, | Performed by: RADIOLOGY

## 2024-01-22 PROCEDURE — 77067 SCR MAMMO BI INCL CAD: CPT | Mod: 26,,, | Performed by: RADIOLOGY

## 2024-01-22 PROCEDURE — 77063 BREAST TOMOSYNTHESIS BI: CPT | Mod: 26,,, | Performed by: RADIOLOGY

## 2024-03-04 DIAGNOSIS — I73.9 CLAUDICATION IN PERIPHERAL VASCULAR DISEASE: Primary | ICD-10-CM

## 2024-03-11 ENCOUNTER — HOSPITAL ENCOUNTER (OUTPATIENT)
Dept: RADIOLOGY | Facility: HOSPITAL | Age: 74
Discharge: HOME | End: 2024-03-11
Attending: SURGERY
Payer: MEDICARE

## 2024-03-11 DIAGNOSIS — I73.9 CLAUDICATION IN PERIPHERAL VASCULAR DISEASE: ICD-10-CM

## 2024-03-11 PROCEDURE — 75635 CT ANGIO ABDOMINAL ARTERIES: CPT | Mod: 26,,, | Performed by: RADIOLOGY

## 2024-03-11 PROCEDURE — 25500020 PHARM REV CODE 255

## 2024-03-11 PROCEDURE — 75635 CT ANGIO ABDOMINAL ARTERIES: CPT | Mod: TC

## 2024-03-11 RX ADMIN — IOHEXOL 100 ML: 350 INJECTION, SOLUTION INTRAVENOUS at 06:03

## 2024-03-22 ENCOUNTER — HOSPITAL ENCOUNTER (OUTPATIENT)
Dept: RADIOLOGY | Facility: HOSPITAL | Age: 74
Discharge: HOME | End: 2024-03-22
Attending: SURGERY
Payer: MEDICARE

## 2024-03-22 ENCOUNTER — HOSPITAL ENCOUNTER (OUTPATIENT)
Dept: PREADMISSION TESTING | Facility: HOSPITAL | Age: 74
Discharge: HOME | End: 2024-03-22
Attending: SURGERY
Payer: MEDICARE

## 2024-03-22 VITALS
SYSTOLIC BLOOD PRESSURE: 148 MMHG | HEIGHT: 64 IN | DIASTOLIC BLOOD PRESSURE: 71 MMHG | BODY MASS INDEX: 21.98 KG/M2 | WEIGHT: 128.75 LBS | HEART RATE: 64 BPM | RESPIRATION RATE: 18 BRPM | TEMPERATURE: 98 F | OXYGEN SATURATION: 97 %

## 2024-03-22 DIAGNOSIS — Z51.81 MONITORING FOR LONG-TERM ANTICOAGULANT USE: ICD-10-CM

## 2024-03-22 DIAGNOSIS — Z79.01 MONITORING FOR LONG-TERM ANTICOAGULANT USE: ICD-10-CM

## 2024-03-22 DIAGNOSIS — Z01.818 PREOP TESTING: ICD-10-CM

## 2024-03-22 DIAGNOSIS — Z01.818 PREOP TESTING: Primary | ICD-10-CM

## 2024-03-22 PROCEDURE — 93005 ELECTROCARDIOGRAM TRACING: CPT | Performed by: INTERNAL MEDICINE

## 2024-03-22 PROCEDURE — 93010 ELECTROCARDIOGRAM REPORT: CPT | Mod: ,,, | Performed by: INTERNAL MEDICINE

## 2024-03-22 PROCEDURE — 71046 X-RAY EXAM CHEST 2 VIEWS: CPT | Mod: TC

## 2024-03-22 RX ORDER — EZETIMIBE 10 MG/1
10 TABLET ORAL DAILY
COMMUNITY

## 2024-03-22 RX ORDER — NAPROXEN SODIUM 220 MG/1
81 TABLET, FILM COATED ORAL DAILY
COMMUNITY

## 2024-03-22 RX ORDER — SODIUM CHLORIDE 9 MG/ML
INJECTION, SOLUTION INTRAVENOUS CONTINUOUS
Status: CANCELLED | OUTPATIENT
Start: 2024-03-27

## 2024-03-22 NOTE — PRE-PROCEDURE INSTRUCTIONS
History and medicines reviewed. Instructed patient to bring all meds in original bottles morning of procedure. Verbalizes understanding of pre op instructions. Escorted to radiology for CXR.

## 2024-03-22 NOTE — DISCHARGE INSTRUCTIONS
Your procedure is scheduled for: Wednesday, March 27, 2024          [facility] at: 6:00 AM    Nothing to eat or drink after midnight the night before your procedure.  Do not take any medications the morning of your procedure  Bring all your medications with you in the original pill bottles from pharmacy.  If you take blood thinners, ask your doctor if you should stop taking them.  Do not take metformin 24 hours prior to your procedure.  Adjust your insulin or other diabetes medications if needed.   Do your chlorhexidine wash the night before and morning of your procedure.  If you use a CPAP or BiPAP at home, please bring it with you the day of your procedure.  Make arrangements for someone you know to drive you home after your procedure. Taxi and Uber are not acceptable.         Any questions call the [facility] at [phone]

## 2024-03-27 ENCOUNTER — HOSPITAL ENCOUNTER (OUTPATIENT)
Facility: HOSPITAL | Age: 74
Discharge: HOME | End: 2024-03-27
Attending: SURGERY | Admitting: SURGERY
Payer: MEDICARE

## 2024-03-27 VITALS
HEART RATE: 70 BPM | RESPIRATION RATE: 16 BRPM | OXYGEN SATURATION: 99 % | HEIGHT: 64 IN | SYSTOLIC BLOOD PRESSURE: 159 MMHG | DIASTOLIC BLOOD PRESSURE: 72 MMHG | BODY MASS INDEX: 21.98 KG/M2 | WEIGHT: 128.75 LBS

## 2024-03-27 DIAGNOSIS — Q25.1 STENOSIS OF ABDOMINAL AORTA: Primary | ICD-10-CM

## 2024-03-27 DIAGNOSIS — Z01.818 PREOP TESTING: ICD-10-CM

## 2024-03-27 LAB
ANION GAP SERPL CALC-SCNC: 7 MMOL/L (ref 8–16)
BUN SERPL-MCNC: 18 MG/DL (ref 8–23)
CALCIUM SERPL-MCNC: 8.6 MG/DL (ref 8.7–10.5)
CHLORIDE SERPL-SCNC: 103 MMOL/L (ref 95–110)
CO2 SERPL-SCNC: 23 MMOL/L (ref 23–29)
CREAT SERPL-MCNC: 1 MG/DL (ref 0.5–1.4)
EST. GFR  (NO RACE VARIABLE): 59.1 ML/MIN/1.73 M^2
GLUCOSE SERPL-MCNC: 152 MG/DL (ref 70–110)
POC ACTIVATED CLOTTING TIME K: 190 SEC (ref 74–137)
POTASSIUM SERPL-SCNC: 4.3 MMOL/L (ref 3.5–5.1)
SAMPLE: ABNORMAL
SODIUM SERPL-SCNC: 133 MMOL/L (ref 136–145)

## 2024-03-27 PROCEDURE — C1887 CATHETER, GUIDING: HCPCS | Performed by: SURGERY

## 2024-03-27 PROCEDURE — 25000003 PHARM REV CODE 250: Performed by: SURGERY

## 2024-03-27 PROCEDURE — 99152 MOD SED SAME PHYS/QHP 5/>YRS: CPT | Performed by: SURGERY

## 2024-03-27 PROCEDURE — 75716 ARTERY X-RAYS ARMS/LEGS: CPT | Mod: 59 | Performed by: SURGERY

## 2024-03-27 PROCEDURE — C1725 CATH, TRANSLUMIN NON-LASER: HCPCS | Performed by: SURGERY

## 2024-03-27 PROCEDURE — C1884 EMBOLIZATION PROTECT SYST: HCPCS | Performed by: SURGERY

## 2024-03-27 PROCEDURE — 25500020 PHARM REV CODE 255: Performed by: SURGERY

## 2024-03-27 PROCEDURE — 99153 MOD SED SAME PHYS/QHP EA: CPT | Performed by: SURGERY

## 2024-03-27 PROCEDURE — C1769 GUIDE WIRE: HCPCS | Performed by: SURGERY

## 2024-03-27 PROCEDURE — 63600175 PHARM REV CODE 636 W HCPCS: Performed by: SURGERY

## 2024-03-27 PROCEDURE — C1874 STENT, COATED/COV W/DEL SYS: HCPCS | Performed by: SURGERY

## 2024-03-27 PROCEDURE — 75630 X-RAY AORTA LEG ARTERIES: CPT | Performed by: SURGERY

## 2024-03-27 PROCEDURE — C1894 INTRO/SHEATH, NON-LASER: HCPCS | Performed by: SURGERY

## 2024-03-27 PROCEDURE — 80048 BASIC METABOLIC PNL TOTAL CA: CPT | Performed by: SURGERY

## 2024-03-27 PROCEDURE — 37226 HC FEM/POPL REVASC W/STENT: CPT | Mod: LT | Performed by: SURGERY

## 2024-03-27 PROCEDURE — 37236 OPEN/PERQ PLACE STENT 1ST: CPT | Performed by: SURGERY

## 2024-03-27 DEVICE — VIABAHN BX BALLOON EXP ENDO 11MMX29MM 8FR135CMCATH HEPARIN
Type: IMPLANTABLE DEVICE | Site: GROIN | Status: FUNCTIONAL
Brand: GORE VIABAHN VBX BALLOON EXPANDABLE ENDO

## 2024-03-27 DEVICE — VIABAHN SX ENDO HEPARIN 18 RO 6MMX5CM 6FR 120CM CATH
Type: IMPLANTABLE DEVICE | Site: LEG | Status: FUNCTIONAL
Brand: GORE VIABAHN ENDOPROSTHESIS WITH HEPARIN

## 2024-03-27 RX ORDER — HEPARIN SODIUM 10000 [USP'U]/ML
INJECTION, SOLUTION INTRAVENOUS; SUBCUTANEOUS
Status: DISCONTINUED | OUTPATIENT
Start: 2024-03-27 | End: 2024-03-27 | Stop reason: HOSPADM

## 2024-03-27 RX ORDER — CLOPIDOGREL BISULFATE 75 MG/1
75 TABLET ORAL DAILY
Status: DISCONTINUED | OUTPATIENT
Start: 2024-03-27 | End: 2024-03-27 | Stop reason: HOSPADM

## 2024-03-27 RX ORDER — ONDANSETRON 4 MG/1
8 TABLET, ORALLY DISINTEGRATING ORAL EVERY 8 HOURS PRN
Status: DISCONTINUED | OUTPATIENT
Start: 2024-03-27 | End: 2024-03-27 | Stop reason: HOSPADM

## 2024-03-27 RX ORDER — SODIUM CHLORIDE 9 MG/ML
INJECTION, SOLUTION INTRAVENOUS CONTINUOUS
Status: DISCONTINUED | OUTPATIENT
Start: 2024-03-27 | End: 2024-03-27 | Stop reason: HOSPADM

## 2024-03-27 RX ORDER — LIDOCAINE HYDROCHLORIDE 10 MG/ML
INJECTION INFILTRATION; PERINEURAL
Status: DISCONTINUED | OUTPATIENT
Start: 2024-03-27 | End: 2024-03-27 | Stop reason: HOSPADM

## 2024-03-27 RX ORDER — PROTAMINE SULFATE 10 MG/ML
INJECTION, SOLUTION INTRAVENOUS
Status: DISCONTINUED | OUTPATIENT
Start: 2024-03-27 | End: 2024-03-27 | Stop reason: HOSPADM

## 2024-03-27 RX ORDER — MIDAZOLAM HYDROCHLORIDE 1 MG/ML
INJECTION INTRAMUSCULAR; INTRAVENOUS
Status: DISCONTINUED | OUTPATIENT
Start: 2024-03-27 | End: 2024-03-27 | Stop reason: HOSPADM

## 2024-03-27 RX ORDER — ACETAMINOPHEN 325 MG/1
650 TABLET ORAL EVERY 4 HOURS PRN
Status: DISCONTINUED | OUTPATIENT
Start: 2024-03-27 | End: 2024-03-27 | Stop reason: HOSPADM

## 2024-03-27 RX ORDER — FENTANYL CITRATE 50 UG/ML
INJECTION, SOLUTION INTRAMUSCULAR; INTRAVENOUS
Status: DISCONTINUED | OUTPATIENT
Start: 2024-03-27 | End: 2024-03-27 | Stop reason: HOSPADM

## 2024-03-27 RX ORDER — CLOPIDOGREL BISULFATE 75 MG/1
75 TABLET ORAL DAILY
Qty: 90 TABLET | Refills: 3 | Status: SHIPPED | OUTPATIENT
Start: 2024-03-27

## 2024-03-27 RX ORDER — CLOPIDOGREL BISULFATE 75 MG/1
TABLET ORAL
Status: DISCONTINUED
Start: 2024-03-27 | End: 2024-03-27 | Stop reason: HOSPADM

## 2024-03-27 RX ORDER — IODIXANOL 320 MG/ML
INJECTION, SOLUTION INTRAVASCULAR
Status: DISCONTINUED | OUTPATIENT
Start: 2024-03-27 | End: 2024-03-27 | Stop reason: HOSPADM

## 2024-03-27 RX ADMIN — CLOPIDOGREL BISULFATE 75 MG: 75 TABLET, FILM COATED ORAL at 10:03

## 2024-03-27 RX ADMIN — SODIUM CHLORIDE: 9 INJECTION, SOLUTION INTRAVENOUS at 06:03

## 2024-03-27 NOTE — PLAN OF CARE
Report received from JEZ Vidal. Arrived back from cath lab via stretcher. No complaints of pain or distress noted. Dressing to right groin CDI. Post angio instructions given. Resting in bed with call light in reach.

## 2024-03-27 NOTE — PLAN OF CARE
Ambulated onto unit without difficulty. No complaints of pain or distress noted. Undressed of personal clothing and gown on. Pre-op. Resting in bed with call light in reach. No family present.

## 2024-03-27 NOTE — DISCHARGE INSTRUCTIONS
Post angiogram discharge care  You have a puncture site in your right groin  You can remove your current dressing in 24 hours and take a shower. Showers only for the next week. Do not sit in a bathtub or pool of water 7 days until the puncture site is healed.   Gently clean the puncture site daily using soap and water while showering, dry thoroughly and cover with a Band-Aid. Make sure the Band-Aid and puncture site stay clean and dry.  Inspect the site daily for tenderness, discharge or signs of infection.   Observe the puncture site for signs of bleeding, obvious oozing, formation on knot under skin, or bruising. If any of these were to occur you should immediately lie down flat and apply firm pressure at the insertion site for 10 minutes. If bleeding or swelling does not stop, call 911! Do NOT try to drive yourself to the hospital.   Drink at least 6-8 glasses of clear liquids during the next 24 hours to flush out the dye.  You must not be alone for the next 24 hours.   You may experience soreness or tenderness that my last for 1 week.   You may have mild oozing from the puncture site and/or possible bruising that could last up to 2 weeks.  You may also have a small lump form that may last up to 6 weeks.       Activity  Do not drive or operate an automobile or hazardous machinery for 24 hours  Do note engage in sports for 1 week.  Limit lifting over 10 pounds for the nest week, or until puncture site heals.  Limit you activities for the next 48 hours. Avoid excessive bending or squatting.  You may resume normal activity in 2-3 days, let pain be your guide.    Call your Doctor immediately if you experience any of the following:  Chest pain, palpitations, shortness of breath, excessive dizziness, fainting, nausea or vomiting.  Any signs of infection: redness, warmth, swelling, pain, drainage (other than a little blood on Band-Aid), chills, poorly healing puncture site, fever greater than 101.0 F  Change in color,  coolness, swelling, numbness, or pain to the involved extremity  Significant bleeding from the puncture site.

## 2024-03-27 NOTE — PLAN OF CARE
Ambulated around unit and to restroom without difficulty. Returned to room with no complaints of pain or distress noted. VSS. Dressing to right groin remains CDI. Proceed with discharge as ordered.

## 2024-03-27 NOTE — PLAN OF CARE
Verbalizes understanding of discharge instructions, angio site care, and follow up care. Belongings gathered and dressed in personal clothing. Left via wheelchair to private auto accompanied by daughter.

## 2024-03-27 NOTE — Clinical Note
The stent was inserted into the middle left superficial femoral artery. Self expanding stent deployed. Deployment device removed.

## 2024-03-27 NOTE — PLAN OF CARE
Bedside hand off report given to cath lab staff .  Chart given. Left to cath lab via stretcher for scheduled procedure. No family present.   Deep Sutures: 4-0 Monocryl

## 2024-03-28 NOTE — OP NOTE
[facility]  Surgery Department  Operative Note    SUMMARY     Date of Procedure: 3/27/2024     Procedure: Procedure(s) (LRB):  ABDOMINAL AORTOGRAM (N/A)  Stent, Aorta  Stent, Femoral Artery     Surgeon(s) and Role:     * Jaquan Romero MD - Primary    Assisting Surgeon: None    Pre-Operative Diagnosis: Stenosis of abdominal aorta [Q25.1]    Post-Operative Diagnosis: Post-Op Diagnosis Codes:     * Stenosis of abdominal aorta [Q25.1]    Anesthesia: RN IV Sedation    Operative Findings (including complications, if any):  70% distal abdominal aortic focal stenosis.  Left distal SFA 80% stenosis.    Description of Technical Procedures:  Abdominal aortogram.  Bilateral lower extremity angiogram.  Distal aortic stent angioplasty with 11 x 29 VBX stent.  Left distal SFA stent angioplasty with 6 x 5 via Pettit post dilated to 5 mm.    Right femoral artery was accessed under ultrasound guidance and 8 Danish sheath was placed.  Sheath was advanced up to the abdominal aorta using the 25 cm sheath and abdominal aortogram was performed which showed 70% focal atherosclerotic plaque just below the IRMA and stopping above the bifurcation.  Bilateral common internal external iliac arteries are patent.  The right common femoral has a eccentric atherosclerotic plaque without severe stenosis.  The right profunda and SFA are patent with three-vessel runoff on the right.  The left common femoral is patent the profunda is patent left SFA is patent down to about Davion's canal we there is a very irregular dissected segment causing severe stenosis with an ulcerated plaque and then the distal SFA popliteal are patent with three-vessel runoff.  We then performed a stent angioplasty of the distal abdominal aortic stenosis with an 11 x 29 VBX dilating up to 12 mm repeat angiogram showed excellent post PTA and stent result with preservation of the IRMA and the aortic bifurcation.  We then went up and over the bifurcation with the 8  Cameroonian 45 cm sheath cross the distal left SFA lesion and performed stent grafting of that with a 6 x 5 via Pettit post dilated that to 5 mm.  Repeat angiogram showed evidence of embolization down the left side.  ACT was checked and then the sheath was removed and pressure was held.    Significant Surgical Tasks Conducted by the Assistant(s), if Applicable:     Estimated Blood Loss (EBL): * No values recorded between 3/27/2024  8:33 AM and 3/27/2024  9:49 AM *           Implants:   Implant Name Type Inv. Item Serial No.  Lot No. LRB No. Used Action   VBX 0.035 11MM X 29 MM   [id number]   N/A 1 Implanted   STENT VIABAHN [id number] - [id number] stent peripheral covered- self expanding STENT VIABAHN [id number]  [employer]  N/A 1 Implanted       Specimens:   Specimen (24h ago, onward)      None                    Condition: Good    Disposition: PACU - hemodynamically stable.    Attestation: Op Note Attestation: I was physically present and scrubbed for the entire procedure.

## 2024-04-11 LAB
OHS QRS DURATION: 84 MS
OHS QTC CALCULATION: 448 MS

## (undated) DEVICE — STAPLER SKIN NON ROTATE PXW35

## (undated) DEVICE — STAPLER TX TX60B

## (undated) DEVICE — BABCOCK WITH RATCHET HANDLE 5MM 5BB

## (undated) DEVICE — SCISSORS 5MM APPLIED MEDICAL   CB030

## (undated) DEVICE — CATH 5FR OMNIFLUSH 65CM .038

## (undated) DEVICE — PAD TRENDELENBURG POS. KIT

## (undated) DEVICE — TRAY GENERAL LAPAROSCOPY

## (undated) DEVICE — SUTURE SILK 3-0 SH 18 C013D

## (undated) DEVICE — SUTURE SILK 3-0 30 A304H

## (undated) DEVICE — RETRACTOR WOUND GELPORT C8XX2

## (undated) DEVICE — BLLN MUSTANG 5 X 40 X 135

## (undated) DEVICE — TROCAR OPTICAL ZTHREAD 5MMX100MM CTF03

## (undated) DEVICE — LIGASURE MARYLAND LF1937 REPLACES LF1737

## (undated) DEVICE — SET INSUFFLATION TUBING HIGH FLOW SMOKE EVACUATION PNEUMOCLE

## (undated) DEVICE — Device

## (undated) DEVICE — DRESSING POST OP MEPILEX AG 4X8

## (undated) DEVICE — NEEDLE INSUFFLATION 120MM 172015

## (undated) DEVICE — SLEEVE TROCAR 5MMX100MM  CTS02

## (undated) DEVICE — DEVICE SPIDER FX 7MMX320CM

## (undated) DEVICE — SUTURE PDS 1 TP-1 48 PDP880G

## (undated) DEVICE — PAD BOVIE ADULT

## (undated) DEVICE — WIRE ASAHI REGALIA XS 1.0

## (undated) DEVICE — SYRINGE HYPODERMC LL 22G 1.5 ECLIPSE 30

## (undated) DEVICE — GLOVE BIOGEL MICRO SURGEON PINK SZ 7.5

## (undated) DEVICE — SOLUTION IRRI NS BOTTLE 1000ML R5200-01

## (undated) DEVICE — TIP BOVIE 6.5 0014

## (undated) DEVICE — SUTURE MONOCRYL 4-0 PS-2 27 MCP426H

## (undated) DEVICE — TIP BOVIE TEFLON E1450X

## (undated) DEVICE — UNDERGLOVE BIOGEL PI MICRO BLUE SZ 8

## (undated) DEVICE — SUTURE VICRYL 3-0 SH 27 VCP416H

## (undated) DEVICE — SUTURE SILK 0 30 A306H

## (undated) DEVICE — SUTURE SILK 2-0 30 A305H

## (undated) DEVICE — ADHESIVE DERMABOND SKIN DNX12

## (undated) DEVICE — CABLE MONOPOLAR 10FT DISPOSABLE

## (undated) DEVICE — CUTTER LINEAR TLC55

## (undated) DEVICE — DRAPE WARMER ORS-100

## (undated) DEVICE — SHEATH PINNACLE 8FR

## (undated) DEVICE — JELLY LUBRICATING TUBE 4OZ 4OZLUB

## (undated) DEVICE — RELOAD LINEAR 6R45B

## (undated) DEVICE — SOLUTION IRRI H2O BOTTLE 1000ML

## (undated) DEVICE — SOLUTION NACL 0.9% 3000ML

## (undated) DEVICE — PACK SPY-PHI

## (undated) DEVICE — TROCAR OPTICAL ZTHREAD 12MMX100MM CTF73

## (undated) DEVICE — SUTURE SILK 2-0 SH 18 CR C012D

## (undated) DEVICE — CUTTER LINEAR TLC75

## (undated) DEVICE — RELOAD LINEAR TCR75